# Patient Record
Sex: MALE | Race: BLACK OR AFRICAN AMERICAN | Employment: OTHER | URBAN - METROPOLITAN AREA
[De-identification: names, ages, dates, MRNs, and addresses within clinical notes are randomized per-mention and may not be internally consistent; named-entity substitution may affect disease eponyms.]

---

## 2017-03-21 ENCOUNTER — CARE COORDINATION (OUTPATIENT)
Dept: GERIATRIC MEDICINE | Facility: CLINIC | Age: 72
End: 2017-03-21

## 2017-03-21 DIAGNOSIS — Z71.89 ACP (ADVANCE CARE PLANNING): Chronic | ICD-10-CM

## 2017-03-21 NOTE — PROGRESS NOTES
Home visit/Stanislaw Risk Assessment/EW screening completed on: 3/21/17  Member resides: Apartment handicap accessible. Continues to live alone in a one bedroom apartment.  Member remains independent with all ADLs and active in the community. Has supportive family.  No recent hospital stays or ED visits.   Member currently receiving the following services:  Homemaking, on ECS.   See EMR for a list of client's diagnoses and medications.   Medication management: Medications reviewed:Yes. Medication management: Independent-does not set up. Medication understanding:Patient has understanding of regimen and is adherent Yes. MTM offered.  Member Mood/behavior-PHQ9 score: 0. Denies any changes in mood. Remained in a pleasant mood during assessment.   Plan of Care: No services recommended or requested.  Follow-Up Plan: Member informed of future contact, plan to f/u with member with a 6 month telephone assessment.  Contact information shared with member and family, encouraged member to call with any questions or concerns prior to this.  See [facility] for further detailed information  Laura Romo RN BSN ANGEL  [facility]   [phone]  Fax: [phone]

## 2017-04-11 NOTE — ASSESSMENT & PLAN NOTE
Advance Care Planning 03/21/2017: ACP Review of Chart / Resources Provided:  Reviewed chart for advance care plan.  Chivo Echavarriaf Adonay has been provided information and resources to begin or update their advance care plan.  Added by Laura Romo

## 2017-04-12 ENCOUNTER — CARE COORDINATION (OUTPATIENT)
Dept: GERIATRIC MEDICINE | Facility: CLINIC | Age: 72
End: 2017-04-12

## 2017-04-12 NOTE — PROGRESS NOTES
Mailed copy of care plan to client.  As requested/needed:  emailed CPS to [place] for auths, updated services in access as needed and submitted appropriate referrals/auths, and entered MMIS. Chart was returned to CM.   ESC tool uploaded into [employer] and services entered into Access.    Binta Thakkar  Case Management Specialist  [facility]  [phone]

## 2017-04-24 NOTE — PROGRESS NOTES
Per CM, [facility]  Binta Thakkar  Case Management Specialist  [facility]  [phone]

## 2017-04-25 NOTE — PROGRESS NOTES
Per CM, [facility]  Binta Thakkar  Case Management Specialist  [facility]  [phone]

## 2017-05-31 NOTE — PROGRESS NOTES
Per CM, [facility]  Binta Thakkar  Case Management Specialist  [facility]  [phone]

## 2017-06-01 NOTE — PROGRESS NOTES
Per CM, [facility]  Binta Thakkar  Case Management Specialist  [facility]  [phone]

## 2017-06-08 ENCOUNTER — OFFICE VISIT (OUTPATIENT)
Dept: FAMILY MEDICINE | Facility: CLINIC | Age: 72
End: 2017-06-08
Payer: COMMERCIAL

## 2017-06-08 ENCOUNTER — RADIANT APPOINTMENT (OUTPATIENT)
Dept: GENERAL RADIOLOGY | Facility: CLINIC | Age: 72
End: 2017-06-08
Attending: FAMILY MEDICINE
Payer: COMMERCIAL

## 2017-06-08 ENCOUNTER — TELEPHONE (OUTPATIENT)
Dept: FAMILY MEDICINE | Facility: CLINIC | Age: 72
End: 2017-06-08

## 2017-06-08 VITALS
RESPIRATION RATE: 16 BRPM | DIASTOLIC BLOOD PRESSURE: 76 MMHG | SYSTOLIC BLOOD PRESSURE: 120 MMHG | HEIGHT: 74 IN | OXYGEN SATURATION: 94 % | HEART RATE: 77 BPM | BODY MASS INDEX: 24.13 KG/M2 | WEIGHT: 188 LBS | TEMPERATURE: 97.3 F

## 2017-06-08 DIAGNOSIS — E11.22 TYPE 2 DIABETES MELLITUS WITH STAGE 1 CHRONIC KIDNEY DISEASE, WITHOUT LONG-TERM CURRENT USE OF INSULIN (H): Chronic | ICD-10-CM

## 2017-06-08 DIAGNOSIS — M70.51 KNEE BURSITIS, RIGHT: ICD-10-CM

## 2017-06-08 DIAGNOSIS — Z01.818 PREOP GENERAL PHYSICAL EXAM: ICD-10-CM

## 2017-06-08 DIAGNOSIS — Z13.89 SCREENING FOR DIABETIC PERIPHERAL NEUROPATHY: ICD-10-CM

## 2017-06-08 DIAGNOSIS — H10.10 SEASONAL ALLERGIC CONJUNCTIVITIS: ICD-10-CM

## 2017-06-08 DIAGNOSIS — E11.9 CONTROLLED TYPE 2 DIABETES MELLITUS WITHOUT COMPLICATION, WITHOUT LONG-TERM CURRENT USE OF INSULIN (H): Chronic | ICD-10-CM

## 2017-06-08 DIAGNOSIS — M50.30 DDD (DEGENERATIVE DISC DISEASE), CERVICAL: ICD-10-CM

## 2017-06-08 DIAGNOSIS — J30.1 SEASONAL ALLERGIC RHINITIS DUE TO POLLEN: ICD-10-CM

## 2017-06-08 DIAGNOSIS — M79.672 FOOT PAIN, LEFT: ICD-10-CM

## 2017-06-08 DIAGNOSIS — Z12.11 COLON CANCER SCREENING: ICD-10-CM

## 2017-06-08 DIAGNOSIS — M17.11 PRIMARY OSTEOARTHRITIS OF RIGHT KNEE: Primary | ICD-10-CM

## 2017-06-08 DIAGNOSIS — R20.9 DISTURBANCE OF SKIN SENSATION: ICD-10-CM

## 2017-06-08 DIAGNOSIS — E08.42 DIABETIC POLYNEUROPATHY ASSOCIATED WITH DIABETES MELLITUS DUE TO UNDERLYING CONDITION (H): ICD-10-CM

## 2017-06-08 DIAGNOSIS — E78.00 PURE HYPERCHOLESTEROLEMIA: ICD-10-CM

## 2017-06-08 DIAGNOSIS — L28.0 NEURODERMATITIS: ICD-10-CM

## 2017-06-08 DIAGNOSIS — E11.9 TYPE 2 DIABETES MELLITUS WITHOUT COMPLICATION, WITHOUT LONG-TERM CURRENT USE OF INSULIN (H): ICD-10-CM

## 2017-06-08 DIAGNOSIS — R25.2 CRAMP OF BOTH LOWER EXTREMITIES: ICD-10-CM

## 2017-06-08 DIAGNOSIS — I10 HYPERTENSION GOAL BP (BLOOD PRESSURE) < 130/80: Chronic | ICD-10-CM

## 2017-06-08 DIAGNOSIS — K21.9 GASTROESOPHAGEAL REFLUX DISEASE WITHOUT ESOPHAGITIS: ICD-10-CM

## 2017-06-08 DIAGNOSIS — E78.5 HYPERLIPIDEMIA WITH TARGET LDL LESS THAN 100: Chronic | ICD-10-CM

## 2017-06-08 DIAGNOSIS — I10 ESSENTIAL HYPERTENSION WITH GOAL BLOOD PRESSURE LESS THAN 130/80: Chronic | ICD-10-CM

## 2017-06-08 DIAGNOSIS — N40.0 BENIGN NON-NODULAR PROSTATIC HYPERPLASIA WITHOUT LOWER URINARY TRACT SYMPTOMS: ICD-10-CM

## 2017-06-08 DIAGNOSIS — N18.1 TYPE 2 DIABETES MELLITUS WITH STAGE 1 CHRONIC KIDNEY DISEASE, WITHOUT LONG-TERM CURRENT USE OF INSULIN (H): Chronic | ICD-10-CM

## 2017-06-08 LAB
ALT SERPL W P-5'-P-CCNC: 23 U/L (ref 0–70)
ANION GAP SERPL CALCULATED.3IONS-SCNC: 12 MMOL/L (ref 3–14)
BASOPHILS # BLD AUTO: 0 10E9/L (ref 0–0.2)
BASOPHILS NFR BLD AUTO: 0.2 %
BUN SERPL-MCNC: 14 MG/DL (ref 7–30)
CALCIUM SERPL-MCNC: 8.9 MG/DL (ref 8.5–10.1)
CHLORIDE SERPL-SCNC: 106 MMOL/L (ref 94–109)
CO2 SERPL-SCNC: 21 MMOL/L (ref 20–32)
CREAT SERPL-MCNC: 0.88 MG/DL (ref 0.66–1.25)
CREAT UR-MCNC: 186 MG/DL
DIFFERENTIAL METHOD BLD: NORMAL
EOSINOPHIL # BLD AUTO: 0.3 10E9/L (ref 0–0.7)
EOSINOPHIL NFR BLD AUTO: 5.4 %
ERYTHROCYTE [DISTWIDTH] IN BLOOD BY AUTOMATED COUNT: 13 % (ref 10–15)
GFR SERPL CREATININE-BSD FRML MDRD: 85 ML/MIN/1.7M2
GLUCOSE SERPL-MCNC: 170 MG/DL (ref 70–99)
HBA1C MFR BLD: 7.6 % (ref 4.3–6)
HCT VFR BLD AUTO: 45 % (ref 40–53)
HGB BLD-MCNC: 15.6 G/DL (ref 13.3–17.7)
LYMPHOCYTES # BLD AUTO: 1.3 10E9/L (ref 0.8–5.3)
LYMPHOCYTES NFR BLD AUTO: 26.1 %
MCH RBC QN AUTO: 30.6 PG (ref 26.5–33)
MCHC RBC AUTO-ENTMCNC: 34.7 G/DL (ref 31.5–36.5)
MCV RBC AUTO: 88 FL (ref 78–100)
MICROALBUMIN UR-MCNC: 7 MG/L
MICROALBUMIN/CREAT UR: 3.73 MG/G CR (ref 0–17)
MONOCYTES # BLD AUTO: 0.5 10E9/L (ref 0–1.3)
MONOCYTES NFR BLD AUTO: 11.1 %
NEUTROPHILS # BLD AUTO: 2.7 10E9/L (ref 1.6–8.3)
NEUTROPHILS NFR BLD AUTO: 57.2 %
PLATELET # BLD AUTO: 193 10E9/L (ref 150–450)
POTASSIUM SERPL-SCNC: 4 MMOL/L (ref 3.4–5.3)
RBC # BLD AUTO: 5.09 10E12/L (ref 4.4–5.9)
SODIUM SERPL-SCNC: 139 MMOL/L (ref 133–144)
WBC # BLD AUTO: 4.8 10E9/L (ref 4–11)

## 2017-06-08 PROCEDURE — 82043 UR ALBUMIN QUANTITATIVE: CPT | Performed by: FAMILY MEDICINE

## 2017-06-08 PROCEDURE — 80048 BASIC METABOLIC PNL TOTAL CA: CPT | Performed by: FAMILY MEDICINE

## 2017-06-08 PROCEDURE — 93000 ELECTROCARDIOGRAM COMPLETE: CPT | Performed by: FAMILY MEDICINE

## 2017-06-08 PROCEDURE — 84460 ALANINE AMINO (ALT) (SGPT): CPT | Performed by: FAMILY MEDICINE

## 2017-06-08 PROCEDURE — 83036 HEMOGLOBIN GLYCOSYLATED A1C: CPT | Performed by: FAMILY MEDICINE

## 2017-06-08 PROCEDURE — 36415 COLL VENOUS BLD VENIPUNCTURE: CPT | Performed by: FAMILY MEDICINE

## 2017-06-08 PROCEDURE — 85025 COMPLETE CBC W/AUTO DIFF WBC: CPT | Performed by: FAMILY MEDICINE

## 2017-06-08 PROCEDURE — 71020 XR CHEST 2 VW: CPT

## 2017-06-08 RX ORDER — TRIAMCINOLONE ACETONIDE 1 MG/G
CREAM TOPICAL
Qty: 80 G | Refills: 2 | Status: SHIPPED | OUTPATIENT
Start: 2017-06-08 | End: 2017-09-29

## 2017-06-08 RX ORDER — ASPIRIN 81 MG/1
81 TABLET, CHEWABLE ORAL DAILY
Qty: 108 TABLET | Refills: 3 | Status: SHIPPED | OUTPATIENT
Start: 2017-06-08 | End: 2017-06-08

## 2017-06-08 RX ORDER — LISINOPRIL 2.5 MG/1
2.5 TABLET ORAL DAILY
Qty: 30 TABLET | Refills: 11 | Status: SHIPPED | OUTPATIENT
Start: 2017-06-08 | End: 2017-06-08

## 2017-06-08 RX ORDER — TAMSULOSIN HYDROCHLORIDE 0.4 MG/1
0.4 CAPSULE ORAL DAILY
Qty: 90 CAPSULE | Refills: 1 | Status: SHIPPED | OUTPATIENT
Start: 2017-06-08 | End: 2017-09-29

## 2017-06-08 RX ORDER — PREDNISONE 20 MG/1
20 TABLET ORAL 2 TIMES DAILY
Qty: 14 TABLET | Refills: 0 | Status: SHIPPED | OUTPATIENT
Start: 2017-06-08 | End: 2017-06-15

## 2017-06-08 RX ORDER — FLUTICASONE PROPIONATE 50 MCG
1-2 SPRAY, SUSPENSION (ML) NASAL DAILY
Qty: 16 G | Refills: 11 | Status: SHIPPED | OUTPATIENT
Start: 2017-06-08 | End: 2017-06-08

## 2017-06-08 RX ORDER — ASPIRIN 81 MG/1
81 TABLET, CHEWABLE ORAL DAILY
Qty: 108 TABLET | Refills: 3 | Status: SHIPPED | OUTPATIENT
Start: 2017-06-08 | End: 2018-07-23

## 2017-06-08 RX ORDER — LISINOPRIL 2.5 MG/1
2.5 TABLET ORAL DAILY
Qty: 30 TABLET | Refills: 11 | Status: SHIPPED | OUTPATIENT
Start: 2017-06-08 | End: 2018-07-23

## 2017-06-08 RX ORDER — GABAPENTIN 100 MG/1
100 CAPSULE ORAL
Qty: 30 CAPSULE | Refills: 11 | Status: SHIPPED | OUTPATIENT
Start: 2017-06-08 | End: 2017-09-29

## 2017-06-08 RX ORDER — LANOLIN ALCOHOL/MO/W.PET/CERES
1000 CREAM (GRAM) TOPICAL DAILY
Qty: 100 TABLET | Refills: 3 | Status: SHIPPED | OUTPATIENT
Start: 2017-06-08 | End: 2017-09-29

## 2017-06-08 RX ORDER — TAMSULOSIN HYDROCHLORIDE 0.4 MG/1
0.4 CAPSULE ORAL DAILY
Qty: 90 CAPSULE | Refills: 1 | Status: SHIPPED | OUTPATIENT
Start: 2017-06-08 | End: 2017-06-08

## 2017-06-08 RX ORDER — METFORMIN HCL 500 MG
TABLET, EXTENDED RELEASE 24 HR ORAL
Qty: 60 TABLET | Refills: 11 | Status: SHIPPED | OUTPATIENT
Start: 2017-06-08 | End: 2017-06-08

## 2017-06-08 RX ORDER — FINASTERIDE 5 MG/1
5 TABLET, FILM COATED ORAL DAILY
Qty: 30 TABLET | Refills: 1 | Status: SHIPPED | OUTPATIENT
Start: 2017-06-08 | End: 2017-09-29

## 2017-06-08 RX ORDER — LIDOCAINE 50 MG/G
OINTMENT TOPICAL
Qty: 142 G | Refills: 11 | Status: SHIPPED | OUTPATIENT
Start: 2017-06-08 | End: 2017-06-08

## 2017-06-08 RX ORDER — LIDOCAINE 50 MG/G
OINTMENT TOPICAL
Qty: 142 G | Refills: 11 | Status: SHIPPED | OUTPATIENT
Start: 2017-06-08 | End: 2017-09-29

## 2017-06-08 RX ORDER — ATORVASTATIN CALCIUM 10 MG/1
10 TABLET, FILM COATED ORAL DAILY
Qty: 30 TABLET | Refills: 11 | Status: SHIPPED | OUTPATIENT
Start: 2017-06-08 | End: 2017-06-08

## 2017-06-08 RX ORDER — ACETAMINOPHEN 500 MG
500-1000 TABLET ORAL EVERY 6 HOURS PRN
Qty: 100 TABLET | Refills: 11 | Status: SHIPPED | OUTPATIENT
Start: 2017-06-08 | End: 2017-12-19

## 2017-06-08 RX ORDER — LEVOCETIRIZINE DIHYDROCHLORIDE 5 MG/1
5 TABLET, FILM COATED ORAL EVERY EVENING
Qty: 30 TABLET | Refills: 11 | Status: SHIPPED | OUTPATIENT
Start: 2017-06-08 | End: 2018-07-23

## 2017-06-08 RX ORDER — FINASTERIDE 5 MG/1
5 TABLET, FILM COATED ORAL DAILY
Qty: 30 TABLET | Refills: 1 | Status: SHIPPED | OUTPATIENT
Start: 2017-06-08 | End: 2017-06-08

## 2017-06-08 RX ORDER — ACETAMINOPHEN 500 MG
500-1000 TABLET ORAL EVERY 6 HOURS PRN
Qty: 100 TABLET | Refills: 11 | Status: SHIPPED | OUTPATIENT
Start: 2017-06-08 | End: 2017-06-08

## 2017-06-08 RX ORDER — LANOLIN ALCOHOL/MO/W.PET/CERES
1000 CREAM (GRAM) TOPICAL DAILY
Qty: 100 TABLET | Refills: 3 | Status: SHIPPED | OUTPATIENT
Start: 2017-06-08 | End: 2017-06-08

## 2017-06-08 RX ORDER — TRIAMCINOLONE ACETONIDE 1 MG/G
CREAM TOPICAL
Qty: 80 G | Refills: 2 | Status: SHIPPED | OUTPATIENT
Start: 2017-06-08 | End: 2017-06-08

## 2017-06-08 RX ORDER — FLUTICASONE PROPIONATE 50 MCG
1-2 SPRAY, SUSPENSION (ML) NASAL DAILY
Qty: 16 G | Refills: 11 | Status: SHIPPED | OUTPATIENT
Start: 2017-06-08 | End: 2018-07-23

## 2017-06-08 RX ORDER — LEVOCETIRIZINE DIHYDROCHLORIDE 5 MG/1
5 TABLET, FILM COATED ORAL EVERY EVENING
Qty: 30 TABLET | Refills: 11 | Status: SHIPPED | OUTPATIENT
Start: 2017-06-08 | End: 2017-06-08

## 2017-06-08 RX ORDER — METFORMIN HCL 500 MG
TABLET, EXTENDED RELEASE 24 HR ORAL
Qty: 60 TABLET | Refills: 11 | Status: SHIPPED | OUTPATIENT
Start: 2017-06-08 | End: 2018-07-23

## 2017-06-08 RX ORDER — PREDNISONE 20 MG/1
20 TABLET ORAL 2 TIMES DAILY
Qty: 14 TABLET | Refills: 0 | Status: SHIPPED | OUTPATIENT
Start: 2017-06-08 | End: 2017-06-08

## 2017-06-08 RX ORDER — ATORVASTATIN CALCIUM 10 MG/1
10 TABLET, FILM COATED ORAL DAILY
Qty: 30 TABLET | Refills: 11 | Status: SHIPPED | OUTPATIENT
Start: 2017-06-08 | End: 2018-07-23

## 2017-06-08 NOTE — NURSING NOTE
"Chief Complaint   Patient presents with     Knee Pain       Initial /76  Pulse 77  Temp 97.3  F (36.3  C) (Tympanic)  Resp 16  Ht 6' 1.5\" (1.867 m)  Wt 188 lb (85.3 kg)  SpO2 94%  BMI 24.47 kg/m2 Estimated body mass index is 24.47 kg/(m^2) as calculated from the following:    Height as of this encounter: 6' 1.5\" (1.867 m).    Weight as of this encounter: 188 lb (85.3 kg).  Medication Reconciliation: complete   .Macario CAZARES      "

## 2017-06-08 NOTE — TELEPHONE ENCOUNTER
Patient is at pharmacy and pharmacy says they did not receive any prescriptions from us today, specifically the prednisone.     Called in prednisone verbally.     Erika Alcala RN  06/08/17  2:53 PM

## 2017-06-08 NOTE — LETTER
[facility]  [address]  [address]  [place] [address] [phone]                                                                                                           Chivo Vitor Adonay  [address]  [address]  [place] [address]    June 8, 2017      Dear Chivo,    The results of your recent tests were reviewed and are enclosed.     NORMAL CHEST XRAY   CLEARED FOR PREOP  Results for orders placed or performed in visit on 06/08/17   XR Chest 2 Views    Narrative    XR CHEST 2 VW 6/8/2017 8:42 AM    COMPARISON: 1/19/2016    HISTORY: Preoperative evaluation.      Impression    IMPRESSION: Cardiac silhouette and pulmonary vasculature are within  normal limits. No focal airspace disease, pleural effusion or  pneumothorax.    RUPA FERGUSON           Thank you for choosing [facility].  We appreciate the opportunity to serve you and look forward to supporting your healthcare needs in the future.    If you have any questions or concerns, please call me or my staff at [phone].      Sincerely,    Darrin Hernandez Jr MD

## 2017-06-08 NOTE — PROGRESS NOTES
Please send normal lab letter when labs are complete  NORMAL CHEST XRAY   CLEARED FOR PREOP   EDUARDA LAMBERT JR., MD

## 2017-06-08 NOTE — MR AVS SNAPSHOT
After Visit Summary   6/8/2017    Chivo Urias    MRN: [id number]           Patient Information     Date Of Birth          1945        Visit Information        Provider Department      6/8/2017 7:30 AM Darrin Hernandez MD; [employer] [facility]        Today's Diagnoses     Primary osteoarthritis of right knee    -  1    Controlled type 2 diabetes mellitus without complication, without long-term current use of insulin (H)        Type 2 diabetes mellitus with stage 1 chronic kidney disease, without long-term current use of insulin (H)        Hyperlipidemia with target LDL less than 100        Hypertension goal BP (blood pressure) < 130/80        Colon cancer screening        Preop general physical exam          Care Instructions    (M17.11) Primary osteoarthritis of right knee  (primary encounter diagnosis)  Comment:    Plan: Albumin Random Urine Quantitative, Hemoglobin         A1c, predniSONE (DELTASONE) 20 MG tablet             (E11.9) Controlled type 2 diabetes mellitus without complication, without long-term current use of insulin (H)  Comment:    Plan:      (E11.22,  N18.1) Type 2 diabetes mellitus with stage 1 chronic kidney disease, without long-term current use of insulin (H)  Comment:    Plan:      (E78.5) Hyperlipidemia with target LDL less than 100  Comment:    Plan: ALT             (I10) Hypertension goal BP (blood pressure) < 130/80  Comment:    Plan: Basic metabolic panel             (Z12.11) Colon cancer screening  Comment:    Plan: Fecal colorectal cancer screen (FIT)             (Z01.818) Preop general physical exam  Comment:    Plan: EKG 12-lead complete w/read - Clinics, XR Chest        2 Views, CBC with platelets differential                          Follow-ups after your visit        Follow-up notes from your care team     Return in about 2 weeks (around 6/22/2017).      Future tests that were ordered for you today  "    Open Future Orders        Priority Expected Expires Ordered    XR Chest 2 Views Routine 2017    Fecal colorectal cancer screen (FIT) Routine 2017            Who to contact     If you have questions or need follow up information about today's clinic visit or your schedule please contact [facility] directly at [phone].  Normal or non-critical lab and imaging results will be communicated to you by [employer], letter or phone within 4 business days after the clinic has received the results. If you do not hear from us within 7 days, please contact the clinic through [employer] or phone. If you have a critical or abnormal lab result, we will notify you by phone as soon as possible.  Submit refill requests through [employer] or call your pharmacy and they will forward the refill request to us. Please allow 3 business days for your refill to be completed.          Additional Information About Your Visit        Pic[employer]f Information     [employer] lets you send messages to your doctor, view your test results, renew your prescriptions, schedule appointments and more. To sign up, go to [email or web address] . Click on \"Log in\" on the left side of the screen, which will take you to the Welcome page. Then click on \"Sign up Now\" on the right side of the page.     You will be asked to enter the access code listed below, as well as some personal information. Please follow the directions to create your username and password.     Your access code is: [email or web address]  Expires: 2017  8:05 AM     Your access code will  in 90 days. If you need help or a new code, please call your [facility] clinic or [phone].        Care EveryWhere ID     This is your Care EveryWhere ID. This could be used by other organizations to access your [facility] medical records  [id number]        Your Vitals Were     Pulse Temperature Respirations Height Pulse Oximetry BMI " "(Body Mass Index)    77 97.3  F (36.3  C) (Tympanic) 16 6' 1.5\" (1.867 m) 94% 24.47 kg/m2       Blood Pressure from Last 3 Encounters:   06/08/17 120/76   12/08/16 124/64   11/10/16 112/64    Weight from Last 3 Encounters:   06/08/17 188 lb (85.3 kg)   12/08/16 196 lb (88.9 kg)   11/10/16 193 lb (87.5 kg)              We Performed the Following     Albumin Random Urine Quantitative     ALT     Basic metabolic panel     CBC with platelets differential     EKG 12-lead complete w/read - Clinics     Hemoglobin A1c          Today's Medication Changes          These changes are accurate as of: 6/8/17  8:05 AM.  If you have any questions, ask your nurse or doctor.               Start taking these medicines.        Dose/Directions    predniSONE 20 MG tablet   Commonly known as:  DELTASONE   Used for:  Primary osteoarthritis of right knee   Started by:  Darrin Henrandez MD        Dose:  20 mg   Take 1 tablet (20 mg) by mouth 2 times daily   Quantity:  14 tablet   Refills:  0            Where to get your medicines      Call your pharmacy to confirm that your medication is ready for pickup. It may take up to 24 hours for them to receive the prescription. If the prescription is not ready within 3 business days, please contact your clinic or your provider.     We will let you know when these medications are ready. If you don't hear back within 3 business days, please contact us.     predniSONE 20 MG tablet                Primary Care Provider Office Phone # Fax #    Darrin Hernandez [phone]       [facility] JOSIE ABEBE [address] [facility] [address]        Thank you!     Thank you for choosing [facility]  for your care. Our goal is always to provide you with excellent care. Hearing back from our patients is one way we can continue to improve our services. Please take a few minutes to complete the written survey that you may receive in the mail " after your visit with us. Thank you!             Your Updated Medication List - Protect others around you: Learn how to safely use, store and throw away your medicines at www.disposemymeds.org.          This list is accurate as of: 6/8/17  8:05 AM.  Always use your most recent med list.                   Brand Name Dispense Instructions for use    acetaminophen 500 MG tablet    TYLENOL    100 tablet    Take 1-2 tablets (500-1,000 mg) by mouth every 6 hours as needed       aspirin 81 MG chewable tablet     108 tablet    Take 1 tablet (81 mg) by mouth daily       atorvastatin 10 MG tablet    LIPITOR    30 tablet    Take 1 tablet (10 mg) by mouth daily       blood glucose monitoring lancets     1 Box    1 each 2 times daily       blood glucose monitoring meter device kit     1 kit    Use to test blood sugars TWICE DAILY  times daily or as directed.       blood glucose monitoring test strip    MIKHAIL CONTOUR NEXT    100 each    1 strip by In Vitro route daily       cholecalciferol 1000 UNITS Tabs    VITAMIN D-1000 MAX ST    90 tablet    Take 3 tablets by mouth daily       cyanocobalamin 1000 MCG tablet    vitamin  B-12    100 tablet    Take 1 tablet (1,000 mcg) by mouth daily       finasteride 5 MG tablet    PROSCAR    30 tablet    Take 1 tablet (5 mg) by mouth daily       fluticasone 50 MCG/ACT spray    FLONASE    16 g    Spray 1-2 sprays into both nostrils daily       gabapentin 100 MG capsule    NEURONTIN    30 capsule    Take 1 capsule (100 mg) by mouth every evening as needed       ketotifen 0.025 % Soln ophthalmic solution    ZADITOR    1 Bottle    Place 1 drop into both eyes 2 times daily       levocetirizine 5 MG tablet    XYZAL    30 tablet    Take 1 tablet (5 mg) by mouth every evening       lidocaine 5 % ointment    XYLOCAINE    142 g    One application 4 x daily to affected areas lower back and knees if necessary       lisinopril 2.5 MG tablet    PRINIVIL/Zestril    30 tablet    Take 1 tablet (2.5 mg) by  mouth daily       metFORMIN 500 MG 24 hr tablet    GLUCOPHAGE-XR    60 tablet    TAKE 1 TABLET BY MOUTH TWICE DAILY WITH MEALS       MICROLIFE WRIST BP MONITOR Monet     1 Device    1 Device 2 times daily       predniSONE 20 MG tablet    DELTASONE    14 tablet    Take 1 tablet (20 mg) by mouth 2 times daily       ranitidine 150 MG tablet    ZANTAC    60 tablet    Take 1 tablet (150 mg) by mouth 2 times daily       tamsulosin 0.4 MG capsule    FLOMAX    90 capsule    Take 1 capsule (0.4 mg) by mouth daily       triamcinolone 0.1 % cream    KENALOG    80 g    Apply sparingly to affected area three times daily as needed       Trolamine Salicylate 10 % Lotn    ASPERCREME    120 mL    Externally apply topically 4 times daily as needed

## 2017-06-08 NOTE — TELEPHONE ENCOUNTER
Called pharmacy with [facility] pharmacy with verbal approval for gabapentin.

## 2017-06-08 NOTE — PROGRESS NOTES
NORMAL THREE MONTH GLUCOSE AVERAGE   A1C 5.0 AVERAGE GLUCOSE   90  A1C 6.0 AVERAGE GLUCOSE 120  A1C 6.5 AVERAGE GLUCOSE 135  A1C 6.8 AVERAGE GLUCOSE 144  A1C 7.0 AVERAGE GLUCOSE 150  A1C 8.0 AVERAGE GLUCOSE 180  NORMAL COMPLETE BLOOD PANEL WBCS RBCS AND PLATELETS   EDUARDA LAMBERT JR., MD

## 2017-06-08 NOTE — LETTER
[address]  [address]  [facility] [address] [phone]                                                                                                           Chivo Urias  [address]  [address]  [place] [address]    June 9, 2017      Dear Chivo,    The results of your recent tests were reviewed and are enclosed.     NORMAL DIABETES URINE PROTEIN TEST   NORMAL GLUCOSE, RENAL AND BLOOD SALTS  EXCEPT HIGH FASTING BLOOD SUGAR 170MG%   NORMAL LIVER FUNCTION TEST   NORMAL THREE MONTH GLUCOSE AVERAGE   A1C 5.0 AVERAGE GLUCOSE   90   A1C 6.0 AVERAGE GLUCOSE 120   A1C 6.5 AVERAGE GLUCOSE 135   A1C 6.8 AVERAGE GLUCOSE 144   A1C 7.0 AVERAGE GLUCOSE 150   A1C 8.0 AVERAGE GLUCOSE 180   NORMAL COMPLETE BLOOD PANEL WBCS RBCS AND PLATELETS   Results for orders placed or performed in visit on 06/08/17   CBC with platelets differential   Result Value Ref Range    WBC 4.8 4.0 - 11.0 10e9/L    RBC Count 5.09 4.4 - 5.9 10e12/L    Hemoglobin 15.6 13.3 - 17.7 g/dL    Hematocrit 45.0 40.0 - 53.0 %    MCV 88 78 - 100 fl    MCH 30.6 26.5 - 33.0 pg    MCHC 34.7 31.5 - 36.5 g/dL    RDW 13.0 10.0 - 15.0 %    Platelet Count 193 150 - 450 10e9/L    Diff Method Automated Method     % Neutrophils 57.2 %    % Lymphocytes 26.1 %    % Monocytes 11.1 %    % Eosinophils 5.4 %    % Basophils 0.2 %    Absolute Neutrophil 2.7 1.6 - 8.3 10e9/L    Absolute Lymphocytes 1.3 0.8 - 5.3 10e9/L    Absolute Monocytes 0.5 0.0 - 1.3 10e9/L    Absolute Eosinophils 0.3 0.0 - 0.7 10e9/L    Absolute Basophils 0.0 0.0 - 0.2 10e9/L   Basic metabolic panel   Result Value Ref Range    Sodium 139 133 - 144 mmol/L    Potassium 4.0 3.4 - 5.3 mmol/L    Chloride 106 94 - 109 mmol/L    Carbon Dioxide 21 20 - 32 mmol/L    Anion Gap 12 3 - 14 mmol/L    Glucose 170 (H) 70 - 99 mg/dL    Urea Nitrogen 14 7 - 30 mg/dL    Creatinine 0.88 0.66 - 1.25 mg/dL    GFR Estimate 85 >60  mL/min/1.7m2    GFR Estimate If Black >90   GFR Calc   >60 mL/min/1.7m2    Calcium 8.9 8.5 - 10.1 mg/dL   Albumin Random Urine Quantitative   Result Value Ref Range    Creatinine Urine 186 mg/dL    Albumin Urine mg/L 7 mg/L    Albumin Urine mg/g Cr 3.73 0 - 17 mg/g Cr   Hemoglobin A1c   Result Value Ref Range    Hemoglobin A1C 7.6 (H) 4.3 - 6.0 %   ALT   Result Value Ref Range    ALT 23 0 - 70 U/L           Thank you for choosing [facility].  We appreciate the opportunity to serve you and look forward to supporting your healthcare needs in the future.    If you have any questions or concerns, please call me or my staff at [phone].      Sincerely,    Darrin Hernandez Jr MD

## 2017-06-08 NOTE — PROGRESS NOTES
SUBJECTIVE:                                                    Chivo Urias is a 71 year old male who presents to clinic today for the following health issues:      Musculoskeletal problem/pain      Duration:  Right knee pain     Description  Location: right knee    Intensity:  moderate, severe    Accompanying signs and symptoms: pain in front of knee and back    History  Previous similar problem: YES  Previous evaluation:  x-ray and MRI    Precipitating or alleviating factors:  Trauma or overuse: YES  Aggravating factors include: sitting, standing and walking    Therapies tried and outcome: nothing   SEASONAL ALLERGIC RHINITIS CONTROLLED  MUSCLES TENDER RIGHT HAMSTRING AND CALF   LOWER BACK PAIN with RIGHT LEG IMPROVED   GLAUCOMA DROPS EYE MD REGULARLY   LEFT FOOT PAIN IMPROVED  DIABETES 2 GOAL 8% GOOD CONTROL AT PRESENT  CHOLESTEROL MEDICATIONS AND DIET   SIDE EFFECTS BENEFITS AND RISKS DISCUSSED    TREATMENT PROGNOSIS BENEFITS AND RISKS DISCUSSED   MEDICATION RISKS SIDE EFFECTS BENEFITS AND RISKS DISCUSSED   MILD RENAL ISSURES   HYPERTENSION WITH GOAL OF LESS THAN 140/80 GOOD CONTROL DIET AND MES  BENIGN PROSTATIC HYPERTROPHY SYMPTOMS IMPROVE    .  Current Outpatient Prescriptions   Medication Sig Dispense Refill     predniSONE (DELTASONE) 20 MG tablet Take 1 tablet (20 mg) by mouth 2 times daily 14 tablet 0     Trolamine Salicylate (ASPERCREME) 10 % LOTN Externally apply topically 4 times daily as needed 120 mL 11     tamsulosin (FLOMAX) 0.4 MG 24 hr capsule Take 1 capsule (0.4 mg) by mouth daily 90 capsule 1     finasteride (PROSCAR) 5 MG tablet Take 1 tablet (5 mg) by mouth daily 30 tablet 1     lidocaine (XYLOCAINE) 5 % ointment One application 4 x daily to affected areas lower back and knees if necessary 142 g 11     ketotifen (ZADITOR) 0.025 % SOLN Place 1 drop into both eyes 2 times daily 1 Bottle 11     blood glucose monitoring (MIKHAIL CONTOUR NEXT) test strip 1 strip by In Vitro route daily 100  each 11     atorvastatin (LIPITOR) 10 MG tablet Take 1 tablet (10 mg) by mouth daily 30 tablet 11     acetaminophen (TYLENOL) 500 MG tablet Take 1-2 tablets (500-1,000 mg) by mouth every 6 hours as needed 100 tablet 11     levocetirizine (XYZAL) 5 MG tablet Take 1 tablet (5 mg) by mouth every evening 30 tablet 11     fluticasone (FLONASE) 50 MCG/ACT nasal spray Spray 1-2 sprays into both nostrils daily 16 g 11     triamcinolone (KENALOG) 0.1 % cream Apply sparingly to affected area three times daily as needed 80 g 2     gabapentin (NEURONTIN) 100 MG capsule Take 1 capsule (100 mg) by mouth every evening as needed 30 capsule 11     cholecalciferol (VITAMIN D-1000 MAX ST) 1000 UNITS TABS Take 3 tablets by mouth daily 90 tablet 11     cyanocobalamin (VITAMIN  B-12) 1000 MCG tablet Take 1 tablet (1,000 mcg) by mouth daily 100 tablet 3     metFORMIN (GLUCOPHAGE-XR) 500 MG 24 hr tablet TAKE 1 TABLET BY MOUTH TWICE DAILY WITH MEALS 60 tablet 11     blood glucose monitoring (Meitu MICROLET) lancets 1 each 2 times daily 1 Box prn     aspirin 81 MG chewable tablet Take 1 tablet (81 mg) by mouth daily 108 tablet 3     blood glucose monitoring (Meitu CONTOUR MONITOR) meter device kit Use to test blood sugars TWICE DAILY  times daily or as directed. 1 kit 0     lisinopril (PRINIVIL,ZESTRIL) 2.5 MG tablet Take 1 tablet (2.5 mg) by mouth daily 30 tablet 11     ranitidine (ZANTAC) 150 MG tablet Take 1 tablet (150 mg) by mouth 2 times daily 60 tablet 11     Blood Pressure Monitoring (MICROLIFE WRIST BP MONITOR) TATIANA 1 Device 2 times daily 1 Device 0        No Known Allergies    Immunization History   Administered Date(s) Administered     Pneumococcal 23 valent 03/05/2013     Tetanus 01/01/2010         reports that he does not drink alcohol.      reports that he does not use illicit drugs.    family history includes Family History Negative in his father and mother.    indicated that his mother is alive. He indicated that his father  is alive.      has no past surgical history on file.     reports that he does not engage in sexual activity.  .  Pediatric History   Patient Guardian Status     Not on file.     Other Topics Concern     Parent/Sibling W/ Cabg, Mi Or Angioplasty Before 65f 55m? No     Social History Narrative    Surgical History  Return To Top     Status Surgery Time Frame Comment Record Date     Inactive  NONE      11/14/2007          --------------------------------------------------------------------------------    Food Allergy  Return To Top     Allergen Reaction Comment Record Date     * No known food allergies      11/14/2007          --------------------------------------------------------------------------------    Drug Allergy  Return To Top     Allergen Reaction Comment Record Date     * No known drug allergies      5/15/2007          --------------------------------------------------------------------------------    Environment Allergy  Return To Top     Allergen Reaction Comment Record Date     * No known environmental allergies      11/14/2007          --------------------------------------------------------------------------------    Social History  Return To Top     Question Answer Comment Record Date     Marital status      11/14/2007      Advance Directive or Living Will  No    5/18/2010      Emotional Abuse  No    7/1/2011      Exercise  Yes SOMETIMES  walks alot.  11/14/2007      Caffeine  Yes  Tea  1/11/2008      Physical Abuse  No    7/1/2011      Sealtbelts  Yes    11/14/2007      Sexual Abuse  No     7/1/2011      Breast/Testicle Self Check  Yes    11/14/2007      Number of children  7  4 GIRLS AND 3 BOYS  7/17/2007      Living arrangements  Apartment/Condo    11/14/2007      Number of children in household  0    11/14/2007      Number of adults in household  1    11/14/2007      Education level  High School Graduate    11/14/2007      Employment  Currently unemployed    11/14/2007      Tobacco history   Has never smoked or chewed tobacco    8/29/2008      Alcohol history  Never drinks alcohol    11/14/2007      Has the patient ever used illegal drugs?  Has never used illegal drugs    7/1/2011      Has the patient used marijuana?  No    7/1/2011      Has the patient used cocaine?  No    7/1/2011          --------------------------------------------------------------------------------    Medical History Return To Top     Status Diagnosis Time Frame Comment Record Date     Active (729.1) - C - Myalgia      8/11/2011      Active (726.79) - C - Sub-Achilles bursitis      8/11/2011      Active (700) - C - Corn/callus    o  7/1/2011      Active (272.4) - C - Hyperlipidemia      7/1/2011      Active (V81.2) - C - SCREEN-CARDIOVASC NEC      6/21/2011      Active (V82.9) - C - Screening, vitamin d deficiency      4/8/2011      Active (V77.91) - C - Screening, lipids      4/8/2011      Active (V76.51) - C - Screening, colon      4/8/2011      Active (780.79) - C - Fatigue      5/18/2010      Active V76.44 Screening, prostate      10/7/2009      Active (780.79) - C - Fatigue      10/7/2009      Active 477.9 Rhinitis, allergic, cause unspecified      8/29/2008      Active (782.0) - C - Numbness    RIGHT LEG  6/19/2008      Active 780.79 Fatigue      2/20/2008      Active (784.0) - C - Headache, unspec.      2/1/2008      Active 724.3 Sciatica    chronic, with worsening weakness and sensory changes in S1 distribution  1/11/2008      Active 608.4 MALE GEN INFLAM DIS OT      11/20/2007      Active 608.9 MALE GENITAL DIS UNSPEC      11/14/2007      Active V78.3 Screening, HGB      11/14/2007      Active 355.9 MONONEURITIS UNSPEC      7/17/2007      Active 365.9 UNSPECIFIED GLAUCOMA      5/16/2007      Active (719.46) - C - Knee pain      5/16/2007      Active (729.5) - C - limb pain    both legs muscle aches  5/16/2007      Active (724.2) - C - Low back pain      5/16/2007      Inactive  (780.79) - C - Fatigue    IMPROVED    10/22/2009      Inactive  (780.79) - C - Fatigue    IMPROVED  6/19/2008      Inactive  782.0 Numbness      5/16/2007          ----------------------------------------------------        --------------------------------------------------------------------------------    Family History  Return To Top     Status Relationship Disease Comment Record Date     Alive Mother      11/14/2007      Alive Father      11/14/2007          --------------------------------------------------------------------------------                 reports that he has never smoked. He has never used smokeless tobacco.    Medical, social, surgical, and family histories reviewed.    Problem list, Medication list, Allergies, and Medical/Social/Surgical histories reviewed in [employer] and updated as appropriate.  Labs reviewed in EP[facility]  Patient Active Problem List   Diagnosis     Health Care Home     Myalgia and myositis     Allergic rhinitis     Sciatica     Glaucoma     Foot pain, left     Controlled type 2 diabetes mellitus without complication (H)     Hyperlipidemia with target LDL less than 100     Vitamin D insufficiency     DDD (degenerative disc disease), lumbar     Stage 1 chronic renal impairment associated with type 2 diabetes mellitus (H)     Comprehensive diabetic foot examination, type 2 DM, encounter for (H)     Low HDL (under 40)     Hypertension goal BP (blood pressure) < 130/80     CKD (chronic kidney disease) stage 2, GFR 60-89 ml/min     Right hip pain     Type 2 diabetes, HbA1C goal < 8% (H)     ACP (advance care planning)     Enthesopathy of right hip region     History reviewed. No pertinent surgical history.    Social History   Substance Use Topics     Smoking status: Never Smoker     Smokeless tobacco: Never Used     Alcohol use No     Family History   Problem Relation Age of Onset     Family History Negative Mother      Family History Negative Father          No Known Allergies  Recent Labs   Lab Test  12/08/16   0920  11/10/16    1028  09/15/16   0906  03/14/16   0931   03/04/14   1008   A1C   --   6.9*  6.7*  6.9*   < >  6.1*   LDL   --   62  69  92   < >  111   HDL   --   42  45  46   < >  44   TRIG   --   216*  131  190*   < >  137   ALT   --   32  29  39   --   28   CR   --   1.08  1.04  1.10   < >  1.10   GFRESTIMATED   --   67  70  66   < >  66   GFRESTBLACK   --   81  85  80   < >  80   POTASSIUM   --   4.2  4.2  4.1   < >  4.3   TSH  0.99   --    --    --    --   1.02    < > = values in this interval not displayed.        BP Readings from Last 6 Encounters:   06/08/17 120/76   12/08/16 124/64   11/10/16 112/64   09/22/16 108/64   09/15/16 110/66   03/14/16 110/64       Wt Readings from Last 3 Encounters:   06/08/17 188 lb (85.3 kg)   12/08/16 196 lb (88.9 kg)   11/10/16 193 lb (87.5 kg)         Positive symptoms or findings indicated by bold designation:     ROS: 10 point ROS neg other than the symptoms noted above in the HPI.except  has Health Care Home; Myalgia and myositis; Allergic rhinitis; Sciatica; Glaucoma; Foot pain, left; Controlled type 2 diabetes mellitus without complication (H); Hyperlipidemia with target LDL less than 100; Vitamin D insufficiency; DDD (degenerative disc disease), lumbar; Stage 1 chronic renal impairment associated with type 2 diabetes mellitus (H); Comprehensive diabetic foot examination, type 2 DM, encounter for (H); Low HDL (under 40); Hypertension goal BP (blood pressure) < 130/80; CKD (chronic kidney disease) stage 2, GFR 60-89 ml/min; Right hip pain; Type 2 diabetes, HbA1C goal < 8% (H); ACP (advance care planning); and Enthesopathy of right hip region on his problem list.   Constitutional: The patient denied fatigue, fever, insomnia, night sweats, recent illness and weight loss.  8 POUND WEIGHT LOSS     Eyes: The patient denied blindness, eye pain, eye tearing, photophobia, vision change and visual disturbance. NORMAL        Ears/Nose/Throat/Neck: The patient denied dizziness, facial pain,  hearing loss, nasal discharge, oral pain, otalgia, postnasal drip, sinus congestion, sore throat, tinnitus and voice change.   NORMAL HEARING AND BALANCE     Cardiovascular: The patient denied arrhythmia, chest pain/pressure, claudication, edema, exercise intolerance, fatigue, orthopnea, palpitations and syncope.  NORMAL      Respiratory: The patient denied asthma, chest congestion, cough, dyspnea on exertion, dyspnea/shortness of breath, hemoptysis, pedal edema, pleuritic pain, productive sputum, snoring and wheezing. NORMAL     Gastrointestinal: The patient denied abdominal pain, anorexia, constipation, diarrhea, dysphagia, gastroesophageal reflux, hematochezia, hemorrhoids, melena, nausea and vomiting . NORMAL     Genitourinary/Nephrology: The patient denied breast complaint, dysuria, nocturia sexual dysfunction, t, urinary frequency, urinary incontinence, urinary urgency        Musculoskeletal: The patient denied arthralgia(s), back pain, joint complaint, muscle weakness, myalgias, osteoporosis, sciatica, stiffness and swelling.      Dermatoligic:: The patient denied acne, dermatitis, ecchymosis, itching, mole change, rash, skin cancer, skin lesion and sores.      Neurologic: The patient denied dizziness, gait abnormality, headache, memory loss, mental status change, paresis, paresthesia, seizure, syncope, tremor and vision change. NORMAL       Psychiatric: The patient denied anxiety, depression, disturbances of memory, drug abuse, insomnia, mood swings and relationship difficulties.  NORMAL      Endocrine: The patient denied , goiter, obesity, polyuria and thyroid disease.  NORMAL      Hematologic/Lymphatic: The patient denied abnormal bleeding and bruising, abnormal ecchymoses, anemia, lymph node enlargement/mass, petechiae and venous  Thrombosis.  NORMAL     Allergy/Immunology: The patient denied food allergy and  Allergic rhinitis or conjunctivitis.  NORMAL       PE:  /76  Pulse 77  Temp 97.3  F  "(36.3  C) (Tympanic)  Resp 16  Ht 6' 1.5\" (1.867 m)  Wt 188 lb (85.3 kg)  SpO2 94%  BMI 24.47 kg/m2 Body mass index is 24.47 kg/(m^2).    Constitutional: general appearance, well nourished, well developed, in no acute distress, well developed, appears stated age, normal body habitus,  NORMAL     Eyes:; The patient has normal eyelids sclerae and conjunctivae : NORMAL       Ears/Nose/Throat: external ear, overall: normal appearance; external nose, overall: benign appearance, normal moujth gums and lips  The patient has:  NORMAL     Neck: thyroid, overall: normal size, normal consistency, nontender,  NORMAL      Respiratory:  palpation of chest, overall: normal excursion,  NORMAL   Clear to percussion and auscultation  NORMAL     Tachypnea  NORMAL  Color  NORMAL     Cardiovascular:  Good color with no peripheral edema  NORMAL   Regular sinus rhythm without murmur. Physiologic heart sounds Heart is unelarged  .   Chest/Breast: normal shape  NORMAL        Abdominal exam,  Liver and spleen are  unenlarged  NORMAL       Tenderness  NORMAL   Scars  NORMAL     Urogenital; no renal, flank or bladder  tenderness;  NORMAL     Lymphatic: neck nodes,  NORMAL     Other nodes NORMAL     Musculoskeletal:  Brief ortho exam normal except:   NORMAL     Integument: inspection of skin, no rash, lesions; and, palpation, no induration, no tenderness.  NORMAL      Neurologic mental status, overall: alert and oriented; gait, no ataxia, no unsteadiness; coordination, no tremors; cranial nerves, overall: normal motor, overall: normal bulk, tone.  NORMAL     Psychiatric: orientation/consciousness, overall: oriented to person, place and time; behavior/psychomotor activity, no tics, normal psychomotor activity; mood and affect, overall: normal mood and affect; appearance, overall: well-groomed, good eye contact; speech, overall: normal quality, no aphasia and normal quality, quantity, intact.  NORMAL     Diagnostic Test Results:  Results for " orders placed or performed in visit on 12/08/16   TSH WITH FREE T4 REFLEX   Result Value Ref Range    TSH 0.99 0.40 - 5.00 mU/L   Prostate spec antigen screen   Result Value Ref Range    PSA 1.37 0 - 4 ug/L         ICD-10-CM    1. Primary osteoarthritis of right knee M17.11 Albumin Random Urine Quantitative     Hemoglobin A1c     predniSONE (DELTASONE) 20 MG tablet   2. Controlled type 2 diabetes mellitus without complication, without long-term current use of insulin (H) E11.9    3. Type 2 diabetes mellitus with stage 1 chronic kidney disease, without long-term current use of insulin (H) E11.22     N18.1    4. Hyperlipidemia with target LDL less than 100 E78.5 ALT   5. Hypertension goal BP (blood pressure) < 130/80 I10 Basic metabolic panel   6. Colon cancer screening Z12.11 Fecal colorectal cancer screen (FIT)   7. Preop general physical exam Z01.818 EKG 12-lead complete w/read - Clinics     XR Chest 2 Views     CBC with platelets differential        .    Side effects benefits and risks thoroughly discussed. .he may come in early if unimproved or getting worse          Importance of adhering to regimen discussed and if medications were dispensed, the importance of taking medications discussed and bringing in the medications after every visit for chronic problems         Please drink 2 glasses of water prior to meals and walk 15-30 minutes after meals    I spent  25 MINUTES SPENT  with patient discussing the following issues  NORMAL  The primary encounter diagnosis was Primary osteoarthritis of right knee. Diagnoses of Controlled type 2 diabetes mellitus without complication, without long-term current use of insulin (H), Type 2 diabetes mellitus with stage 1 chronic kidney disease, without long-term current use of insulin (H), Hyperlipidemia with target LDL less than 100, Hypertension goal BP (blood pressure) < 130/80, Colon cancer screening, and Preop general physical exam were also pertinent to this visit. over  half of which involved counseling and coordination of care.    Patient Instructions   (M17.11) Primary osteoarthritis of right knee  (primary encounter diagnosis)  Comment:    Plan: Albumin Random Urine Quantitative, Hemoglobin         A1c, predniSONE (DELTASONE) 20 MG tablet             (E11.9) Controlled type 2 diabetes mellitus without complication, without long-term current use of insulin (H)  Comment:    Plan:      (E11.22,  N18.1) Type 2 diabetes mellitus with stage 1 chronic kidney disease, without long-term current use of insulin (H)  Comment:    Plan:      (E78.5) Hyperlipidemia with target LDL less than 100  Comment:    Plan: ALT             (I10) Hypertension goal BP (blood pressure) < 130/80  Comment:    Plan: Basic metabolic panel             (Z12.11) Colon cancer screening  Comment:    Plan: Fecal colorectal cancer screen (FIT)             (Z01.818) Preop general physical exam  Comment:    Plan: EKG 12-lead complete w/read - Clinics, XR Chest        2 Views, CBC with platelets differential                      Sarah THE ABOVE PROBLEMS ARE STABLE AND MED CHANGES AS NOTED    Diet:  NORMAL     Exercise:  NORMAL   Exercises Range of motion, balance, isometric, and strengthening exercises 30 repetitions twice daily of involved joints  NORMAL CHEST XRAY AND ELECTROCARDIOGRAM WITHIN NORMAL LIMITS   NO SIGNIFICANT CHANGE  RETURN TO CLINIC  ONE WEEK     .EDUARDA LAMBERT MD 6/8/2017 7:44 AM  June 8, 2017

## 2017-06-08 NOTE — PATIENT INSTRUCTIONS
(M17.11) Primary osteoarthritis of right knee  (primary encounter diagnosis)  Comment:    Plan: Albumin Random Urine Quantitative, Hemoglobin         A1c, predniSONE (DELTASONE) 20 MG tablet             (E11.9) Controlled type 2 diabetes mellitus without complication, without long-term current use of insulin (H)  Comment:    Plan:      (E11.22,  N18.1) Type 2 diabetes mellitus with stage 1 chronic kidney disease, without long-term current use of insulin (H)  Comment:    Plan:      (E78.5) Hyperlipidemia with target LDL less than 100  Comment:    Plan: ALT             (I10) Hypertension goal BP (blood pressure) < 130/80  Comment:    Plan: Basic metabolic panel             (Z12.11) Colon cancer screening  Comment:    Plan: Fecal colorectal cancer screen (FIT)             (Z01.818) Preop general physical exam  Comment:    Plan: EKG 12-lead complete w/read - Clinics, XR Chest        2 Views, CBC with platelets differential    NORMAL CHEST XRAY AND ELECTROCARDIOGRAM WITHIN NORMAL LIMITS   NO SIGNIFICANT CHANGE  RETURN TO CLINIC  ONE WEEK

## 2017-06-09 NOTE — PROGRESS NOTES
Please send normal lab letter when labs are complete  NORMAL DIABETES URINE PROTEIN TEST   NORMAL GLUCOSE, RENAL AND BLOOD SALTS  EXCEPT HIGH FASTING BLOOD SUGAR 170MG%  NORMAL LIVER FUNCTION TEST   NORMAL THREE MONTH GLUCOSE AVERAGE   A1C 5.0 AVERAGE GLUCOSE   90  A1C 6.0 AVERAGE GLUCOSE 120  A1C 6.5 AVERAGE GLUCOSE 135  A1C 6.8 AVERAGE GLUCOSE 144  A1C 7.0 AVERAGE GLUCOSE 150  A1C 8.0 AVERAGE GLUCOSE 180  NORMAL COMPLETE BLOOD PANEL WBCS RBCS AND PLATELETS   EDUARDA LAMBERT JR., MD

## 2017-06-11 PROCEDURE — 82274 ASSAY TEST FOR BLOOD FECAL: CPT | Performed by: FAMILY MEDICINE

## 2017-06-12 DIAGNOSIS — Z12.11 COLON CANCER SCREENING: ICD-10-CM

## 2017-06-12 LAB — HEMOCCULT STL QL IA: NEGATIVE

## 2017-06-12 NOTE — LETTER
[facility]  [address]  [address]  [place] [place] [facility] [place] [id number] [phone]                                                                                                           Chivo Urias  [address]  [address]  [place] [address]    June 13, 2017      Dear Chivo,    The results of your recent tests were reviewed and are enclosed.   NORMAL FECAL COLORECTAL CANCER SCREEN   Results for orders placed or performed in visit on 06/12/17   Fecal colorectal cancer screen (FIT)   Result Value Ref Range    Occult Blood Scn FIT Negative NEG           Thank you for choosing [facility].  We appreciate the opportunity to serve you and look forward to supporting your healthcare needs in the future.    If you have any questions or concerns, please call me or my staff at [phone].      Sincerely,    Darrin Hernandez Jr MD

## 2017-06-15 ENCOUNTER — OFFICE VISIT (OUTPATIENT)
Dept: FAMILY MEDICINE | Facility: CLINIC | Age: 72
End: 2017-06-15
Payer: COMMERCIAL

## 2017-06-15 VITALS
TEMPERATURE: 97.2 F | SYSTOLIC BLOOD PRESSURE: 124 MMHG | OXYGEN SATURATION: 99 % | HEART RATE: 86 BPM | RESPIRATION RATE: 16 BRPM | DIASTOLIC BLOOD PRESSURE: 70 MMHG | WEIGHT: 185.5 LBS | BODY MASS INDEX: 24.14 KG/M2

## 2017-06-15 DIAGNOSIS — E78.5 HYPERLIPIDEMIA WITH TARGET LDL LESS THAN 100: Chronic | ICD-10-CM

## 2017-06-15 DIAGNOSIS — E11.9 CONTROLLED TYPE 2 DIABETES MELLITUS WITHOUT COMPLICATION, WITHOUT LONG-TERM CURRENT USE OF INSULIN (H): Chronic | ICD-10-CM

## 2017-06-15 DIAGNOSIS — M17.11 PRIMARY OSTEOARTHRITIS OF RIGHT KNEE: ICD-10-CM

## 2017-06-15 DIAGNOSIS — E11.22 TYPE 2 DIABETES MELLITUS WITH STAGE 1 CHRONIC KIDNEY DISEASE, WITHOUT LONG-TERM CURRENT USE OF INSULIN (H): Chronic | ICD-10-CM

## 2017-06-15 DIAGNOSIS — N18.1 TYPE 2 DIABETES MELLITUS WITH STAGE 1 CHRONIC KIDNEY DISEASE, WITHOUT LONG-TERM CURRENT USE OF INSULIN (H): Chronic | ICD-10-CM

## 2017-06-15 DIAGNOSIS — Z01.818 PREOP GENERAL PHYSICAL EXAM: Primary | ICD-10-CM

## 2017-06-15 DIAGNOSIS — I10 HYPERTENSION GOAL BP (BLOOD PRESSURE) < 130/80: Chronic | ICD-10-CM

## 2017-06-15 PROCEDURE — 99214 OFFICE O/P EST MOD 30 MIN: CPT | Mod: 25 | Performed by: FAMILY MEDICINE

## 2017-06-15 RX ORDER — PREDNISONE 5 MG/1
5 TABLET ORAL DAILY
Qty: 7 TABLET | Refills: 0 | Status: SHIPPED | OUTPATIENT
Start: 2017-06-15 | End: 2017-09-29

## 2017-06-15 RX ORDER — GLIMEPIRIDE 1 MG/1
1 TABLET ORAL
Qty: 30 TABLET | Refills: 11 | Status: SHIPPED | OUTPATIENT
Start: 2017-06-15 | End: 2017-09-29

## 2017-06-15 NOTE — PROGRESS NOTES
[address]  [address]  [facility] [address]  [phone]  Dept: [phone]    PRE-OP EVALUATION:  Today's date: 6/15/2017    Chivo Urias (: 1945) presents for pre-operative evaluation assessment as requested by Dr. Hollis.  He requires evaluation and anesthesia risk assessment prior to undergoing surgery/procedure for treatment of  RIGHT KNEE .  Proposed procedure:  RIGHT KNEE MENISCUS EVALUATION TREATMENT REPAIR   PAIN RIGHT KNEE PAIN X 6 MONTHS    Date of Surgery/ Procedure: 2017  [facility]  [phone]  Primary Physician: Darrin Hernandez  Type of Anesthesia Anticipated: to be determined    Patient has a Health Care Directive or Living Will:  NO    1. NO - Do you have a history of heart attack, stroke, stent, bypass or surgery on an artery in the head, neck, heart or legs?  2. NO - Do you ever have any pain or discomfort in your chest?  3. NO - Do you have a history of  Heart Failure?  4. NO - Are you troubled by shortness of breath when: walking on the level, up a slight hill or at night?  5. NO - Do you currently have a cold, bronchitis or other respiratory infection?  6. NO - Do you have a cough, shortness of breath or wheezing?  7. NO - Do you sometimes get pains in the calves of your legs when you walk?  8. NO - Do you or anyone in your family have previous history of blood clots?  9. NO - Do you or does anyone in your family have a serious bleeding problem such as prolonged bleeding following surgeries or cuts?  10. NO - Have you ever had problems with anemia or been told to take iron pills?  11. NO - Have you had any abnormal blood loss such as black, tarry or bloody stools, or abnormal vaginal bleeding?  12. NO - Have you ever had a blood transfusion?  13. NO - Have you or any of your relatives ever had problems with  "anesthesia?  14. NO - Do you have sleep apnea, excessive snoring or daytime drowsiness?  15. NO - Do you have any prosthetic heart valves?  16. NO - Do you have prosthetic joints?  17. NO - Is there any chance that you may be pregnant?      HPI:                                                      Brief HPI related to upcoming procedure:  RIGHT  KNEE PAIN X 6 MONTHS \  RIGHT KNEE PAIN   IS FEELING SIGNIFICANT IMPROVEMENT AFTER 5 DAYS PREDNISONE               DIABETES 2 GOAL 8%  WELL CONTROLLED     SEASONAL ALLERGIC RHINITIS OK     GLAUCOMA CONTROLLED     LDL OR \"BAD\" CHOLESTEROL  GOAL < 100 MEDITERRANEAN DIET AND MEDICATIONS     LEFT FOOT PAIN     VITAMIN D REPLACEMENT      LUMBAR DISC DISEASE ONGOING     CHRONIC KIDNEY DISEASE VERY MILD ASSOCIATED with DIABETES 2 GOAL 8%     HYPERTENSION WITH GOAL OF LESS THAN 140/80 CONTROLLED CURRENT MEDICATIONS     RIGHT HIP PAIN       MEDICAL HISTORY:                                                      Patient Active Problem List    Diagnosis Date Noted     Hypertension goal BP (blood pressure) < 130/80 06/19/2013     Priority: High     Stage 1 chronic renal impairment associated with type 2 diabetes mellitus (H) 06/10/2013     Priority: High     Controlled type 2 diabetes mellitus without complication (H) 05/23/2013     Priority: High     Hyperlipidemia with target LDL less than 100 05/23/2013     Priority: High     Diagnosis updated by automated process. Provider to review and confirm.       Enthesopathy of right hip region 09/15/2016     Priority: Medium     ACP (advance care planning) 01/19/2016     Priority: Medium     Advance Care Planning 1/19/2016: ACP Review of Chart / Resources Provided:  Reviewed chart for advance care plan.  Chivo Urias has no plan or code status on file. Discussed available resources and provided with information. Pt declined form at this time.  Added by Stella Khan           Type 2 diabetes, HbA1C goal < 8% (H) 11/02/2015     " Priority: Medium     Right hip pain 10/15/2015     Priority: Medium     Low HDL (under 40) 2013     Priority: Medium     CKD (chronic kidney disease) stage 2, GFR 60-89 ml/min 2013     Priority: Medium     Comprehensive diabetic foot examination, type 2 DM, encounter for (H) 06/10/2013     Priority: Medium     Vitamin D insufficiency 2013     Priority: Medium     DDD (degenerative disc disease), lumbar 2013     Priority: Medium     radic right leg       Foot pain, left 2013     Priority: Medium     Myalgia and myositis 2013     Priority: Medium     Problem list name updated by automated process. Provider to review       Allergic rhinitis 2013     Priority: Medium     Problem list name updated by automated process. Provider to review       Sciatica 2013     Priority: Medium     Glaucoma 2013     Priority: Medium     Problem list name updated by automated process. Provider to review       Health Care Home 2012     Priority: Medium     [facility] Case Management  Laura Romo RN  [phone]   [facility] CARE PLAN SUMMARY    Client Name:  Chivo Urias  Address:  [address] [place] [id number] Phone: [phone] (home)    :  1945 Date of Assessment: 17   Health Plan: [facility]   Health Plan Number: [id number] Medical Assistance Number: [id number]  Financial Worker:  Skyler   Case #:  [id number]   [facility] :  Laura Romo RN  Phone:  [phone]   Fax:  [phone]   [facility] Enrollment Date: 13 Case Mix:  L  Rate Cell:  A  Waiver Type: None   Emergency Contact:Chivo Oliveira  Home Phone: [phone]  Relation: Relative Language:  Belizean  :  CM speaks Belizean   Health Care Agent/POA:  None Advanced Directives/Living Will:  None   Primary Care Clinic/Phone/Fax:  [facility]/(p) [phone], (f) [phone] Primary Dx:  Type 2 DM(E11.9)    Secondary Dx:  Degenerative disc disease (M51.36), Sciatica (M54.30)   Primary Physician:  Darrin Hernandez   Height:  6'1  Weight:  196 lbs   Specialty Physician:  Donny Saleh   Audiologist:  N/A   Eye Care Provider:  Dr. Deloris Manriquezom: [facility] [phone] Dental Care Provider:    [facility]: [facility] [phone] or [phone]   Other[facility]        [facility] CURRENT SERVICES SUMMARY  Equipment owned/DME history:   SERVICE TYPE/PROVIDER NAME/PHONE AUTH DATE FREQUENCY Units OR $ Amt DESCRIPTION   Medical Transportation: [employer] [phone]  Fax:  04/01/17-  03/31/18 as needed N/A    Homemaking: [facility] [phone]    Fax:  04/01/17-  03/31/18 weekly 20 units/week Homemaking                                   Past Medical History:   Diagnosis Date     Arthritis      Controlled type 2 diabetes mellitus without complication (H) 5/23/2013     Diabetes mellitus (H)      Hyperlipidemia 1/7/2013    Problem list name updated by automated process. Provider to review     Hypertension      History reviewed. No pertinent surgical history.  Current Outpatient Prescriptions   Medication Sig Dispense Refill     aspirin 81 MG chewable tablet Take 1 tablet (81 mg) by mouth daily 108 tablet 3     blood glucose monitoring (MIKHAIL MICROLET) lancets 1 each 2 times daily 1 Box prn     metFORMIN (GLUCOPHAGE-XR) 500 MG 24 hr tablet TAKE 1 TABLET BY MOUTH TWICE DAILY WITH MEALS 60 tablet 11     cholecalciferol (VITAMIN D-1000 MAX ST) 1000 UNITS TABS Take 3 tablets by mouth daily 90 tablet 11     fluticasone (FLONASE) 50 MCG/ACT spray Spray 1-2 sprays into both nostrils daily 16 g 11     levocetirizine (XYZAL) 5 MG tablet Take 1 tablet (5 mg) by mouth every evening 30 tablet 11     acetaminophen (TYLENOL) 500 MG tablet Take 1-2 tablets (500-1,000 mg) by mouth every 6 hours as needed 100 tablet 11     atorvastatin (LIPITOR) 10 MG tablet Take 1 tablet (10 mg) by mouth  daily 30 tablet 11     blood glucose monitoring (MIKHAIL CONTOUR NEXT) test strip 1 strip by In Vitro route daily 100 each 11     ketotifen (ZADITOR) 0.025 % SOLN ophthalmic solution Place 1 drop into both eyes 2 times daily 1 Bottle 11     blood glucose monitoring (MIKHAIL CONTOUR MONITOR) meter device kit Use to test blood sugars TWICE DAILY  times daily or as directed. 1 kit 0     ranitidine (ZANTAC) 150 MG tablet Take 1 tablet (150 mg) by mouth 2 times daily 60 tablet 11     Blood Pressure Monitoring (MICROLIFE WRIST BP MONITOR) TATIANA 1 Device 2 times daily 1 Device 0     gabapentin (NEURONTIN) 100 MG capsule Take 1 capsule (100 mg) by mouth every evening as needed (Patient not taking: Reported on 6/15/2017) 30 capsule 11     cyanocobalamin (VITAMIN  B-12) 1000 MCG tablet Take 1 tablet (1,000 mcg) by mouth daily (Patient not taking: Reported on 6/15/2017) 100 tablet 3     triamcinolone (KENALOG) 0.1 % cream Apply sparingly to affected area three times daily as needed (Patient not taking: Reported on 6/15/2017) 80 g 2     lidocaine (XYLOCAINE) 5 % ointment One application 4 x daily to affected areas lower back and knees if necessary (Patient not taking: Reported on 6/15/2017) 142 g 11     finasteride (PROSCAR) 5 MG tablet Take 1 tablet (5 mg) by mouth daily (Patient not taking: Reported on 6/15/2017) 30 tablet 1     tamsulosin (FLOMAX) 0.4 MG capsule Take 1 capsule (0.4 mg) by mouth daily (Patient not taking: Reported on 6/15/2017) 90 capsule 1     Trolamine Salicylate (ASPERCREME) 10 % LOTN Externally apply topically 4 times daily as needed (Patient not taking: Reported on 6/15/2017) 120 mL 11     lisinopril (PRINIVIL/ZESTRIL) 2.5 MG tablet Take 1 tablet (2.5 mg) by mouth daily (Patient not taking: Reported on 6/15/2017) 30 tablet 11     OTC products: None, except as noted above    No Known Allergies   Latex Allergy: NO    Social History   Substance Use Topics     Smoking status: Never Smoker     Smokeless tobacco:  Never Used     Alcohol use No     History   Drug Use No       REVIEW OF SYSTEMS:                                                    Review Of Systems  Skin: negative for, pigmentation, acne, rash, scaling, itching, bruising, lumps or bumps, hair changes, nail changes  Eyes: negative for, as above, visual blurring, double vision, glaucoma, cataracts, eye pain, contacts, photophobia, redness, tearing, itching  Ears/Nose/Throat: negative for, nasal congestion, postnasal drainage, hearing loss, deafness, tinnitus, vertigo, epistaxis, deviated septum, frequent URI's, dental problem  Respiratory: No shortness of breath, dyspnea on exertion, cough, or hemoptysis  Cardiovascular: palpitations, tachycardia, irregular heart beat, chest pain, exertional chest pain or pressure, paroxysmal nocturnal dyspnea, dyspnea on exertion and orthopnea  Gastrointestinal: negative for, poor appetite, dysphagia, nausea, vomiting, heartburn, dyspepsia, reflux, hematemesis, abdominal pain, excessive gas or bloating, hemorrhoids, melena, hematochezia, constipation, diarrhea, stomach or duodenal ulcer, gallbladder trouble and jaundice  Genitourinary: nocturia x  4, nocturia, dysuria, frequency, urgency, hesitancy, hematuria and retention  Musculoskeletal: joint pain and  RIGHT KNEE PAIN IMIPROVED with PREDNISON PULSE NOW TAPERING  Neurologic: negative for, migraine headaches, headaches, syncope, stroke, seizures, paralysis, numbness or tingling of hands, numbness or tingling of feet, involuntary movements, speech problems, incoordination, memory problems, behavior changes and tremor  Psychiatric: negative for, feeling anxious, nervous breakdown, depression and depression stable  Hematologic/Lymphatic/Immunologic: weight loss  Endocrine: negative and diabetes      EXAM:                                                    /70  Pulse 86  Temp 97.2  F (36.2  C) (Tympanic)  Resp 16  Wt 185 lb 8 oz (84.1 kg)  SpO2 99%  BMI 24.14 kg/m2     GENERAL APPEARANCE: healthy, alert and no distress     EYES: EOMI,  PERRL     HENT: ear canals and TM's normal and nose and mouth without ulcers or lesions     NECK: no adenopathy, no asymmetry, masses, or scars and thyroid normal to palpation     RESP: lungs clear to auscultation - no rales, rhonchi or wheezes     CV: regular rates and rhythm, normal S1 S2, no S3 or S4 and no murmur, click or rub     ABDOMEN:  soft, nontender, no HSM or masses and bowel sounds normal     MS: extremities normal- no gross deformities noted, no evidence of inflammation in joints, FROM in all extremities.     SKIN: no suspicious lesions or rashes     NEURO: Normal strength and tone, sensory exam grossly normal, mentation intact and speech normal     PSYCH: mentation appears normal. and affect normal/bright     LYMPHATICS: No axillary, cervical, or supraclavicular nodes      OSTEOARTHRITIS KNEES IMPROVED with NORMAL RANGE OF MOTION NORMAL AT PRESENT   CHRONIC LOWER BACK PAIN IMPROVED with IMPROVED RANGE OF MOTION OF BACK   NORMAL STRAIGHT LEG RAISING TEST     DIAGNOSTICS:                                                    EKG: appears normal, NSR, normal axis, normal intervals, no acute ST/T changes c/w ischemia, no LVH by voltage criteria, unchanged from previous tracings  Chest XRay      Recent Labs   Lab Test  06/08/17   0808  11/10/16   1028   03/04/14   1008   HGB  15.6   --    --   16.1   PLT  193   --    --   140*   NA  139  139   < >  138   POTASSIUM  4.0  4.2   < >  4.3   CR  0.88  1.08   < >  1.10   A1C  7.6*  6.9*   < >  6.1*    < > = values in this interval not displayed.        IMPRESSION:                                                    Reason for surgery/procedure:  RIGHT KNEE PAIN   Diagnosis/reason for consult:  NORMAL     The proposed surgical procedure is considered INTERMEDIATE risk.    REVISED CARDIAC RISK INDEX  The patient has the following serious cardiovascular risks for perioperative complications such as  (MI, PE, VFib and 3  AV Block):  No serious cardiac risks  INTERPRETATION: 2 risks: Class III (moderate risk - 6.6% complication rate)    The patient has the following additional risks for perioperative complications:  No identified additional risks  The 10-year ASCVD risk score (Celestino RIVAS Jr, et al., 2013) is: 33.2%    Values used to calculate the score:      Age: 71 years      Sex: Male      Is Non- : Yes      Diabetic: Yes      Tobacco smoker: No      Systolic Blood Pressure: 124 mmHg      Is BP treated: Yes      HDL Cholesterol: 42 mg/dL      Total Cholesterol: 147 mg/dL      ICD-10-CM    1. Preop general physical exam Z01.818      Component      Latest Ref Rng & Units 6/8/2017   WBC      4.0 - 11.0 10e9/L 4.8     Component      Latest Ref Rng & Units 12/8/2016 6/8/2017 6/11/2017   WBC      4.0 - 11.0 10e9/L  4.8    RBC Count      4.4 - 5.9 10e12/L  5.09    Hemoglobin      13.3 - 17.7 g/dL  15.6    Hematocrit      40.0 - 53.0 %  45.0    MCV      78 - 100 fl  88    MCH      26.5 - 33.0 pg  30.6    MCHC      31.5 - 36.5 g/dL  34.7    RDW      10.0 - 15.0 %  13.0    Platelet Count      150 - 450 10e9/L  193    Diff Method        Automated Method    % Neutrophils      %  57.2    % Lymphocytes      %  26.1    % Monocytes      %  11.1    % Eosinophils      %  5.4    % Basophils      %  0.2    Absolute Neutrophil      1.6 - 8.3 10e9/L  2.7    Absolute Lymphocytes      0.8 - 5.3 10e9/L  1.3    Absolute Monocytes      0.0 - 1.3 10e9/L  0.5    Absolute Eosinophils      0.0 - 0.7 10e9/L  0.3    Absolute Basophils      0.0 - 0.2 10e9/L  0.0    Sodium      133 - 144 mmol/L  139    Potassium      3.4 - 5.3 mmol/L  4.0    Chloride      94 - 109 mmol/L  106    Carbon Dioxide      20 - 32 mmol/L  21    Anion Gap      3 - 14 mmol/L  12    Glucose      70 - 99 mg/dL  170 (H)    Urea Nitrogen      7 - 30 mg/dL  14    Creatinine      0.66 - 1.25 mg/dL  0.88    GFR Estimate      >60 mL/min/1.7m2  85    GFR  Estimate If Black      >60 mL/min/1.7m2  >90 . . .    Calcium      8.5 - 10.1 mg/dL  8.9    Creatinine Urine      mg/dL  186    Albumin Urine mg/L      mg/L  7    Albumin Urine mg/g Cr      0 - 17 mg/g Cr  3.73    TSH      0.40 - 5.00 mU/L 0.99     PSA      0 - 4 ug/L 1.37     Hemoglobin A1C      4.3 - 6.0 %  7.6 (H)    ALT      0 - 70 U/L  23    Occult Blood Scn FIT      NEG   Negative     Show images for XR Chest 2 Views   Study Result   XR CHEST 2 VW 6/8/2017 8:42 AM     COMPARISON: 1/19/2016     HISTORY: Preoperative evaluation.         IMPRESSION: Cardiac silhouette and pulmonary vasculature are within  normal limits. No focal airspace disease, pleural effusion or  pneumothorax.     RUPA FERGUSON    NORMAL  ELECTROCARDIOGRAM EXCEPT MINIMAL RIGHT ATRIAL ENLARGEMENT   NOT SIGNIFICANT   EDUARDA LAMBERT JR., MD        RECOMMENDATIONS:                                                      --Consult hospital rounder / IM to assist post-op medical management    --Patient is to take all scheduled medications on the day of surgery EXCEPT for modifications listed below.    APPROVAL GIVEN to proceed with proposed procedure, without further diagnostic evaluation       Signed Electronically by: EDUARDA LAMBERT MD    Copy of this evaluation report is provided to requesting physician.    [facility] Preop Guidelines

## 2017-06-15 NOTE — PATIENT INSTRUCTIONS
Before Your Surgery      Call your surgeon if there is any change in your health. This includes signs of a cold or flu (such as a sore throat, runny nose, cough, rash or fever).    Do not smoke, drink alcohol or take over the counter medicine (unless your surgeon or primary care doctor tells you to) for the 24 hours before and after surgery.    If you take prescribed drugs: Follow your doctor s orders about which medicines to take and which to stop until after surgery.    Eating and drinking prior to surgery: follow the instructions from your surgeon    Take a shower or bath the night before surgery. Use the soap your surgeon gave you to gently clean your skin. If you do not have soap from your surgeon, use your regular soap. Do not shave or scrub the surgery site.  Wear clean pajamas and have clean sheets on your bed.     ICD-10-CM    1. Preop general physical exam Z01.818    2. Primary osteoarthritis of right knee M17.11 predniSONE (DELTASONE) 5 MG tablet   3. Controlled type 2 diabetes mellitus without complication, without long-term current use of insulin (H) E11.9 glimepiride (AMARYL) 1 MG tablet   4. Hyperlipidemia with target LDL less than 100 E78.5    5. Type 2 diabetes mellitus with stage 1 chronic kidney disease, without long-term current use of insulin (H) E11.22     N18.1    6. Hypertension goal BP (blood pressure) < 130/80 I10

## 2017-06-15 NOTE — MR AVS SNAPSHOT
After Visit Summary   6/15/2017    Chivo Urias    MRN: [id number]           Patient Information     Date Of Birth          1945        Visit Information        Provider Department      6/15/2017 9:00 AM Darrin Hernandez MD; [employer] [facility]        Today's Diagnoses     Preop general physical exam    -  1    Primary osteoarthritis of right knee        Controlled type 2 diabetes mellitus without complication, without long-term current use of insulin (H)        Hyperlipidemia with target LDL less than 100        Type 2 diabetes mellitus with stage 1 chronic kidney disease, without long-term current use of insulin (H)        Hypertension goal BP (blood pressure) < 130/80          Care Instructions      Before Your Surgery      Call your surgeon if there is any change in your health. This includes signs of a cold or flu (such as a sore throat, runny nose, cough, rash or fever).    Do not smoke, drink alcohol or take over the counter medicine (unless your surgeon or primary care doctor tells you to) for the 24 hours before and after surgery.    If you take prescribed drugs: Follow your doctor s orders about which medicines to take and which to stop until after surgery.    Eating and drinking prior to surgery: follow the instructions from your surgeon    Take a shower or bath the night before surgery. Use the soap your surgeon gave you to gently clean your skin. If you do not have soap from your surgeon, use your regular soap. Do not shave or scrub the surgery site.  Wear clean pajamas and have clean sheets on your bed.     ICD-10-CM    1. Preop general physical exam Z01.818    2. Primary osteoarthritis of right knee M17.11 predniSONE (DELTASONE) 5 MG tablet   3. Controlled type 2 diabetes mellitus without complication, without long-term current use of insulin (H) E11.9 glimepiride (AMARYL) 1 MG tablet   4. Hyperlipidemia with  "target LDL less than 100 E78.5    5. Type 2 diabetes mellitus with stage 1 chronic kidney disease, without long-term current use of insulin (H) E11.22     N18.1    6. Hypertension goal BP (blood pressure) < 130/80 I10                    Follow-ups after your visit        Who to contact     If you have questions or need follow up information about today's clinic visit or your schedule please contact [facility] directly at [phone].  Normal or non-critical lab and imaging results will be communicated to you by MyChart, letter or phone within 4 business days after the clinic has received the results. If you do not hear from us within 7 days, please contact the clinic through [employer] or phone. If you have a critical or abnormal lab result, we will notify you by phone as soon as possible.  Submit refill requests through [employer] or call your pharmacy and they will forward the refill request to us. Please allow 3 business days for your refill to be completed.          Additional Information About Your Visit        [employer] Information     [employer] lets you send messages to your doctor, view your test results, renew your prescriptions, schedule appointments and more. To sign up, go to [email or web address] . Click on \"Log in\" on the left side of the screen, which will take you to the Welcome page. Then click on \"Sign up Now\" on the right side of the page.     You will be asked to enter the access code listed below, as well as some personal information. Please follow the directions to create your username and password.     Your access code is: [email or web address]  Expires: 2017  8:05 AM     Your access code will  in 90 days. If you need help or a new code, please call your [facility] clinic or [phone].        Care EveryWhere ID     This is your Care EveryWhere ID. This could be used by other organizations to access your [facility] medical records  [id number]        Your Vitals " Were     Pulse Temperature Respirations Pulse Oximetry BMI (Body Mass Index)       86 97.2  F (36.2  C) (Tympanic) 16 99% 24.14 kg/m2        Blood Pressure from Last 3 Encounters:   06/15/17 124/70   06/08/17 120/76   12/08/16 124/64    Weight from Last 3 Encounters:   06/15/17 185 lb 8 oz (84.1 kg)   06/08/17 188 lb (85.3 kg)   12/08/16 196 lb (88.9 kg)              Today, you had the following     No orders found for display         Today's Medication Changes          These changes are accurate as of: 6/15/17  9:55 AM.  If you have any questions, ask your nurse or doctor.               Start taking these medicines.        Dose/Directions    glimepiride 1 MG tablet   Commonly known as:  AMARYL   Used for:  Controlled type 2 diabetes mellitus without complication, without long-term current use of insulin (H)   Started by:  Darrin Hernandez MD        Dose:  1 mg   Take 1 tablet (1 mg) by mouth every morning (before breakfast)   Quantity:  30 tablet   Refills:  11       predniSONE 5 MG tablet   Commonly known as:  DELTASONE   Used for:  Primary osteoarthritis of right knee   Started by:  Darrin Hernandez MD        Dose:  5 mg   Take 1 tablet (5 mg) by mouth daily   Quantity:  7 tablet   Refills:  0            Where to get your medicines      These medications were sent to [facility] - [place], [place] - [address] [place] [address]  1 [facility] [address] [place] [address]     Phone:  [phone]     glimepiride 1 MG tablet    predniSONE 5 MG tablet                Primary Care Provider Office Phone # Fax #    Darrin Hernandez [phone]       [facility] [address]  [facility] [address]        Thank you!     Thank you for choosing [facility]  for your care. Our goal is always to provide you with excellent care. Hearing back from our patients is one way we can continue to improve our services. Please take a few minutes to  complete the written survey that you may receive in the mail after your visit with us. Thank you!             Your Updated Medication List - Protect others around you: Learn how to safely use, store and throw away your medicines at www.disposemymeds.org.          This list is accurate as of: 6/15/17  9:55 AM.  Always use your most recent med list.                   Brand Name Dispense Instructions for use    acetaminophen 500 MG tablet    TYLENOL    100 tablet    Take 1-2 tablets (500-1,000 mg) by mouth every 6 hours as needed       aspirin 81 MG chewable tablet     108 tablet    Take 1 tablet (81 mg) by mouth daily       atorvastatin 10 MG tablet    LIPITOR    30 tablet    Take 1 tablet (10 mg) by mouth daily       blood glucose monitoring lancets     1 Box    1 each 2 times daily       blood glucose monitoring meter device kit     1 kit    Use to test blood sugars TWICE DAILY  times daily or as directed.       blood glucose monitoring test strip    MIKHAIL CONTOUR NEXT    100 each    1 strip by In Vitro route daily       cholecalciferol 1000 UNITS Tabs    VITAMIN D-1000 MAX ST    90 tablet    Take 3 tablets by mouth daily       cyanocobalamin 1000 MCG tablet    vitamin  B-12    100 tablet    Take 1 tablet (1,000 mcg) by mouth daily       finasteride 5 MG tablet    PROSCAR    30 tablet    Take 1 tablet (5 mg) by mouth daily       fluticasone 50 MCG/ACT spray    FLONASE    16 g    Spray 1-2 sprays into both nostrils daily       gabapentin 100 MG capsule    NEURONTIN    30 capsule    Take 1 capsule (100 mg) by mouth every evening as needed       glimepiride 1 MG tablet    AMARYL    30 tablet    Take 1 tablet (1 mg) by mouth every morning (before breakfast)       ketotifen 0.025 % Soln ophthalmic solution    ZADITOR    1 Bottle    Place 1 drop into both eyes 2 times daily       levocetirizine 5 MG tablet    XYZAL    30 tablet    Take 1 tablet (5 mg) by mouth every evening       lidocaine 5 % ointment    XYLOCAINE    142  g    One application 4 x daily to affected areas lower back and knees if necessary       lisinopril 2.5 MG tablet    PRINIVIL/Zestril    30 tablet    Take 1 tablet (2.5 mg) by mouth daily       metFORMIN 500 MG 24 hr tablet    GLUCOPHAGE-XR    60 tablet    TAKE 1 TABLET BY MOUTH TWICE DAILY WITH MEALS       MICROLIFE WRIST BP MONITOR Monet     1 Device    1 Device 2 times daily       predniSONE 5 MG tablet    DELTASONE    7 tablet    Take 1 tablet (5 mg) by mouth daily       ranitidine 150 MG tablet    ZANTAC    60 tablet    Take 1 tablet (150 mg) by mouth 2 times daily       tamsulosin 0.4 MG capsule    FLOMAX    90 capsule    Take 1 capsule (0.4 mg) by mouth daily       triamcinolone 0.1 % cream    KENALOG    80 g    Apply sparingly to affected area three times daily as needed       Trolamine Salicylate 10 % Lotn    ASPERCREME    120 mL    Externally apply topically 4 times daily as needed

## 2017-07-12 ENCOUNTER — TELEPHONE (OUTPATIENT)
Dept: FAMILY MEDICINE | Facility: CLINIC | Age: 72
End: 2017-07-12

## 2017-07-12 NOTE — TELEPHONE ENCOUNTER
Jazlyn from [facility] called requesting Pre-op paperwork faxed to [phone].  Pre-op 6/15/17 OV, labs from 6/8/17, EKG 6/8/17 faxed.

## 2017-08-07 ENCOUNTER — TRANSFERRED RECORDS (OUTPATIENT)
Dept: HEALTH INFORMATION MANAGEMENT | Facility: CLINIC | Age: 72
End: 2017-08-07

## 2017-09-29 ENCOUNTER — OFFICE VISIT (OUTPATIENT)
Dept: FAMILY MEDICINE | Facility: CLINIC | Age: 72
End: 2017-09-29
Payer: COMMERCIAL

## 2017-09-29 VITALS
OXYGEN SATURATION: 99 % | BODY MASS INDEX: 24.66 KG/M2 | HEART RATE: 65 BPM | TEMPERATURE: 96.9 F | SYSTOLIC BLOOD PRESSURE: 104 MMHG | RESPIRATION RATE: 16 BRPM | WEIGHT: 189.5 LBS | DIASTOLIC BLOOD PRESSURE: 64 MMHG

## 2017-09-29 DIAGNOSIS — K21.9 GASTROESOPHAGEAL REFLUX DISEASE WITHOUT ESOPHAGITIS: ICD-10-CM

## 2017-09-29 DIAGNOSIS — I10 BENIGN ESSENTIAL HYPERTENSION: ICD-10-CM

## 2017-09-29 DIAGNOSIS — N40.0 BENIGN NON-NODULAR PROSTATIC HYPERPLASIA WITHOUT LOWER URINARY TRACT SYMPTOMS: ICD-10-CM

## 2017-09-29 DIAGNOSIS — E78.5 HYPERLIPIDEMIA LDL GOAL <100: ICD-10-CM

## 2017-09-29 DIAGNOSIS — Z23 NEED FOR PROPHYLACTIC VACCINATION AND INOCULATION AGAINST INFLUENZA: ICD-10-CM

## 2017-09-29 DIAGNOSIS — Z91.81 AT RISK FOR FALLING: ICD-10-CM

## 2017-09-29 DIAGNOSIS — Z12.11 SPECIAL SCREENING FOR MALIGNANT NEOPLASMS, COLON: ICD-10-CM

## 2017-09-29 DIAGNOSIS — E11.9 CONTROLLED TYPE 2 DIABETES MELLITUS WITHOUT COMPLICATION, WITHOUT LONG-TERM CURRENT USE OF INSULIN (H): Chronic | ICD-10-CM

## 2017-09-29 DIAGNOSIS — Z12.5 SPECIAL SCREENING FOR MALIGNANT NEOPLASM OF PROSTATE: Primary | ICD-10-CM

## 2017-09-29 DIAGNOSIS — Z23 NEED FOR PROPHYLACTIC VACCINATION AGAINST STREPTOCOCCUS PNEUMONIAE (PNEUMOCOCCUS): ICD-10-CM

## 2017-09-29 LAB
ALT SERPL W P-5'-P-CCNC: 24 U/L (ref 0–70)
ANION GAP SERPL CALCULATED.3IONS-SCNC: 9 MMOL/L (ref 3–14)
BUN SERPL-MCNC: 14 MG/DL (ref 7–30)
CALCIUM SERPL-MCNC: 9.1 MG/DL (ref 8.5–10.1)
CHLORIDE SERPL-SCNC: 107 MMOL/L (ref 94–109)
CHOLEST SERPL-MCNC: 140 MG/DL
CO2 SERPL-SCNC: 24 MMOL/L (ref 20–32)
CREAT SERPL-MCNC: 0.9 MG/DL (ref 0.66–1.25)
CREAT UR-MCNC: 99 MG/DL
ERYTHROCYTE [DISTWIDTH] IN BLOOD BY AUTOMATED COUNT: 13.4 % (ref 10–15)
GFR SERPL CREATININE-BSD FRML MDRD: 83 ML/MIN/1.7M2
GLUCOSE SERPL-MCNC: 105 MG/DL (ref 70–99)
HBA1C MFR BLD: 7.1 % (ref 4.3–6)
HCT VFR BLD AUTO: 44.5 % (ref 40–53)
HDLC SERPL-MCNC: 40 MG/DL
HGB BLD-MCNC: 15 G/DL (ref 13.3–17.7)
LDLC SERPL CALC-MCNC: 39 MG/DL
MCH RBC QN AUTO: 30.1 PG (ref 26.5–33)
MCHC RBC AUTO-ENTMCNC: 33.7 G/DL (ref 31.5–36.5)
MCV RBC AUTO: 89 FL (ref 78–100)
MICROALBUMIN UR-MCNC: <5 MG/L
MICROALBUMIN/CREAT UR: NORMAL MG/G CR (ref 0–17)
NONHDLC SERPL-MCNC: 100 MG/DL
PLATELET # BLD AUTO: 190 10E9/L (ref 150–450)
POTASSIUM SERPL-SCNC: 4.2 MMOL/L (ref 3.4–5.3)
PSA SERPL-ACNC: 1.36 UG/L (ref 0–4)
RBC # BLD AUTO: 4.98 10E12/L (ref 4.4–5.9)
SODIUM SERPL-SCNC: 140 MMOL/L (ref 133–144)
TRIGL SERPL-MCNC: 306 MG/DL
WBC # BLD AUTO: 6.2 10E9/L (ref 4–11)

## 2017-09-29 PROCEDURE — 99214 OFFICE O/P EST MOD 30 MIN: CPT | Performed by: FAMILY MEDICINE

## 2017-09-29 PROCEDURE — 36415 COLL VENOUS BLD VENIPUNCTURE: CPT | Performed by: FAMILY MEDICINE

## 2017-09-29 PROCEDURE — 82043 UR ALBUMIN QUANTITATIVE: CPT | Performed by: FAMILY MEDICINE

## 2017-09-29 PROCEDURE — 82274 ASSAY TEST FOR BLOOD FECAL: CPT | Performed by: FAMILY MEDICINE

## 2017-09-29 PROCEDURE — 84460 ALANINE AMINO (ALT) (SGPT): CPT | Performed by: FAMILY MEDICINE

## 2017-09-29 PROCEDURE — 84153 ASSAY OF PSA TOTAL: CPT | Performed by: FAMILY MEDICINE

## 2017-09-29 PROCEDURE — 99207 ZZC FOOT EXAM  NO CHARGE: CPT | Performed by: FAMILY MEDICINE

## 2017-09-29 PROCEDURE — 85027 COMPLETE CBC AUTOMATED: CPT | Performed by: FAMILY MEDICINE

## 2017-09-29 PROCEDURE — 80048 BASIC METABOLIC PNL TOTAL CA: CPT | Performed by: FAMILY MEDICINE

## 2017-09-29 PROCEDURE — 80061 LIPID PANEL: CPT | Performed by: FAMILY MEDICINE

## 2017-09-29 PROCEDURE — 83036 HEMOGLOBIN GLYCOSYLATED A1C: CPT | Performed by: FAMILY MEDICINE

## 2017-09-29 RX ORDER — TAMSULOSIN HYDROCHLORIDE 0.4 MG/1
0.4 CAPSULE ORAL DAILY
Qty: 90 CAPSULE | Refills: 3 | Status: SHIPPED | OUTPATIENT
Start: 2017-09-29 | End: 2018-07-23

## 2017-09-29 RX ORDER — BLOOD PRESSURE TEST KIT-MEDIUM
1 KIT MISCELLANEOUS 2 TIMES DAILY PRN
Qty: 1 EACH | Refills: 0 | Status: SHIPPED | OUTPATIENT
Start: 2017-09-29

## 2017-09-29 RX ORDER — GLIMEPIRIDE 1 MG/1
1 TABLET ORAL
Qty: 30 TABLET | Refills: 11 | Status: SHIPPED | OUTPATIENT
Start: 2017-09-29 | End: 2018-07-23

## 2017-09-29 RX ORDER — FINASTERIDE 5 MG/1
5 TABLET, FILM COATED ORAL DAILY
Qty: 90 TABLET | Refills: 3 | Status: SHIPPED | OUTPATIENT
Start: 2017-09-29 | End: 2018-07-23

## 2017-09-29 RX ORDER — TAMSULOSIN HYDROCHLORIDE 0.4 MG/1
0.4 CAPSULE ORAL DAILY
Qty: 90 CAPSULE | Refills: 1 | Status: SHIPPED | OUTPATIENT
Start: 2017-09-29 | End: 2017-09-29

## 2017-09-29 RX ORDER — FINASTERIDE 5 MG/1
5 TABLET, FILM COATED ORAL DAILY
Qty: 30 TABLET | Refills: 1 | Status: SHIPPED | OUTPATIENT
Start: 2017-09-29 | End: 2017-09-29

## 2017-09-29 NOTE — MR AVS SNAPSHOT
After Visit Summary   9/29/2017    Chivo Urias    MRN: [id number]           Patient Information     Date Of Birth          1945        Visit Information        Provider Department      9/29/2017 8:00 AM Darrin Hernandez MD; [employer] [facility]        Today's Diagnoses     Special screening for malignant neoplasm of prostate    -  1    Controlled type 2 diabetes mellitus without complication, without long-term current use of insulin (H)        Hyperlipidemia LDL goal <100        At risk for falling        Need for prophylactic vaccination and inoculation against influenza        Need for prophylactic vaccination against Streptococcus pneumoniae (pneumococcus)        Benign non-nodular prostatic hyperplasia without lower urinary tract symptoms        Gastroesophageal reflux disease without esophagitis        Benign essential hypertension          Care Instructions      (Z12.5) Special screening for malignant neoplasm of prostate  (primary encounter diagnosis)    Comment:      Plan: Prostate spec antigen screen                   (Z91.81) At risk for falling    Comment:      Plan:          (Z23) Need for prophylactic vaccination and inoculation against influenza    Comment:      Plan:          (Z23) Need for prophylactic vaccination against Streptococcus pneumoniae (pneumococcus)    Comment:      Plan:          (E11.9) Controlled type 2 diabetes mellitus without complication, without long-term current use of insulin (H)    Comment:      Plan: Basic metabolic panel, Lipid panel reflex to           direct LDL, Hemoglobin A1c, Albumin Random           Urine Quantitative with Creat Ratio,           glimepiride (AMARYL) 1 MG tablet                   (N40.0) Benign non-nodular prostatic hyperplasia without lower urinary tract symptoms    Comment:      Plan: tamsulosin (FLOMAX) 0.4 MG capsule, finasteride          (PROSCAR) 5 MG  "tablet, DISCONTINUED: tamsulosin          (FLOMAX) 0.4 MG capsule, DISCONTINUED:           finasteride (PROSCAR) 5 MG tablet                   (K21.9) Gastroesophageal reflux disease without esophagitis    Comment:  AT RISK FOR ANEMIA    Plan: CBC with platelets                   (E78.5) Hyperlipidemia LDL goal <100    Comment:   FASTING     Plan: Lipid panel reflex to direct LDL, ALT                   (I10) Benign essential hypertension    Comment:      Plan: Blood Pressure Monitoring (WRIST BLOOD PRESSURE          MONITOR) MISC                              Follow-ups after your visit        Who to contact     If you have questions or need follow up information about today's clinic visit or your schedule please contact [facility] directly at [phone].  Normal or non-critical lab and imaging results will be communicated to you by [facility], letter or phone within 4 business days after the clinic has received the results. If you do not hear from us within 7 days, please contact the clinic through [facility] or phone. If you have a critical or abnormal lab result, we will notify you by phone as soon as possible.  Submit refill requests through [employer] or call your pharmacy and they will forward the refill request to us. Please allow 3 business days for your refill to be completed.          Additional Information About Your Visit        MyChart Information     [employer] lets you send messages to your doctor, view your test results, renew your prescriptions, schedule appointments and more. To sign up, go to [email or web address] . Click on \"Log in\" on the left side of the screen, which will take you to the Welcome page. Then click on \"Sign up Now\" on the right side of the page.     You will be asked to enter the access code listed below, as well as some personal information. Please follow the directions to create your username and password.     Your access code is: " [email or web address]  Expires: 2017  8:41 AM     Your access code will  in 90 days. If you need help or a new code, please call your [facility] clinic or [phone].        Care EveryWhere ID     This is your Care EveryWhere ID. This could be used by other organizations to access your [facility] medical records  [id number]        Your Vitals Were     Pulse Temperature Respirations Pulse Oximetry BMI (Body Mass Index)       65 96.9  F (36.1  C) (Tympanic) 16 99% 24.66 kg/m2        Blood Pressure from Last 3 Encounters:   17 104/64   06/15/17 124/70   17 120/76    Weight from Last 3 Encounters:   17 189 lb 8 oz (86 kg)   06/15/17 185 lb 8 oz (84.1 kg)   17 188 lb (85.3 kg)              We Performed the Following     Albumin Random Urine Quantitative with Creat Ratio     ALT     Basic metabolic panel     CBC with platelets     Hemoglobin A1c     Lipid panel reflex to direct LDL     PAF COMPLETED     Prostate spec antigen screen          Today's Medication Changes          These changes are accurate as of: 17  8:41 AM.  If you have any questions, ask your nurse or doctor.               Start taking these medicines.        Dose/Directions    finasteride 5 MG tablet   Commonly known as:  PROSCAR   Used for:  Benign non-nodular prostatic hyperplasia without lower urinary tract symptoms   Started by:  Darrin Hernandez MD        Dose:  5 mg   Take 1 tablet (5 mg) by mouth daily   Quantity:  90 tablet   Refills:  3       tamsulosin 0.4 MG capsule   Commonly known as:  FLOMAX   Used for:  Benign non-nodular prostatic hyperplasia without lower urinary tract symptoms   Started by:  Darrin Hernandez MD        Dose:  0.4 mg   Take 1 capsule (0.4 mg) by mouth daily   Quantity:  90 capsule   Refills:  3         These medicines have changed or have updated prescriptions.        Dose/Directions    * MICROLIFE WRIST BP MONITOR Monet   This may have changed:  Another medication with the  same name was added. Make sure you understand how and when to take each.   Used for:  Hypertension goal BP (blood pressure) < 130/80   Changed by:  Darrin Hernandez MD        Dose:  1 Device   1 Device 2 times daily   Quantity:  1 Device   Refills:  0       * Wrist Blood Pressure Monitor Misc   This may have changed:  You were already taking a medication with the same name, and this prescription was added. Make sure you understand how and when to take each.   Used for:  Benign essential hypertension   Changed by:  Darrin Hernandez MD        Dose:  1 each   1 each 2 times daily as needed   Quantity:  1 each   Refills:  0       * Notice:  This list has 2 medication(s) that are the same as other medications prescribed for you. Read the directions carefully, and ask your doctor or other care provider to review them with you.         Where to get your medicines      These medications were sent to [facility] [place], [place] - [address] [facility]  1 [facility] [address] [place] [address]     Phone:  [phone]     finasteride 5 MG tablet    glimepiride 1 MG tablet    tamsulosin 0.4 MG capsule         Some of these will need a paper prescription and others can be bought over the counter.  Ask your nurse if you have questions.     Bring a paper prescription for each of these medications     Wrist Blood Pressure Monitor Misc                Primary Care Provider Office Phone # Fax #    Darrin Hernandez [phone] [address] [facility] [address]        Equal Access to Services     NAYELY WEBB AH: Hadii merry alcalao Soreal, waaxda luqadaha, qaybta kaalmada adeegyada, mundo acevedo. So [facility] [phone].    ATENCIÓN: Si habla español, tiene a pizano disposición servicios gratuitos de asistencia lingüística. Llame al [phone].    We comply with applicable federal civil rights laws and [place] laws. We do not discriminate on the  basis of race, color, national origin, age, disability sex, sexual orientation or gender identity.            Thank you!     Thank you for choosing [facility]  for your care. Our goal is always to provide you with excellent care. Hearing back from our patients is one way we can continue to improve our services. Please take a few minutes to complete the written survey that you may receive in the mail after your visit with us. Thank you!             Your Updated Medication List - Protect others around you: Learn how to safely use, store and throw away your medicines at www.disposemymeds.org.          This list is accurate as of: 9/29/17  8:41 AM.  Always use your most recent med list.                   Brand Name Dispense Instructions for use Diagnosis    acetaminophen 500 MG tablet    TYLENOL    100 tablet    Take 1-2 tablets (500-1,000 mg) by mouth every 6 hours as needed    Foot pain, left       aspirin 81 MG chewable tablet     108 tablet    Take 1 tablet (81 mg) by mouth daily    Essential hypertension with goal blood pressure less than 130/80, Hyperlipidemia with target LDL less than 100       atorvastatin 10 MG tablet    LIPITOR    30 tablet    Take 1 tablet (10 mg) by mouth daily    Pure hypercholesterolemia       blood glucose monitoring lancets     1 Box    1 each 2 times daily    Diabetic polyneuropathy associated with diabetes mellitus due to underlying condition (H)       blood glucose monitoring meter device kit     1 kit    Use to test blood sugars TWICE DAILY  times daily or as directed.    Type 2 diabetes mellitus without complication, without long-term current use of insulin (H)       blood glucose monitoring test strip    MIKHAIL CONTOUR NEXT    100 each    1 strip by In Vitro route daily    Diabetic polyneuropathy associated with diabetes mellitus due to underlying condition (H)       cholecalciferol 1000 UNITS Tabs    VITAMIN D-1000 MAX ST    90 tablet    Take 3  tablets by mouth daily    Screening for diabetic peripheral neuropathy, Cramp of both lower extremities       finasteride 5 MG tablet    PROSCAR    90 tablet    Take 1 tablet (5 mg) by mouth daily    Benign non-nodular prostatic hyperplasia without lower urinary tract symptoms       fluticasone 50 MCG/ACT spray    FLONASE    16 g    Spray 1-2 sprays into both nostrils daily    Seasonal allergic rhinitis due to pollen       glimepiride 1 MG tablet    AMARYL    30 tablet    Take 1 tablet (1 mg) by mouth every morning (before breakfast)    Controlled type 2 diabetes mellitus without complication, without long-term current use of insulin (H)       ketotifen 0.025 % Soln ophthalmic solution    ZADITOR    1 Bottle    Place 1 drop into both eyes 2 times daily    Seasonal allergic conjunctivitis       levocetirizine 5 MG tablet    XYZAL    30 tablet    Take 1 tablet (5 mg) by mouth every evening    Seasonal allergic rhinitis due to pollen       lisinopril 2.5 MG tablet    PRINIVIL/Zestril    30 tablet    Take 1 tablet (2.5 mg) by mouth daily    Type 2 diabetes mellitus with stage 1 chronic kidney disease, without long-term current use of insulin (H)       metFORMIN 500 MG 24 hr tablet    GLUCOPHAGE-XR    60 tablet    TAKE 1 TABLET BY MOUTH TWICE DAILY WITH MEALS    Controlled type 2 diabetes mellitus without complication, without long-term current use of insulin (H)       * MICROLIFE WRIST BP MONITOR Monet     1 Device    1 Device 2 times daily    Hypertension goal BP (blood pressure) < 130/80       * Wrist Blood Pressure Monitor Misc     1 each    1 each 2 times daily as needed    Benign essential hypertension       ranitidine 150 MG tablet    ZANTAC    60 tablet    Take 1 tablet (150 mg) by mouth 2 times daily    Gastroesophageal reflux disease without esophagitis       tamsulosin 0.4 MG capsule    FLOMAX    90 capsule    Take 1 capsule (0.4 mg) by mouth daily    Benign non-nodular prostatic hyperplasia without lower  urinary tract symptoms       Trolamine Salicylate 10 % Lotn    ASPERCREME    120 mL    Externally apply topically 4 times daily as needed    Knee bursitis, right       * Notice:  This list has 2 medication(s) that are the same as other medications prescribed for you. Read the directions carefully, and ask your doctor or other care provider to review them with you.

## 2017-09-29 NOTE — PATIENT INSTRUCTIONS
(Z12.5) Special screening for malignant neoplasm of prostate  (primary encounter diagnosis)    Comment:      Plan: Prostate spec antigen screen                   (Z91.81) At risk for falling    Comment:      Plan:          (Z23) Need for prophylactic vaccination and inoculation against influenza    Comment:      Plan:          (Z23) Need for prophylactic vaccination against Streptococcus pneumoniae (pneumococcus)    Comment:      Plan:          (E11.9) Controlled type 2 diabetes mellitus without complication, without long-term current use of insulin (H)    Comment:      Plan: Basic metabolic panel, Lipid panel reflex to           direct LDL, Hemoglobin A1c, Albumin Random           Urine Quantitative with Creat Ratio,           glimepiride (AMARYL) 1 MG tablet                   (N40.0) Benign non-nodular prostatic hyperplasia without lower urinary tract symptoms    Comment:      Plan: tamsulosin (FLOMAX) 0.4 MG capsule, finasteride          (PROSCAR) 5 MG tablet, DISCONTINUED: tamsulosin          (FLOMAX) 0.4 MG capsule, DISCONTINUED:           finasteride (PROSCAR) 5 MG tablet                   (K21.9) Gastroesophageal reflux disease without esophagitis    Comment:  AT RISK FOR ANEMIA    Plan: CBC with platelets                   (E78.5) Hyperlipidemia LDL goal <100    Comment:   FASTING     Plan: Lipid panel reflex to direct LDL, ALT                   (I10) Benign essential hypertension    Comment:      Plan: Blood Pressure Monitoring (WRIST BLOOD PRESSURE          MONITOR) MISC

## 2017-09-29 NOTE — PROGRESS NOTES
SUBJECTIVE:   Chivo Urias is a 71 year old male who presents to clinic today for the following health issues:      Diabetes Follow-up      Patient is checking blood sugars: not at all    Diabetic concerns: None     Symptoms of hypoglycemia (low blood sugar): none     Paresthesias (numbness or burning in feet) or sores: No     Date of last diabetic eye exam: 2 months ago    Hyperlipidemia Follow-Up      Rate your low fat/cholesterol diet?: good    Taking statin?  Yes, possible muscle aches from statin    Other lipid medications/supplements?:  none    Hypertension Follow-up      Outpatient blood pressures are not being checked.    Low Salt Diet: no added salt        Amount of exercise or physical activity: 6-7 days/week for an average of 45-60 minutes. Walking, bicycle    Problems taking medications regularly: No    Medication side effects: none  Diet: low fat/cholesterol  Normal sleeping    .  Current Outpatient Prescriptions   Medication Sig Dispense Refill     glimepiride (AMARYL) 1 MG tablet Take 1 tablet (1 mg) by mouth every morning (before breakfast) 30 tablet 11     tamsulosin (FLOMAX) 0.4 MG capsule Take 1 capsule (0.4 mg) by mouth daily 90 capsule 3     finasteride (PROSCAR) 5 MG tablet Take 1 tablet (5 mg) by mouth daily 90 tablet 3     Blood Pressure Monitoring (WRIST BLOOD PRESSURE MONITOR) MISC 1 each 2 times daily as needed 1 each 0     aspirin 81 MG chewable tablet Take 1 tablet (81 mg) by mouth daily 108 tablet 3     metFORMIN (GLUCOPHAGE-XR) 500 MG 24 hr tablet TAKE 1 TABLET BY MOUTH TWICE DAILY WITH MEALS 60 tablet 11     cholecalciferol (VITAMIN D-1000 MAX ST) 1000 UNITS TABS Take 3 tablets by mouth daily 90 tablet 11     fluticasone (FLONASE) 50 MCG/ACT spray Spray 1-2 sprays into both nostrils daily 16 g 11     levocetirizine (XYZAL) 5 MG tablet Take 1 tablet (5 mg) by mouth every evening 30 tablet 11     atorvastatin (LIPITOR) 10 MG tablet Take 1 tablet (10 mg) by mouth daily 30  tablet 11     ketotifen (ZADITOR) 0.025 % SOLN ophthalmic solution Place 1 drop into both eyes 2 times daily 1 Bottle 11     ranitidine (ZANTAC) 150 MG tablet Take 1 tablet (150 mg) by mouth 2 times daily 60 tablet 11     [DISCONTINUED] glimepiride (AMARYL) 1 MG tablet Take 1 tablet (1 mg) by mouth every morning (before breakfast) 30 tablet 11     blood glucose monitoring (MIKHAIL MICROLET) lancets 1 each 2 times daily (Patient not taking: Reported on 9/29/2017) 1 Box prn     acetaminophen (TYLENOL) 500 MG tablet Take 1-2 tablets (500-1,000 mg) by mouth every 6 hours as needed (Patient not taking: Reported on 9/29/2017) 100 tablet 11     blood glucose monitoring (MIKHAIL CONTOUR NEXT) test strip 1 strip by In Vitro route daily (Patient not taking: Reported on 9/29/2017) 100 each 11     Trolamine Salicylate (ASPERCREME) 10 % LOTN Externally apply topically 4 times daily as needed (Patient not taking: Reported on 6/15/2017) 120 mL 11     blood glucose monitoring (MIKHAIL CONTOUR MONITOR) meter device kit Use to test blood sugars TWICE DAILY  times daily or as directed. (Patient not taking: Reported on 9/29/2017) 1 kit 0     lisinopril (PRINIVIL/ZESTRIL) 2.5 MG tablet Take 1 tablet (2.5 mg) by mouth daily (Patient not taking: Reported on 6/15/2017) 30 tablet 11     Blood Pressure Monitoring (MICROLIFE WRIST BP MONITOR) TATIANA 1 Device 2 times daily (Patient not taking: Reported on 9/29/2017) 1 Device 0        No Known Allergies    Immunization History   Administered Date(s) Administered     Pneumococcal 23 valent 03/05/2013     Tetanus 01/01/2010         reports that he does not drink alcohol.      reports that he does not use illicit drugs.    family history includes Family History Negative in his father and mother.    indicated that his mother is alive. He indicated that his father is alive.      has no past surgical history on file.     reports that he does not engage in sexual activity.  .  Pediatric History   Patient  Guardian Status     Not on file.     Other Topics Concern     Parent/Sibling W/ Cabg, Mi Or Angioplasty Before 65f 55m? No     Social History Narrative    Surgical History  Return To Top     Status Surgery Time Frame Comment Record Date     Inactive  NONE      11/14/2007          --------------------------------------------------------------------------------    Food Allergy  Return To Top     Allergen Reaction Comment Record Date     * No known food allergies      11/14/2007          --------------------------------------------------------------------------------    Drug Allergy  Return To Top     Allergen Reaction Comment Record Date     * No known drug allergies      5/15/2007          --------------------------------------------------------------------------------    Environment Allergy  Return To Top     Allergen Reaction Comment Record Date     * No known environmental allergies      11/14/2007          --------------------------------------------------------------------------------    Social History  Return To Top     Question Answer Comment Record Date     Marital status      11/14/2007      Advance Directive or Living Will  No    5/18/2010      Emotional Abuse  No    7/1/2011      Exercise  Yes SOMETIMES  walks alot.  11/14/2007      Caffeine  Yes  Tea  1/11/2008      Physical Abuse  No    7/1/2011      Sealtbelts  Yes    11/14/2007      Sexual Abuse  No     7/1/2011      Breast/Testicle Self Check  Yes    11/14/2007      Number of children  7  4 GIRLS AND 3 BOYS  7/17/2007      Living arrangements  Apartment/Condo    11/14/2007      Number of children in household  0    11/14/2007      Number of adults in household  1    11/14/2007      Education level  High School Graduate    11/14/2007      Employment  Currently unemployed    11/14/2007      Tobacco history  Has never smoked or chewed tobacco    8/29/2008      Alcohol history  Never drinks alcohol    11/14/2007      Has the patient ever used  illegal drugs?  Has never used illegal drugs    7/1/2011      Has the patient used marijuana?  No    7/1/2011      Has the patient used cocaine?  No    7/1/2011          --------------------------------------------------------------------------------    Medical History Return To Top     Status Diagnosis Time Frame Comment Record Date     Active (729.1) - C - Myalgia      8/11/2011      Active (726.79) - C - Sub-Achilles bursitis      8/11/2011      Active (700) - C - Corn/callus    o  7/1/2011      Active (272.4) - C - Hyperlipidemia      7/1/2011      Active (V81.2) - C - SCREEN-CARDIOVASC NEC      6/21/2011      Active (V82.9) - C - Screening, vitamin d deficiency      4/8/2011      Active (V77.91) - C - Screening, lipids      4/8/2011      Active (V76.51) - C - Screening, colon      4/8/2011      Active (780.79) - C - Fatigue      5/18/2010      Active V76.44 Screening, prostate      10/7/2009      Active (780.79) - C - Fatigue      10/7/2009      Active 477.9 Rhinitis, allergic, cause unspecified      8/29/2008      Active (782.0) - C - Numbness    RIGHT LEG  6/19/2008      Active 780.79 Fatigue      2/20/2008      Active (784.0) - C - Headache, unspec.      2/1/2008      Active 724.3 Sciatica    chronic, with worsening weakness and sensory changes in S1 distribution  1/11/2008      Active 608.4 MALE GEN INFLAM DIS OT      11/20/2007      Active 608.9 MALE GENITAL DIS UNSPEC      11/14/2007      Active V78.3 Screening, HGB      11/14/2007      Active 355.9 MONONEURITIS UNSPEC      7/17/2007      Active 365.9 UNSPECIFIED GLAUCOMA      5/16/2007      Active (719.46) - C - Knee pain      5/16/2007      Active (729.5) - C - limb pain    both legs muscle aches  5/16/2007      Active (724.2) - C - Low back pain      5/16/2007      Inactive  (780.79) - C - Fatigue    IMPROVED   10/22/2009      Inactive  (780.79) - C - Fatigue    IMPROVED  6/19/2008      Inactive  782.0 Numbness      5/16/2007           ----------------------------------------------------        --------------------------------------------------------------------------------    Family History  Return To Top     Status Relationship Disease Comment Record Date     Alive Mother      11/14/2007      Alive Father      11/14/2007          --------------------------------------------------------------------------------                 reports that he has never smoked. He has never used smokeless tobacco.    Medical, social, surgical, and family histories reviewed.    Labs reviewed in EPIC  Patient Active Problem List   Diagnosis     Health Care Home     Myalgia and myositis     Allergic rhinitis     Sciatica     Glaucoma     Foot pain, left     Controlled type 2 diabetes mellitus without complication (H)     Hyperlipidemia with target LDL less than 100     Vitamin D insufficiency     DDD (degenerative disc disease), lumbar     Stage 1 chronic renal impairment associated with type 2 diabetes mellitus (H)     Comprehensive diabetic foot examination, type 2 DM, encounter for (H)     Low HDL (under 40)     Hypertension goal BP (blood pressure) < 130/80     CKD (chronic kidney disease) stage 2, GFR 60-89 ml/min     Right hip pain     Type 2 diabetes, HbA1C goal < 8% (H)     ACP (advance care planning)     Enthesopathy of right hip region     History reviewed. No pertinent surgical history.    Social History   Substance Use Topics     Smoking status: Never Smoker     Smokeless tobacco: Never Used     Alcohol use No     Family History   Problem Relation Age of Onset     Family History Negative Mother      Family History Negative Father          No Known Allergies  Recent Labs   Lab Test  06/08/17   0808  12/08/16   0920  11/10/16   1028  09/15/16   0906  03/14/16   0931   03/04/14   1008   A1C  7.6*   --   6.9*  6.7*  6.9*   < >  6.1*   LDL   --    --   62  69  92   < >  111   HDL   --    --   42  45  46   < >  44   TRIG   --    --   216*  131  190*   < >  137    ALT  23   --   32  29  39   --   28   CR  0.88   --   1.08  1.04  1.10   < >  1.10   GFRESTIMATED  85   --   67  70  66   < >  66   GFRESTBLACK  >90   GFR Calc     --   81  85  80   < >  80   POTASSIUM  4.0   --   4.2  4.2  4.1   < >  4.3   TSH   --   0.99   --    --    --    --   1.02    < > = values in this interval not displayed.        BP Readings from Last 6 Encounters:   09/29/17 104/64   06/15/17 124/70   06/08/17 120/76   12/08/16 124/64   11/10/16 112/64   09/22/16 108/64       Wt Readings from Last 3 Encounters:   09/29/17 189 lb 8 oz (86 kg)   06/15/17 185 lb 8 oz (84.1 kg)   06/08/17 188 lb (85.3 kg)         Positive symptoms or findings indicated by bold designation:     ROS: 10 point ROS neg other than the symptoms noted above in the HPI.except  has Health Care Home; Myalgia and myositis; Allergic rhinitis; Sciatica; Glaucoma; Foot pain, left; Controlled type 2 diabetes mellitus without complication (H); Hyperlipidemia with target LDL less than 100; Vitamin D insufficiency; DDD (degenerative disc disease), lumbar; Stage 1 chronic renal impairment associated with type 2 diabetes mellitus (H); Comprehensive diabetic foot examination, type 2 DM, encounter for (H); Low HDL (under 40); Hypertension goal BP (blood pressure) < 130/80; CKD (chronic kidney disease) stage 2, GFR 60-89 ml/min; Right hip pain; Type 2 diabetes, HbA1C goal < 8% (H); ACP (advance care planning); and Enthesopathy of right hip region on his problem list.   Constitutional: The patient denied fatigue, fever, insomnia, night sweats, recent illness and weight loss.  Weight stable     Eyes: The patient denied blindness, eye pain, eye tearing, photophobia, vision change and visual disturbance. Normal visioin       Ears/Nose/Throat/Neck: The patient denied dizziness, facial pain, hearing loss, nasal discharge, oral pain, otalgia, postnasal drip, sinus congestion, sore throat, tinnitus and voice change.   Normal hearing   "    Cardiovascular: The patient denied arrhythmia, chest pain/pressure, claudication, edema, exercise intolerance, fatigue, orthopnea, palpitations and syncope.  Normal      Respiratory: The patient denied asthma, chest congestion, cough, dyspnea on exertion, dyspnea/shortness of breath, hemoptysis, pedal edema, pleuritic pain, productive sputum, snoring and wheezing. Normal     Gastrointestinal: The patient denied abdominal pain, anorexia, constipation, diarrhea, dysphagia, gastroesophageal reflux, hematochezia, hemorrhoids, melena, nausea and vomiting . GASTROESOPHAGEAL REFLUX DISEASE WITHOUT ESOPHAGITIS     Genitourinary/Nephrology: The patient denied breast complaint, dysuria, nocturia sexual dysfunction, t, urinary frequency, urinary incontinence, urinary urgency    NORMAL     Musculoskeletal: The patient denied arthralgia(s), back pain, joint complaint, muscle weakness, myalgias, osteoporosis, sciatica, stiffness and swelling.  OSTEOARTHRITIS KNEE PAIN     Dermatoligic:: The patient denied acne, dermatitis, ecchymosis, itching, mole change, rash, skin cancer, skin lesion and sores.  NORMAL     Neurologic: The patient denied dizziness, gait abnormality, headache, memory loss, mental status change, paresis, paresthesia, seizure, syncope, tremor and vision change. NORMAL      Psychiatric: The patient denied anxiety, depression, disturbances of memory, drug abuse, insomnia, mood swings and relationship difficulties.  NORMAL      Endocrine: The patient denied , goiter, obesity, polyuria and thyroid disease.  DIABETES 2 GOAL 8%   LDL OR \"BAD\" CHOLESTEROL  GOAL < 100   HYPERTENSION WITH GOAL OF LESS THAN 140/80     Hematologic/Lymphatic: The patient denied abnormal bleeding and bruising, abnormal ecchymoses, anemia, lymph node enlargement/mass, petechiae and venous  Thrombosis.  NORMAL     Allergy/Immunology: The patient denied food allergy and  Allergic rhinitis or conjunctivitis. NORMAL       PE:  /64  Pulse " 65  Temp 96.9  F (36.1  C) (Tympanic)  Resp 16  Wt 189 lb 8 oz (86 kg)  SpO2 99%  BMI 24.66 kg/m2 Body mass index is 24.66 kg/(m^2).    Constitutional: general appearance, well nourished, well developed, in no acute distress, well developed, appears stated age, normal body habitus,  NORMAL     Eyes:; The patient has normal eyelids sclerae and conjunctivae :NL      Ears/Nose/Throat: external ear, overall: normal appearance; external nose, overall: benign appearance, normal moujth gums and lips  The patient has: NORMAL     Neck: thyroid, overall: normal size, normal consistency, nontender,  NOL     Respiratory:  palpation of chest, overall: normal excursion, NORMAL   Clear to percussion and auscultation  NORMAL     Tachypnea  NORMAL  Color  NORMAL     Cardiovascular:  Good color with no peripheral edema NORMAL   Regular sinus rhythm without murmur. Physiologic heart sounds Heart is unelarged  .   Chest/Breast: normal shape  NORMAL      Abdominal exam,  Liver and spleen are  unenlarged NORMAL       Tenderness  NOL   Scars  NOL     Urogenital; no renal, flank or bladder  tenderness; NORMAL     Lymphatic: neck nodes, N left    Other nodes   WITHIN NORMAL LIMITS      Musculoskeletal:  Brief ortho exam normal except:  NORMAL      Integument: inspection of skin, no rash, lesions; and, palpation, no induration, no tenderness.  NORMAL       Neurologic mental status, overall: alert and oriented; gait, no ataxia, no unsteadiness; coordination, no tremors; cranial nerves, overall: normal motor, overall: normal bulk, tone. NORMAL;      Psychiatric: orientation/consciousness, overall: oriented to person, place and time; behavior/psychomotor activity, no tics, normal psychomotor activity; mood and affect, overall: normal mood and affect; appearance, overall: well-groomed, good eye contact; speech, overall: normal quality, no aphasia and normal quality, quantity, intact. NORMAL   NORMAL CIRCULATION MOTION AND SENSATION OF FEET  NORMAL TESTING    Diagnostic Test Results:  Results for orders placed or performed in visit on 06/12/17   Fecal colorectal cancer screen (FIT)   Result Value Ref Range    Occult Blood Scn FIT Negative NEG         ICD-10-CM    1. Special screening for malignant neoplasm of prostate Z12.5 Prostate spec antigen screen   2. Controlled type 2 diabetes mellitus without complication, without long-term current use of insulin (H) E11.9 Basic metabolic panel     Lipid panel reflex to direct LDL     Hemoglobin A1c     Albumin Random Urine Quantitative with Creat Ratio     glimepiride (AMARYL) 1 MG tablet   3. At risk for falling Z91.81    4. Need for prophylactic vaccination and inoculation against influenza Z23    5. Need for prophylactic vaccination against Streptococcus pneumoniae (pneumococcus) Z23    6. Benign non-nodular prostatic hyperplasia without lower urinary tract symptoms N40.0 tamsulosin (FLOMAX) 0.4 MG capsule     finasteride (PROSCAR) 5 MG tablet     DISCONTINUED: tamsulosin (FLOMAX) 0.4 MG capsule     DISCONTINUED: finasteride (PROSCAR) 5 MG tablet   7. Gastroesophageal reflux disease without esophagitis K21.9 CBC with platelets   8. Hyperlipidemia LDL goal <100 E78.5 Lipid panel reflex to direct LDL     ALT   9. Benign essential hypertension I10 Blood Pressure Monitoring (WRIST BLOOD PRESSURE MONITOR) MISC        .    Side effects benefits and risks thoroughly discussed. .he may come in early if unimproved or getting worse          Importance of adhering to regimen discussed and if medications were dispensed, the importance of taking medications discussed and bringing in the medications after every visit for chronic problems         Please drink 2 glasses of water prior to meals and walk 15-30 minutes after meals    I spent  25 MINUTES SPENT  with patient discussing the following issues   The primary encounter diagnosis was Special screening for malignant neoplasm of prostate. Diagnoses of Controlled type 2  diabetes mellitus without complication, without long-term current use of insulin (H), At risk for falling, Need for prophylactic vaccination and inoculation against influenza, Need for prophylactic vaccination against Streptococcus pneumoniae (pneumococcus), Benign non-nodular prostatic hyperplasia without lower urinary tract symptoms, Gastroesophageal reflux disease without esophagitis, Hyperlipidemia LDL goal <100, and Benign essential hypertension were also pertinent to this visit. over half of which involved counseling and coordination of care.    Patient Instructions     (Z12.5) Special screening for malignant neoplasm of prostate  (primary encounter diagnosis)    Comment:      Plan: Prostate spec antigen screen                   (Z91.81) At risk for falling    Comment:      Plan:          (Z23) Need for prophylactic vaccination and inoculation against influenza    Comment:      Plan:          (Z23) Need for prophylactic vaccination against Streptococcus pneumoniae (pneumococcus)    Comment:      Plan:          (E11.9) Controlled type 2 diabetes mellitus without complication, without long-term current use of insulin (H)    Comment:      Plan: Basic metabolic panel, Lipid panel reflex to           direct LDL, Hemoglobin A1c, Albumin Random           Urine Quantitative with Creat Ratio,           glimepiride (AMARYL) 1 MG tablet                   (N40.0) Benign non-nodular prostatic hyperplasia without lower urinary tract symptoms    Comment:      Plan: tamsulosin (FLOMAX) 0.4 MG capsule, finasteride          (PROSCAR) 5 MG tablet, DISCONTINUED: tamsulosin          (FLOMAX) 0.4 MG capsule, DISCONTINUED:           finasteride (PROSCAR) 5 MG tablet                   (K21.9) Gastroesophageal reflux disease without esophagitis    Comment:  AT RISK FOR ANEMIA    Plan: CBC with platelets                   (E78.5) Hyperlipidemia LDL goal <100    Comment:   FASTING     Plan: Lipid panel reflex to direct LDL, ALT                    (I10) Benign essential hypertension    Comment:      Plan: Blood Pressure Monitoring (WRIST BLOOD PRESSURE          MONITOR) MISC                            ALL THE ABOVE PROBLEMS ARE STABLE AND MED CHANGES AS NOTED    Diet:  MEDITERRANEAN DIET AND DIABETES     Exercise:  AEROBIC AND FIT BIT TWITCH RECOMMENDED    Exercises Range of motion, balance, isometric, and strengthening exercises 30 repetitions twice daily of involved joints      .EDUARDA LAMBERT MD 9/29/2017 8:36 AM  September 29, 2017

## 2017-09-29 NOTE — NURSING NOTE
"Chief Complaint   Patient presents with     Diabetes     Hypertension     Lipids       Initial /64  Pulse 65  Temp 96.9  F (36.1  C) (Tympanic)  Resp 16  Wt 189 lb 8 oz (86 kg)  SpO2 99%  BMI 24.66 kg/m2 Estimated body mass index is 24.66 kg/(m^2) as calculated from the following:    Height as of 6/8/17: 6' 1.5\" (1.867 m).    Weight as of this encounter: 189 lb 8 oz (86 kg).  Medication Reconciliation: complete   Jazlyn Lazar CMA    "

## 2017-10-01 NOTE — PROGRESS NOTES
"NORMAL DIABETES URINE PROTEIN TEST   NORMAL PSA OR PROSTATE CANCER SCREENING TEST,     NORMAL COMPLETE BLOOD PANEL WBCS RBCS AND PLATELETS   BORDERLINE  GLUCOSE IF FASTING   NORMAL BLOOD SALTS AND RENAL FUNCTION  NORMAL TOTAL CHOLESTEROL   HIGH TRIGLYCERIDES   LIMIT ALCOHOL AND SIMPLE SUGARS      What You Can Do to Reduce Cholesterol Levels  and Reduce Risk of Diabetes, Heart, and Blood Vessel Disease  1. Eat six to eight servings of green or root vegetables or fruit (raw or cooked) per day. You need 35 grams of fiber per day.  Examples: carrots apples  broccoli oranges  spinach grapefruit  lettuce pears  bananas  2. Use up to 2 cup of walnuts, almonds, or pecans as a source of \"good\" fat per day.  3. Drink one to three servings of soy milk (great for cereal) or 2 cup of soynuts per day.  4. Use margarine with reduced trans or saturated fats (e.g. Benecol, Smart Balance, olive oil margarine) as replacement for butter or margarine.  5. Eat fewer or half-portions of desserts, baked goods, chips, cookies, processed flour and sugar, pasta, butter, cream, non-skim dairy, ice cream.  6. Use olive or canola oil as the only oils for cooking and dressings.  7. Use one tablespoon of ground flaxseed or Natural Ovens \"Energy Mix\" per day (great on cereal or over salad).  8. Eat one to two servings per week of wild salmon or water-packed tuna.  9. Limit beef, lamb, and pork to at most one serving per day. (Range-fed beef, if you can get it, is much preferred and allows for greater use in diet).  10. Eat three to four servings per day of whole grains (legumes, rice, wheat, oats).  11. Limit sodas and beer at most to three per week (only special occasions).  12. 65 percent of us need to lose weight and all of us need to keep moving! You should be walking at least 150 minutes per week. You should lose approximately seven percent of your weight in the next year if overweight. Check with me for more details.  1. Andrew Weil's " "paperback book, The Healthy Kitchen, is a great addition to your healthy lifestyle library.  2. American Diabetic Association (www.diabetes.org)   a wealth of information on nutritional excellence.  3. Other great websites for Mediterranean diets are available.   NORMAL LDL OR \"BAD\" CHOLESTEROL   NORMAL VERY LOW DENSITY CHOLESTEROL   NORMAL HDL OR \"GOOD\" CHOLESTEROL   NORMAL LIVER FUNCTION TEST   NORMAL WELL CONTROLLED THREE MONTH GLUCOSE AVERAGE AT 7.1 MG%  NORMAL COMPLETE BLOOD PANEL WBCS RBCS AND PLATELETS   EDUARDA LAMBERT JR., MD"

## 2017-10-02 ENCOUNTER — APPOINTMENT (OUTPATIENT)
Dept: LAB | Facility: CLINIC | Age: 72
End: 2017-10-02
Payer: COMMERCIAL

## 2017-10-02 LAB — HEMOCCULT STL QL IA: NEGATIVE

## 2017-10-02 NOTE — LETTER
October 4, 2017      Chivo Urias  [address]  [address]  [facility] [id number]        Dear ,    We are writing to inform you of your test results.    NORMAL FECAL COLORECTAL CANCER SCREEN     Resulted Orders   Fecal colorectal cancer screen FIT   Result Value Ref Range    Occult Blood Scn FIT Negative NEG^Negative       If you have any questions or concerns, please call the clinic at the number listed above.       Sincerely,        EDUARDA LAMBERT MD

## 2017-10-04 NOTE — PROGRESS NOTES
Please send normal lab letter when labs are complete  NORMAL FECAL COLORECTAL CANCER SCREEN   EDUARDA LAMBERT JR., MD

## 2017-10-30 ENCOUNTER — CARE COORDINATION (OUTPATIENT)
Dept: GERIATRIC MEDICINE | Facility: CLINIC | Age: 72
End: 2017-10-30

## 2017-10-30 NOTE — PROGRESS NOTES
[facility] Six-Month Telephone Assessment    6 month telephone assessment completed on 10/30/17    ER visits: No  Hospitalizations: No  TCU stays: No  Significant health status changes: No  Falls/Injuries: No  ADL/IADL changes: No  Changes in services: No    Caregiver Assessment follow up:  N/A    Goals: See POC in chart for goal progress documentation.      Will see client in 6 months for an annual health risk assessment.   Encouraged client to call CM with any questions or concerns in the meantime.     Laura Romo RN BSN PHN  [facility]   [phone]  Fax: [phone]

## 2017-11-13 ENCOUNTER — TRANSFERRED RECORDS (OUTPATIENT)
Dept: HEALTH INFORMATION MANAGEMENT | Facility: CLINIC | Age: 72
End: 2017-11-13

## 2017-12-08 ENCOUNTER — OFFICE VISIT (OUTPATIENT)
Dept: FAMILY MEDICINE | Facility: CLINIC | Age: 72
End: 2017-12-08
Payer: COMMERCIAL

## 2017-12-08 VITALS
RESPIRATION RATE: 16 BRPM | HEART RATE: 71 BPM | SYSTOLIC BLOOD PRESSURE: 138 MMHG | BODY MASS INDEX: 24.99 KG/M2 | DIASTOLIC BLOOD PRESSURE: 72 MMHG | WEIGHT: 192 LBS | OXYGEN SATURATION: 97 % | TEMPERATURE: 97.6 F

## 2017-12-08 DIAGNOSIS — E11.9 CONTROLLED TYPE 2 DIABETES MELLITUS WITHOUT COMPLICATION, WITHOUT LONG-TERM CURRENT USE OF INSULIN (H): Chronic | ICD-10-CM

## 2017-12-08 DIAGNOSIS — M76.891 ENTHESOPATHY OF RIGHT HIP REGION: ICD-10-CM

## 2017-12-08 DIAGNOSIS — N18.1 TYPE 2 DIABETES MELLITUS WITH STAGE 1 CHRONIC KIDNEY DISEASE, WITHOUT LONG-TERM CURRENT USE OF INSULIN (H): Chronic | ICD-10-CM

## 2017-12-08 DIAGNOSIS — E11.22 TYPE 2 DIABETES MELLITUS WITH STAGE 1 CHRONIC KIDNEY DISEASE, WITHOUT LONG-TERM CURRENT USE OF INSULIN (H): Chronic | ICD-10-CM

## 2017-12-08 DIAGNOSIS — E78.5 HYPERLIPIDEMIA WITH TARGET LDL LESS THAN 100: Chronic | ICD-10-CM

## 2017-12-08 DIAGNOSIS — M79.10 MUSCLE PAIN: Primary | ICD-10-CM

## 2017-12-08 DIAGNOSIS — I10 HYPERTENSION GOAL BP (BLOOD PRESSURE) < 130/80: Chronic | ICD-10-CM

## 2017-12-08 PROCEDURE — 99214 OFFICE O/P EST MOD 30 MIN: CPT | Performed by: FAMILY MEDICINE

## 2017-12-08 RX ORDER — BACLOFEN 10 MG/1
TABLET ORAL
Qty: 90 TABLET | Refills: 0 | Status: SHIPPED | OUTPATIENT
Start: 2017-12-08 | End: 2018-07-23

## 2017-12-08 NOTE — PROGRESS NOTES
SUBJECTIVE:   Chivo Urias is a 72 year old male who presents to clinic today for the following health issues:      Musculoskeletal problem/pain      Duration: 3 months    Description  Location: right knee    Intensity:  7/10    Accompanying signs and symptoms: pain down into calf muscle    History  Previous similar problem: no   Previous evaluation:  none    Precipitating or alleviating factors:  Trauma or overuse: no   Aggravating factors include: walking    Therapies tried and outcome: nothing        Diabetes Follow-up    Patient is checking blood sugars: once daily.  Results are as follows:       am - 120-140        Diabetic concerns: None     Symptoms of hypoglycemia (low blood sugar): none     Paresthesias (numbness or burning in feet) or sores: No     Date of last diabetic eye exam: 3 months ago  BP Readings from Last 2 Encounters:   09/29/17 104/64   06/15/17 124/70     Hemoglobin A1C (%)   Date Value   09/29/2017 7.1 (H)   06/08/2017 7.6 (H)     LDL Cholesterol Calculated (mg/dL)   Date Value   09/29/2017 39   11/10/2016 62           Problem list and histories reviewed & adjusted, as indicated.  Additional history: as documented      Patient Active Problem List   Diagnosis     Health Care Home     Myalgia and myositis     Allergic rhinitis     Sciatica     Glaucoma     Foot pain, left     Controlled type 2 diabetes mellitus without complication (H)     Hyperlipidemia with target LDL less than 100     Vitamin D insufficiency     DDD (degenerative disc disease), lumbar     Stage 1 chronic renal impairment associated with type 2 diabetes mellitus (H)     Comprehensive diabetic foot examination, type 2 DM, encounter for (H)     Low HDL (under 40)     Hypertension goal BP (blood pressure) < 130/80     CKD (chronic kidney disease) stage 2, GFR 60-89 ml/min     Right hip pain     Type 2 diabetes, HbA1C goal < 8% (H)     ACP (advance care planning)     Enthesopathy of right hip region     History  reviewed. No pertinent surgical history.    Social History   Substance Use Topics     Smoking status: Never Smoker     Smokeless tobacco: Never Used     Alcohol use No     Family History   Problem Relation Age of Onset     Family History Negative Mother      Family History Negative Father          Current Outpatient Prescriptions   Medication Sig Dispense Refill     baclofen (LIORESAL) 10 MG tablet Take 1/2 tab (5 mg) three times daily  As  Needed for pain 90 tablet 0     tamsulosin (FLOMAX) 0.4 MG capsule Take 1 capsule (0.4 mg) by mouth daily 90 capsule 3     finasteride (PROSCAR) 5 MG tablet Take 1 tablet (5 mg) by mouth daily 90 tablet 3     Blood Pressure Monitoring (WRIST BLOOD PRESSURE MONITOR) MISC 1 each 2 times daily as needed 1 each 0     aspirin 81 MG chewable tablet Take 1 tablet (81 mg) by mouth daily 108 tablet 3     blood glucose monitoring (MIKHAIL MICROLET) lancets 1 each 2 times daily 1 Box prn     metFORMIN (GLUCOPHAGE-XR) 500 MG 24 hr tablet TAKE 1 TABLET BY MOUTH TWICE DAILY WITH MEALS 60 tablet 11     cholecalciferol (VITAMIN D-1000 MAX ST) 1000 UNITS TABS Take 3 tablets by mouth daily 90 tablet 11     fluticasone (FLONASE) 50 MCG/ACT spray Spray 1-2 sprays into both nostrils daily 16 g 11     levocetirizine (XYZAL) 5 MG tablet Take 1 tablet (5 mg) by mouth every evening 30 tablet 11     atorvastatin (LIPITOR) 10 MG tablet Take 1 tablet (10 mg) by mouth daily 30 tablet 11     blood glucose monitoring ([employer] CONTOUR NEXT) test strip 1 strip by In Vitro route daily 100 each 11     ketotifen (ZADITOR) 0.025 % SOLN ophthalmic solution Place 1 drop into both eyes 2 times daily 1 Bottle 11     Trolamine Salicylate (ASPERCREME) 10 % LOTN Externally apply topically 4 times daily as needed 120 mL 11     blood glucose monitoring ([employer] CONTOUR MONITOR) meter device kit Use to test blood sugars TWICE DAILY  times daily or as directed. 1 kit 0     ranitidine (ZANTAC) 150 MG tablet Take 1 tablet (150 mg) by  mouth 2 times daily 60 tablet 11     glimepiride (AMARYL) 1 MG tablet Take 1 tablet (1 mg) by mouth every morning (before breakfast) (Patient not taking: Reported on 12/8/2017) 30 tablet 11     acetaminophen (TYLENOL) 500 MG tablet Take 1-2 tablets (500-1,000 mg) by mouth every 6 hours as needed (Patient not taking: Reported on 9/29/2017) 100 tablet 11     lisinopril (PRINIVIL/ZESTRIL) 2.5 MG tablet Take 1 tablet (2.5 mg) by mouth daily (Patient not taking: Reported on 6/15/2017) 30 tablet 11     No Known Allergies  Recent Labs   Lab Test  09/29/17   0832  06/08/17   0808  12/08/16   0920  11/10/16   1028  09/15/16   0906   03/04/14   1008   A1C  7.1*  7.6*   --   6.9*  6.7*   < >  6.1*   LDL  39   --    --   62  69   < >  111   HDL  40   --    --   42  45   < >  44   TRIG  306*   --    --   216*  131   < >  137   ALT  24  23   --   32  29   < >  28   CR  0.90  0.88   --   1.08  1.04   < >  1.10   GFRESTIMATED  83  85   --   67  70   < >  66   GFRESTBLACK  >90  >90  African American GFR Calc     --   81  85   < >  80   POTASSIUM  4.2  4.0   --   4.2  4.2   < >  4.3   TSH   --    --   0.99   --    --    --   1.02    < > = values in this interval not displayed.      BP Readings from Last 3 Encounters:   12/08/17 138/72   09/29/17 104/64   06/15/17 124/70    Wt Readings from Last 3 Encounters:   12/08/17 192 lb (87.1 kg)   09/29/17 189 lb 8 oz (86 kg)   06/15/17 185 lb 8 oz (84.1 kg)                  Labs reviewed in EPIC          Reviewed and updated as needed this visit by clinical staff     Reviewed and updated as needed this visit by Provider         ROS: has Health Care Home; Myalgia and myositis; Allergic rhinitis; Sciatica; Glaucoma; Foot pain, left; Controlled type 2 diabetes mellitus without complication (H); Hyperlipidemia with target LDL less than 100; Vitamin D insufficiency; DDD (degenerative disc disease), lumbar; Stage 1 chronic renal impairment associated with type 2 diabetes mellitus (H); Comprehensive  diabetic foot examination, type 2 DM, encounter for (H); Low HDL (under 40); Hypertension goal BP (blood pressure) < 130/80; CKD (chronic kidney disease) stage 2, GFR 60-89 ml/min; Right hip pain; Type 2 diabetes, HbA1C goal < 8% (H); ACP (advance care planning); and Enthesopathy of right hip region on his problem list.    Review Of Systems  Skin: negative  Eyes: negative  Ears/Nose/Throat: negative  Respiratory: No shortness of breath, dyspnea on exertion, cough, or hemoptysis  Cardiovascular: negative  Gastrointestinal: heartburn  Genitourinary:  CHRONIC KIDNEY DISEASE MILD   Musculoskeletal: back pain RIGHT HIP   Neurologic: negative  Psychiatric: negative  Hematologic/Lymphatic/Immunologic: negative  Endocrine: diabetes      OBJECTIVE:     /72  Pulse 71  Temp 97.6  F (36.4  C) (Tympanic)  Resp 16  Wt 192 lb (87.1 kg)  SpO2 97%  BMI 24.99 kg/m2  Body mass index is 24.99 kg/(m^2).  GENERAL: healthy, alert and no distress  EYES: Eyes grossly normal to inspection, PERRL and conjunctivae and sclerae normal  HENT: ear canals and TM's normal, nose and mouth without ulcers or lesions  NECK: no adenopathy, no asymmetry, masses, or scars and thyroid normal to palpation  RESP: lungs clear to auscultation - no rales, rhonchi or wheezes  CV: regular rate and rhythm, normal S1 S2, no S3 or S4, no murmur, click or rub, no peripheral edema and peripheral pulses strong  ABDOMEN: soft, nontender, no hepatosplenomegaly, no masses and bowel sounds normal  MS: no gross musculoskeletal defects noted, no edema  RIGHT SIDED LEG PAIN   DECREASE RANGE OF MOTION OF BACK  SKIN: no suspicious lesions or rashes  NEURO: Normal strength and tone, mentation intact and speech normal  PSYCH: mentation appears normal, affect normal/bright  LYMPH: no cervical, supraclavicular, axillary, or inguinal adenopathy  Diabetic foot exam: normal DP and PT pulses, no trophic changes or ulcerative lesions, normal sensory exam and normal  monofilament exam    Diagnostic Test Results:  Results for orders placed or performed in visit on 10/02/17   Fecal colorectal cancer screen FIT   Result Value Ref Range    Occult Blood Scn FIT Negative NEG^Negative       ASSESSMENT/PLAN:           ICD-10-CM    1. Muscle pain M79.1 baclofen (LIORESAL) 10 MG tablet       Patient Instructions     (M79.1) Muscle pain  (primary encounter diagnosis)  Comment:    Plan: baclofen (LIORESAL) 10 MG tablet         1/2 one tablet three times daily   Patient Active Problem List   Diagnosis     Health Care Home     Myalgia and myositis     Allergic rhinitis     Sciatica     Glaucoma     Foot pain, left     Controlled type 2 diabetes mellitus without complication (H)     Hyperlipidemia with target LDL less than 100     Vitamin D insufficiency     DDD (degenerative disc disease), lumbar     Stage 1 chronic renal impairment associated with type 2 diabetes mellitus (H)     Comprehensive diabetic foot examination, type 2 DM, encounter for (H)     Low HDL (under 40)     Hypertension goal BP (blood pressure) < 130/80     CKD (chronic kidney disease) stage 2, GFR 60-89 ml/min     Right hip pain     Type 2 diabetes, HbA1C goal < 8% (H)     ACP (advance care planning)     Enthesopathy of right hip region       `   Current Outpatient Prescriptions   Medication     baclofen (LIORESAL) 10 MG tablet     tamsulosin (FLOMAX) 0.4 MG capsule     finasteride (PROSCAR) 5 MG tablet     Blood Pressure Monitoring (WRIST BLOOD PRESSURE MONITOR) MISC     aspirin 81 MG chewable tablet     blood glucose monitoring (MIKHAIL MICROLET) lancets     metFORMIN (GLUCOPHAGE-XR) 500 MG 24 hr tablet     cholecalciferol (VITAMIN D-1000 MAX ST) 1000 UNITS TABS     fluticasone (FLONASE) 50 MCG/ACT spray     levocetirizine (XYZAL) 5 MG tablet     atorvastatin (LIPITOR) 10 MG tablet     blood glucose monitoring (MIKHAIL CONTOUR NEXT) test strip     ketotifen (ZADITOR) 0.025 % SOLN ophthalmic solution     Trolamine Salicylate  (ASPERCREME) 10 % LOTN     blood glucose monitoring (MIKHAIL CONTOUR MONITOR) meter device kit     ranitidine (ZANTAC) 150 MG tablet     glimepiride (AMARYL) 1 MG tablet     acetaminophen (TYLENOL) 500 MG tablet     lisinopril (PRINIVIL/ZESTRIL) 2.5 MG tablet     No current facility-administered medications for this visit.            EDUARDA LAMBERT MD  [facility]

## 2017-12-08 NOTE — MR AVS SNAPSHOT
After Visit Summary   12/8/2017    Chivo Urias    MRN: [id number]           Patient Information     Date Of Birth          1945        Visit Information        Provider Department      12/8/2017 8:30 AM Darrin Hernandez MD; [employer] [facility]        Today's Diagnoses     Muscle pain    -  1      Care Instructions    (M79.1) Muscle pain  (primary encounter diagnosis)  Comment:    Plan: baclofen (LIORESAL) 10 MG tablet         1/2 one tablet three times daily   Patient Active Problem List   Diagnosis     Health Care Home     Myalgia and myositis     Allergic rhinitis     Sciatica     Glaucoma     Foot pain, left     Controlled type 2 diabetes mellitus without complication (H)     Hyperlipidemia with target LDL less than 100     Vitamin D insufficiency     DDD (degenerative disc disease), lumbar     Stage 1 chronic renal impairment associated with type 2 diabetes mellitus (H)     Comprehensive diabetic foot examination, type 2 DM, encounter for (H)     Low HDL (under 40)     Hypertension goal BP (blood pressure) < 130/80     CKD (chronic kidney disease) stage 2, GFR 60-89 ml/min     Right hip pain     Type 2 diabetes, HbA1C goal < 8% (H)     ACP (advance care planning)     Enthesopathy of right hip region       `   Current Outpatient Prescriptions   Medication     baclofen (LIORESAL) 10 MG tablet     tamsulosin (FLOMAX) 0.4 MG capsule     finasteride (PROSCAR) 5 MG tablet     Blood Pressure Monitoring (WRIST BLOOD PRESSURE MONITOR) MISC     aspirin 81 MG chewable tablet     blood glucose monitoring (MIKHAIL MICROLET) lancets     metFORMIN (GLUCOPHAGE-XR) 500 MG 24 hr tablet     cholecalciferol (VITAMIN D-1000 MAX ST) 1000 UNITS TABS     fluticasone (FLONASE) 50 MCG/ACT spray     levocetirizine (XYZAL) 5 MG tablet     atorvastatin (LIPITOR) 10 MG tablet     blood glucose monitoring (MIKHAIL CONTOUR NEXT) test strip     ketotifen  "(ZADITOR) 0.025 % SOLN ophthalmic solution     Trolamine Salicylate (ASPERCREME) 10 % LOTN     blood glucose monitoring (MIKHAIL CONTOUR MONITOR) meter device kit     ranitidine (ZANTAC) 150 MG tablet     glimepiride (AMARYL) 1 MG tablet     acetaminophen (TYLENOL) 500 MG tablet     lisinopril (PRINIVIL/ZESTRIL) 2.5 MG tablet     No current facility-administered medications for this visit.                Follow-ups after your visit        Who to contact     If you have questions or need follow up information about today's clinic visit or your schedule please contact [facility] directly at [phone].  Normal or non-critical lab and imaging results will be communicated to you by [employer], letter or phone within 4 business days after the clinic has received the results. If you do not hear from us within 7 days, please contact the clinic through [employer] or phone. If you have a critical or abnormal lab result, we will notify you by phone as soon as possible.  Submit refill requests through [employer] or call your pharmacy and they will forward the refill request to us. Please allow 3 business days for your refill to be completed.          Additional Information About Your Visit        MyChart Information     [employer] lets you send messages to your doctor, view your test results, renew your prescriptions, schedule appointments and more. To sign up, go to [email or web address] . Click on \"Log in\" on the left side of the screen, which will take you to the Welcome page. Then click on \"Sign up Now\" on the right side of the page.     You will be asked to enter the access code listed below, as well as some personal information. Please follow the directions to create your username and password.     Your access code is: [email or web address]  Expires: 2017  7:41 AM     Your access code will  in 90 days. If you need help or a new code, please call your [facility] or [phone].      "   Care EveryWhere ID     This is your Care EveryWhere ID. This could be used by other organizations to access your [facility] medical records  [id number]        Your Vitals Were     Pulse Temperature Respirations Pulse Oximetry BMI (Body Mass Index)       71 97.6  F (36.4  C) (Tympanic) 16 97% 24.99 kg/m2        Blood Pressure from Last 3 Encounters:   12/08/17 138/72   09/29/17 104/64   06/15/17 124/70    Weight from Last 3 Encounters:   12/08/17 192 lb (87.1 kg)   09/29/17 189 lb 8 oz (86 kg)   06/15/17 185 lb 8 oz (84.1 kg)              Today, you had the following     No orders found for display         Today's Medication Changes          These changes are accurate as of: 12/8/17  9:09 AM.  If you have any questions, ask your nurse or doctor.               Start taking these medicines.        Dose/Directions    baclofen 10 MG tablet   Commonly known as:  LIORESAL   Used for:  Muscle pain   Started by:  Darrin Hernandez MD        Take 1/2 tab (5 mg) three times daily  As  Needed for pain   Quantity:  90 tablet   Refills:  0            Where to get your medicines      These medications were sent to [facility] - [place], [place] - [address] [place] [address]  [facility] [address] [place] [address]     Phone:  [phone]     baclofen 10 MG tablet                Primary Care Provider Office Phone # Fax #    Darrin Hernandez [phone] [address] [place] [id number]        Equal Access to Services     KURTIS WEBB AH: Hadii merry ku hadasho Soreal, waaxda luqadaha, qaybta kaalmada adeegyada, mundo acevedo. So [facility] [phone].    ATENCIÓN: Si habla español, tiene a pizano disposición servicios gratuitos de asistencia lingüística. Llame al [phone].    We comply with applicable federal civil rights laws and [place] laws. We do not discriminate on the basis of race, color, national origin, age, disability, sex, sexual orientation, or  gender identity.            Thank you!     Thank you for choosing [facility]  for your care. Our goal is always to provide you with excellent care. Hearing back from our patients is one way we can continue to improve our services. Please take a few minutes to complete the written survey that you may receive in the mail after your visit with us. Thank you!             Your Updated Medication List - Protect others around you: Learn how to safely use, store and throw away your medicines at www.disposemymeds.org.          This list is accurate as of: 12/8/17  9:09 AM.  Always use your most recent med list.                   Brand Name Dispense Instructions for use Diagnosis    acetaminophen 500 MG tablet    TYLENOL    100 tablet    Take 1-2 tablets (500-1,000 mg) by mouth every 6 hours as needed    Foot pain, left       aspirin 81 MG chewable tablet     108 tablet    Take 1 tablet (81 mg) by mouth daily    Essential hypertension with goal blood pressure less than 130/80, Hyperlipidemia with target LDL less than 100       atorvastatin 10 MG tablet    LIPITOR    30 tablet    Take 1 tablet (10 mg) by mouth daily    Pure hypercholesterolemia       baclofen 10 MG tablet    LIORESAL    90 tablet    Take 1/2 tab (5 mg) three times daily  As  Needed for pain    Muscle pain       blood glucose monitoring lancets     1 Box    1 each 2 times daily    Diabetic polyneuropathy associated with diabetes mellitus due to underlying condition (H)       blood glucose monitoring meter device kit     1 kit    Use to test blood sugars TWICE DAILY  times daily or as directed.    Type 2 diabetes mellitus without complication, without long-term current use of insulin (H)       blood glucose monitoring test strip    MIKHAIL CONTOUR NEXT    100 each    1 strip by In Vitro route daily    Diabetic polyneuropathy associated with diabetes mellitus due to underlying condition (H)       cholecalciferol 1000 UNITS Tabs     VITAMIN D-1000 MAX ST    90 tablet    Take 3 tablets by mouth daily    Screening for diabetic peripheral neuropathy, Cramp of both lower extremities       finasteride 5 MG tablet    PROSCAR    90 tablet    Take 1 tablet (5 mg) by mouth daily    Benign non-nodular prostatic hyperplasia without lower urinary tract symptoms       fluticasone 50 MCG/ACT spray    FLONASE    16 g    Spray 1-2 sprays into both nostrils daily    Seasonal allergic rhinitis due to pollen       glimepiride 1 MG tablet    AMARYL    30 tablet    Take 1 tablet (1 mg) by mouth every morning (before breakfast)    Controlled type 2 diabetes mellitus without complication, without long-term current use of insulin (H)       ketotifen 0.025 % Soln ophthalmic solution    ZADITOR    1 Bottle    Place 1 drop into both eyes 2 times daily    Seasonal allergic conjunctivitis       levocetirizine 5 MG tablet    XYZAL    30 tablet    Take 1 tablet (5 mg) by mouth every evening    Seasonal allergic rhinitis due to pollen       lisinopril 2.5 MG tablet    PRINIVIL/Zestril    30 tablet    Take 1 tablet (2.5 mg) by mouth daily    Type 2 diabetes mellitus with stage 1 chronic kidney disease, without long-term current use of insulin (H)       metFORMIN 500 MG 24 hr tablet    GLUCOPHAGE-XR    60 tablet    TAKE 1 TABLET BY MOUTH TWICE DAILY WITH MEALS    Controlled type 2 diabetes mellitus without complication, without long-term current use of insulin (H)       ranitidine 150 MG tablet    ZANTAC    60 tablet    Take 1 tablet (150 mg) by mouth 2 times daily    Gastroesophageal reflux disease without esophagitis       tamsulosin 0.4 MG capsule    FLOMAX    90 capsule    Take 1 capsule (0.4 mg) by mouth daily    Benign non-nodular prostatic hyperplasia without lower urinary tract symptoms       Trolamine Salicylate 10 % Lotn    ASPERCREME    120 mL    Externally apply topically 4 times daily as needed    Knee bursitis, right       Wrist Blood Pressure Monitor Misc     1  each    1 each 2 times daily as needed    Benign essential hypertension

## 2017-12-08 NOTE — NURSING NOTE
"Chief Complaint   Patient presents with     Musculoskeletal Problem     Diabetes     Forms      Special Diet Infomation Request       Initial /72  Pulse 71  Temp 97.6  F (36.4  C) (Tympanic)  Resp 16  Wt 192 lb (87.1 kg)  SpO2 97%  BMI 24.99 kg/m2 Estimated body mass index is 24.99 kg/(m^2) as calculated from the following:    Height as of 6/8/17: 6' 1.5\" (1.867 m).    Weight as of this encounter: 192 lb (87.1 kg).  Medication Reconciliation: complete   Jazlyn Lazar CMA      "

## 2017-12-08 NOTE — PATIENT INSTRUCTIONS
(M79.1) Muscle pain  (primary encounter diagnosis)    Comment:      Plan: baclofen (LIORESAL) 10 MG tablet                   (E11.9) Controlled type 2 diabetes mellitus without complication, without long-term current use of insulin (H)    Comment:      Plan:          (E78.5) Hyperlipidemia with target LDL less than 100    Comment:      Plan:          (E11.22,  N18.1) Type 2 diabetes mellitus with stage 1 chronic kidney disease, without long-term current use of insulin (H)    Comment:      Plan:          (I10) Hypertension goal BP (blood pressure) < 130/80    Comment:      Plan:          (M76.891) Enthesopathy of right hip region    Comment:      Plan:

## 2017-12-11 ENCOUNTER — TELEPHONE (OUTPATIENT)
Dept: FAMILY MEDICINE | Facility: CLINIC | Age: 72
End: 2017-12-11

## 2017-12-11 NOTE — TELEPHONE ENCOUNTER
Form for   Special Diet request  [facility] to obtain or release information   Both were brought into [facility], faxed to [phone]  filled out by Dr Hernandez and sent to [facility]

## 2017-12-19 ENCOUNTER — OFFICE VISIT (OUTPATIENT)
Dept: FAMILY MEDICINE | Facility: CLINIC | Age: 72
End: 2017-12-19
Payer: COMMERCIAL

## 2017-12-19 VITALS
SYSTOLIC BLOOD PRESSURE: 124 MMHG | RESPIRATION RATE: 16 BRPM | WEIGHT: 189.5 LBS | TEMPERATURE: 98.6 F | BODY MASS INDEX: 24.66 KG/M2 | DIASTOLIC BLOOD PRESSURE: 72 MMHG | OXYGEN SATURATION: 97 % | HEART RATE: 78 BPM

## 2017-12-19 DIAGNOSIS — M79.672 FOOT PAIN, LEFT: ICD-10-CM

## 2017-12-19 DIAGNOSIS — M25.561 ACUTE PAIN OF RIGHT KNEE: Primary | ICD-10-CM

## 2017-12-19 DIAGNOSIS — I10 HYPERTENSION GOAL BP (BLOOD PRESSURE) < 130/80: Chronic | ICD-10-CM

## 2017-12-19 DIAGNOSIS — E11.9 CONTROLLED TYPE 2 DIABETES MELLITUS WITHOUT COMPLICATION, WITHOUT LONG-TERM CURRENT USE OF INSULIN (H): Chronic | ICD-10-CM

## 2017-12-19 DIAGNOSIS — E78.5 HYPERLIPIDEMIA WITH TARGET LDL LESS THAN 100: Chronic | ICD-10-CM

## 2017-12-19 PROCEDURE — 99214 OFFICE O/P EST MOD 30 MIN: CPT | Performed by: FAMILY MEDICINE

## 2017-12-19 RX ORDER — LIDOCAINE/PRILOCAINE 2.5 %-2.5%
CREAM (GRAM) TOPICAL PRN
Qty: 30 G | Refills: 3 | Status: SHIPPED | OUTPATIENT
Start: 2017-12-19 | End: 2018-07-23

## 2017-12-19 RX ORDER — MELOXICAM 15 MG/1
15 TABLET ORAL DAILY
Qty: 20 TABLET | Refills: 1 | Status: SHIPPED | OUTPATIENT
Start: 2017-12-19 | End: 2018-07-23

## 2017-12-19 RX ORDER — ACETAMINOPHEN 500 MG
500-1000 TABLET ORAL EVERY 6 HOURS PRN
Qty: 100 TABLET | Refills: 11 | Status: SHIPPED | OUTPATIENT
Start: 2017-12-19 | End: 2018-07-23

## 2017-12-19 NOTE — NURSING NOTE
"Chief Complaint   Patient presents with     Musculoskeletal Problem     more meds for traveling       Initial /72  Pulse 78  Temp 98.6  F (37  C) (Tympanic)  Resp 16  Wt 189 lb 8 oz (86 kg)  SpO2 97%  BMI 24.66 kg/m2 Estimated body mass index is 24.66 kg/(m^2) as calculated from the following:    Height as of 6/8/17: 6' 1.5\" (1.867 m).    Weight as of this encounter: 189 lb 8 oz (86 kg).  Medication Reconciliation: complete   Jazlyn Lazar CMA    "

## 2017-12-19 NOTE — PATIENT INSTRUCTIONS
(M25.561) Acute pain of right knee  (primary encounter diagnosis)    Comment:      Plan: meloxicam (MOBIC) 15 MG tablet,           lidocaine-prilocaine (EMLA) cream                   (M79.672) Foot pain, left    Comment:      Plan: acetaminophen (TYLENOL) 500 MG tablet                   (E11.9) Controlled type 2 diabetes mellitus without complication, without long-term current use of insulin (H)    Comment:      Plan:          (E78.5) Hyperlipidemia with target LDL less than 100    Comment:      Plan:          (I10) Hypertension goal BP (blood pressure) < 130/80    Comment:      Plan:          Stop AMARYL WHEN ON THE TRIP  STOP WHEN ON TRIP     FLOMAX  AT HOUR OF SLEEP 0.4MG  STOP SEE IF FEEL Better        Musculoskeletal problem/pain      Duration: 3 months    Description  Location: right knee    Intensity:  7/10    Accompanying signs and symptoms: pain down into calf muscle    History  Previous similar problem: no   Previous evaluation:  none    Precipitating or alleviating factors:  Trauma or overuse: no   Aggravating factors include: walking    Therapies tried and outcome:     PLAN RIGHT KNEE PAIN     EMLA CREAM FOUR TIMES DAILY     MELOXICAM 15MG DAILY WITH FOOD    SIDE EFFECTS BENEFITS AND RISKS DISCUSSED      TREATMENT PROGNOSIS BENEFITS AND RISKS DISCUSSED     MEDICATION RISKS SIDE EFFECTS BENEFITS AND RISKS DISCUSSED   KNEE EXERCISES    ICE TO KNEE 5 MINUTES PRIOR TO EXERCISE IF POSSIBLE  ISOMETRIC KNEE EXTENDED POSITION STANDING X 30 TWICE DAILY \  FLEXION OF KNEE ISOMETRIC 90 DEGREE FLEXION X 30 TWICE DAILY  WITH FIVE POUND WEIGHTS OR PURSE  SITTING EXTENDED KNEE  X 3O SECONDS TWICE DAILY  STANDING WITH KNEE FLEXED X 30 SECONDS TWICE DAILY  CLOSED CHAIN EXERCISE  ,THAT IS ONE 1-2 INCH BOOK 30 REPS ON AND OFF THE BOOK OR PLATFORM   AFTER 2 WEEK INCREASE TO 3 INCHES  AFTER 4 WEEKS INCREASE TO 4 INCHES  WALL SITTING 30 SECONDS 45 DEGREES  AFTER 2 WEEKS 30 SECONDS 60 DEGREES  AFTER  4 WEEKS 30 SECONDS 90  DEGREES  BALANCE 30 SECONDS WITH OPPOSITE LEG AT 30 DEGREES AND ARM TO SIDE X 2 WEEKS  BALANCE 30 SECONDS WITH OPPOSITE LEG AT 60 DEGREES AND ARM TO SIDE X 2 WEEKS  REPEAT with OPPOSITE LEGS BALANCING       A

## 2017-12-19 NOTE — MR AVS SNAPSHOT
After Visit Summary   12/19/2017    Chivo Urias    MRN: [id number]           Patient Information     Date Of Birth          1945        Visit Information        Provider Department      12/19/2017 8:30 AM Darrin Hernandez MD; [facility] [employer] [facility]        Today's Diagnoses     Acute pain of right knee    -  1    Foot pain, left        Controlled type 2 diabetes mellitus without complication, without long-term current use of insulin (H)        Hyperlipidemia with target LDL less than 100        Hypertension goal BP (blood pressure) < 130/80          Care Instructions    (M25.561) Acute pain of right knee  (primary encounter diagnosis)  Comment:    Plan: meloxicam (MOBIC) 15 MG tablet,         lidocaine-prilocaine (EMLA) cream             (M79.672) Foot pain, left  Comment:    Plan: acetaminophen (TYLENOL) 500 MG tablet             (E11.9) Controlled type 2 diabetes mellitus without complication, without long-term current use of insulin (H)  Comment:    Plan:      (E78.5) Hyperlipidemia with target LDL less than 100  Comment:    Plan:      (I10) Hypertension goal BP (blood pressure) < 130/80  Comment:    Plan:        Stop AMARYL WHEN ON THE TRIP  STOP WHEN ON TRIP   FLOMAX  AT HOUR OF SLEEP 0.4MG  STOP SEE IF FEEL LEILANI   Current Outpatient Prescriptions   Medication     acetaminophen (TYLENOL) 500 MG tablet     meloxicam (MOBIC) 15 MG tablet     lidocaine-prilocaine (EMLA) cream     baclofen (LIORESAL) 10 MG tablet     glimepiride (AMARYL) 1 MG tablet     tamsulosin (FLOMAX) 0.4 MG capsule     finasteride (PROSCAR) 5 MG tablet     Blood Pressure Monitoring (WRIST BLOOD PRESSURE MONITOR) MISC     aspirin 81 MG chewable tablet     blood glucose monitoring (MIKHAIL MICROLET) lancets     metFORMIN (GLUCOPHAGE-XR) 500 MG 24 hr tablet     cholecalciferol (VITAMIN D-1000 MAX ST) 1000 UNITS TABS     fluticasone (FLONASE) 50 MCG/ACT spray  "    levocetirizine (XYZAL) 5 MG tablet     atorvastatin (LIPITOR) 10 MG tablet     blood glucose monitoring (MIKHAIL CONTOUR NEXT) test strip     ketotifen (ZADITOR) 0.025 % SOLN ophthalmic solution     Trolamine Salicylate (ASPERCREME) 10 % LOTN     blood glucose monitoring (MIKHAIL CONTOUR MONITOR) meter device kit     lisinopril (PRINIVIL/ZESTRIL) 2.5 MG tablet     ranitidine (ZANTAC) 150 MG tablet     No current facility-administered medications for this visit.                     Follow-ups after your visit        Who to contact     If you have questions or need follow up information about today's clinic visit or your schedule please contact [facility] directly at [phone].  Normal or non-critical lab and imaging results will be communicated to you by Aubreyhart, letter or phone within 4 business days after the clinic has received the results. If you do not hear from us within 7 days, please contact the clinic through Aubreyhart or phone. If you have a critical or abnormal lab result, we will notify you by phone as soon as possible.  Submit refill requests through Moonshado or call your pharmacy and they will forward the refill request to us. Please allow 3 business days for your refill to be completed.          Additional Information About Your Visit        Moonshado Information     Moonshado lets you send messages to your doctor, view your test results, renew your prescriptions, schedule appointments and more. To sign up, go to [email or web address] . Click on \"Log in\" on the left side of the screen, which will take you to the Welcome page. Then click on \"Sign up Now\" on the right side of the page.     You will be asked to enter the access code listed below, as well as some personal information. Please follow the directions to create your username and password.     Your access code is: [email or web address]  Expires: 2017  7:41 AM     Your access code will  in 90 days. If you need " help or a new code, please call your [facility] clinic or [phone].        Care EveryWhere ID     This is your Care EveryWhere ID. This could be used by other organizations to access your [facility] medical records  [id number]        Your Vitals Were     Pulse Temperature Respirations Pulse Oximetry BMI (Body Mass Index)       78 98.6  F (37  C) (Tympanic) 16 97% 24.66 kg/m2        Blood Pressure from Last 3 Encounters:   12/19/17 124/72   12/08/17 138/72   09/29/17 104/64    Weight from Last 3 Encounters:   12/19/17 189 lb 8 oz (86 kg)   12/08/17 192 lb (87.1 kg)   09/29/17 189 lb 8 oz (86 kg)              Today, you had the following     No orders found for display         Today's Medication Changes          These changes are accurate as of: 12/19/17  9:01 AM.  If you have any questions, ask your nurse or doctor.               Start taking these medicines.        Dose/Directions    lidocaine-prilocaine cream   Commonly known as:  EMLA   Used for:  Acute pain of right knee   Started by:  Darrin Hernandez MD        Apply topically as needed for moderate pain   Quantity:  30 g   Refills:  3       meloxicam 15 MG tablet   Commonly known as:  MOBIC   Used for:  Acute pain of right knee   Started by:  Darrin Hernandez MD        Dose:  15 mg   Take 1 tablet (15 mg) by mouth daily   Quantity:  20 tablet   Refills:  1            Where to get your medicines      These medications were sent to [facility] - [place], [place] - [address] [place] [address]  [address] [facility] [address] [place] [address]     Phone:  [phone]     acetaminophen 500 MG tablet    lidocaine-prilocaine cream    meloxicam 15 MG tablet                Primary Care Provider Office Phone # Fax #    Darrin Hernandez [phone] [address]  [facility] [address]        Equal Access to Services     NAYELY WEBB AH: Kaya Garcia, waaxda luqadaha, qaybta kaalry gaona, mundo leal  joey guzmanyanndayo grant'aaoscar ah. So [facility] [phone].    ATENCIÓN: Si cirilo wyman, tiene a pizano disposición servicios gratuitos de asistencia lingüística. Lenny biggs [phone].    We comply with applicable federal civil rights laws and [place] laws. We do not discriminate on the basis of race, color, national origin, age, disability, sex, sexual orientation, or gender identity.            Thank you!     Thank you for choosing [facility]  for your care. Our goal is always to provide you with excellent care. Hearing back from our patients is one way we can continue to improve our services. Please take a few minutes to complete the written survey that you may receive in the mail after your visit with us. Thank you!             Your Updated Medication List - Protect others around you: Learn how to safely use, store and throw away your medicines at www.disposemymeds.org.          This list is accurate as of: 12/19/17  9:01 AM.  Always use your most recent med list.                   Brand Name Dispense Instructions for use Diagnosis    acetaminophen 500 MG tablet    TYLENOL    100 tablet    Take 1-2 tablets (500-1,000 mg) by mouth every 6 hours as needed    Foot pain, left       aspirin 81 MG chewable tablet     108 tablet    Take 1 tablet (81 mg) by mouth daily    Essential hypertension with goal blood pressure less than 130/80, Hyperlipidemia with target LDL less than 100       atorvastatin 10 MG tablet    LIPITOR    30 tablet    Take 1 tablet (10 mg) by mouth daily    Pure hypercholesterolemia       baclofen 10 MG tablet    LIORESAL    90 tablet    Take 1/2 tab (5 mg) three times daily  As  Needed for pain    Muscle pain       blood glucose monitoring lancets     1 Box    1 each 2 times daily    Diabetic polyneuropathy associated with diabetes mellitus due to underlying condition (H)       blood glucose monitoring meter device kit     1 kit    Use to test blood sugars TWICE DAILY  times  daily or as directed.    Type 2 diabetes mellitus without complication, without long-term current use of insulin (H)       blood glucose monitoring test strip    MIKHAIL CONTOUR NEXT    100 each    1 strip by In Vitro route daily    Diabetic polyneuropathy associated with diabetes mellitus due to underlying condition (H)       cholecalciferol 1000 UNITS Tabs    VITAMIN D-1000 MAX ST    90 tablet    Take 3 tablets by mouth daily    Screening for diabetic peripheral neuropathy, Cramp of both lower extremities       finasteride 5 MG tablet    PROSCAR    90 tablet    Take 1 tablet (5 mg) by mouth daily    Benign non-nodular prostatic hyperplasia without lower urinary tract symptoms       fluticasone 50 MCG/ACT spray    FLONASE    16 g    Spray 1-2 sprays into both nostrils daily    Seasonal allergic rhinitis due to pollen       glimepiride 1 MG tablet    AMARYL    30 tablet    Take 1 tablet (1 mg) by mouth every morning (before breakfast)    Controlled type 2 diabetes mellitus without complication, without long-term current use of insulin (H)       ketotifen 0.025 % Soln ophthalmic solution    ZADITOR    1 Bottle    Place 1 drop into both eyes 2 times daily    Seasonal allergic conjunctivitis       levocetirizine 5 MG tablet    XYZAL    30 tablet    Take 1 tablet (5 mg) by mouth every evening    Seasonal allergic rhinitis due to pollen       lidocaine-prilocaine cream    EMLA    30 g    Apply topically as needed for moderate pain    Acute pain of right knee       lisinopril 2.5 MG tablet    PRINIVIL/Zestril    30 tablet    Take 1 tablet (2.5 mg) by mouth daily    Type 2 diabetes mellitus with stage 1 chronic kidney disease, without long-term current use of insulin (H)       meloxicam 15 MG tablet    MOBIC    20 tablet    Take 1 tablet (15 mg) by mouth daily    Acute pain of right knee       metFORMIN 500 MG 24 hr tablet    GLUCOPHAGE-XR    60 tablet    TAKE 1 TABLET BY MOUTH TWICE DAILY WITH MEALS    Controlled type 2  diabetes mellitus without complication, without long-term current use of insulin (H)       ranitidine 150 MG tablet    ZANTAC    60 tablet    Take 1 tablet (150 mg) by mouth 2 times daily    Gastroesophageal reflux disease without esophagitis       tamsulosin 0.4 MG capsule    FLOMAX    90 capsule    Take 1 capsule (0.4 mg) by mouth daily    Benign non-nodular prostatic hyperplasia without lower urinary tract symptoms       Trolamine Salicylate 10 % Lotn    ASPERCREME    120 mL    Externally apply topically 4 times daily as needed    Knee bursitis, right       Wrist Blood Pressure Monitor Misc     1 each    1 each 2 times daily as needed    Benign essential hypertension

## 2017-12-19 NOTE — PROGRESS NOTES
SUBJECTIVE:   Chivo Urias is a 72 year old male who presents to clinic today for the following health issues:      Concern - leg pain  Ongoing right knee pain   Wants more intense treatment   Onset: ongoing    Description:   Going to be traveling for 11 days. Wants strong pain meds for travel    Intensity: moderate    Progression of Symptoms:  same    Accompanying Signs & Symptoms:  walking    Previous history of similar problem:   yes    Precipitating factors:   Worsened by: walking    Alleviating factors:  Improved by: meds    Therapies Tried and outcome: na          Concern -  Dizziness when rising   On flomax for benign prostatic hypertrophy   No other HIGH BLOOD PRESSURE MEDICATIONS      Onset:  INSIDIOUS LAST FEW MONTHS     Description:    WHEN GETS UP QUICKLY     Intensity: mild    Progression of Symptoms:  improving    Accompanying Signs & Symptoms:  ONGOING SHORT EPISODES        Previous history of similar problem:    NO     Precipitating factors:   Worsened by:  UNCERTAIN     Alleviating factors:  Improved by:  TIME LESS THAN 5 MINUTES        Therapies Tried and outcome:  NOT APPLICABLE     PLAN OFF FLOMAX AS TREATMENT TRIAL TO SEE IF SYMPTOMS IMPROVE OR NOT                   Musculoskeletal problem/pain      Duration: 3 months    Description  Location: right knee    Intensity:  7/10    Accompanying signs and symptoms: pain down into calf muscle    History  Previous similar problem: no   Previous evaluation:  none    Precipitating or alleviating factors:  Trauma or overuse: no   Aggravating factors include: walking    Therapies tried and outcome:     PLAN RIGHT KNEE PAIN     EMLA CREAM FOUR TIMES DAILY     MELOXICAM 15MG DAILY WITH FOOD    SIDE EFFECTS BENEFITS AND RISKS DISCUSSED      TREATMENT PROGNOSIS BENEFITS AND RISKS DISCUSSED     MEDICATION RISKS SIDE EFFECTS BENEFITS AND RISKS DISCUSSED   KNEE EXERCISES    ICE TO KNEE 5 MINUTES PRIOR TO EXERCISE IF POSSIBLE  ISOMETRIC KNEE EXTENDED  POSITION STANDING X 30 TWICE DAILY \  FLEXION OF KNEE ISOMETRIC 90 DEGREE FLEXION X 30 TWICE DAILY  WITH FIVE POUND WEIGHTS OR PURSE  SITTING EXTENDED KNEE  X 3O SECONDS TWICE DAILY  STANDING WITH KNEE FLEXED X 30 SECONDS TWICE DAILY  CLOSED CHAIN EXERCISE  ,THAT IS ONE 1-2 INCH BOOK 30 REPS ON AND OFF THE BOOK OR PLATFORM   AFTER 2 WEEK INCREASE TO 3 INCHES  AFTER 4 WEEKS INCREASE TO 4 INCHES  WALL SITTING 30 SECONDS 45 DEGREES  AFTER 2 WEEKS 30 SECONDS 60 DEGREES  AFTER  4 WEEKS 30 SECONDS 90 DEGREES  BALANCE 30 SECONDS WITH OPPOSITE LEG AT 30 DEGREES AND ARM TO SIDE X 2 WEEKS  BALANCE 30 SECONDS WITH OPPOSITE LEG AT 60 DEGREES AND ARM TO SIDE X 2 WEEKS  REPEAT with OPPOSITE LEGS BALANCING       A            Diabetes Follow-up    Patient is checking blood sugars: once daily.  Results are as follows:       am - 120-140        Diabetic concerns: None     Symptoms of hypoglycemia (low blood sugar): none     Paresthesias (numbness or burning in feet) or sores: No     Date of last diabetic eye exam: 3 months ago  BP Readings from Last 2 Encounters:   12/19/17 124/72   12/08/17 138/72     Hemoglobin A1C (%)   Date Value   09/29/2017 7.1 (H)   06/08/2017 7.6 (H)     LDL Cholesterol Calculated (mg/dL)   Date Value   09/29/2017 39   11/10/2016 62           Problem list and histories reviewed & adjusted, as indicated.  Additional history: as documented      Patient Active Problem List   Diagnosis     Health Care Home     Myalgia and myositis     Allergic rhinitis     Sciatica     Glaucoma     Foot pain, left     Controlled type 2 diabetes mellitus without complication (H)     Hyperlipidemia with target LDL less than 100     Vitamin D insufficiency     DDD (degenerative disc disease), lumbar     Stage 1 chronic renal impairment associated with type 2 diabetes mellitus (H)     Comprehensive diabetic foot examination, type 2 DM, encounter for (H)     Low HDL (under 40)     Hypertension goal BP (blood pressure) < 130/80     CKD  (chronic kidney disease) stage 2, GFR 60-89 ml/min     Right hip pain     Type 2 diabetes, HbA1C goal < 8% (H)     ACP (advance care planning)     Enthesopathy of right hip region     History reviewed. No pertinent surgical history.    Social History   Substance Use Topics     Smoking status: Never Smoker     Smokeless tobacco: Never Used     Alcohol use No     Family History   Problem Relation Age of Onset     Family History Negative Mother      Family History Negative Father          Current Outpatient Prescriptions   Medication Sig Dispense Refill     acetaminophen (TYLENOL) 500 MG tablet Take 1-2 tablets (500-1,000 mg) by mouth every 6 hours as needed 100 tablet 11     meloxicam (MOBIC) 15 MG tablet Take 1 tablet (15 mg) by mouth daily 20 tablet 1     lidocaine-prilocaine (EMLA) cream Apply topically as needed for moderate pain 30 g 3     baclofen (LIORESAL) 10 MG tablet Take 1/2 tab (5 mg) three times daily  As  Needed for pain 90 tablet 0     glimepiride (AMARYL) 1 MG tablet Take 1 tablet (1 mg) by mouth every morning (before breakfast) 30 tablet 11     tamsulosin (FLOMAX) 0.4 MG capsule Take 1 capsule (0.4 mg) by mouth daily 90 capsule 3     finasteride (PROSCAR) 5 MG tablet Take 1 tablet (5 mg) by mouth daily 90 tablet 3     Blood Pressure Monitoring (WRIST BLOOD PRESSURE MONITOR) MISC 1 each 2 times daily as needed 1 each 0     aspirin 81 MG chewable tablet Take 1 tablet (81 mg) by mouth daily 108 tablet 3     blood glucose monitoring (MIKHAIL MICROLET) lancets 1 each 2 times daily 1 Box prn     metFORMIN (GLUCOPHAGE-XR) 500 MG 24 hr tablet TAKE 1 TABLET BY MOUTH TWICE DAILY WITH MEALS 60 tablet 11     cholecalciferol (VITAMIN D-1000 MAX ST) 1000 UNITS TABS Take 3 tablets by mouth daily 90 tablet 11     fluticasone (FLONASE) 50 MCG/ACT spray Spray 1-2 sprays into both nostrils daily 16 g 11     levocetirizine (XYZAL) 5 MG tablet Take 1 tablet (5 mg) by mouth every evening 30 tablet 11     atorvastatin  (LIPITOR) 10 MG tablet Take 1 tablet (10 mg) by mouth daily 30 tablet 11     blood glucose monitoring (MIKHAIL CONTOUR NEXT) test strip 1 strip by In Vitro route daily 100 each 11     ketotifen (ZADITOR) 0.025 % SOLN ophthalmic solution Place 1 drop into both eyes 2 times daily 1 Bottle 11     Trolamine Salicylate (ASPERCREME) 10 % LOTN Externally apply topically 4 times daily as needed 120 mL 11     blood glucose monitoring (MIKHAIL CONTOUR MONITOR) meter device kit Use to test blood sugars TWICE DAILY  times daily or as directed. 1 kit 0     lisinopril (PRINIVIL/ZESTRIL) 2.5 MG tablet Take 1 tablet (2.5 mg) by mouth daily 30 tablet 11     ranitidine (ZANTAC) 150 MG tablet Take 1 tablet (150 mg) by mouth 2 times daily 60 tablet 11     No Known Allergies  Recent Labs   Lab Test  09/29/17   0832  06/08/17   0808  12/08/16   0920  11/10/16   1028  09/15/16   0906   03/04/14   1008   A1C  7.1*  7.6*   --   6.9*  6.7*   < >  6.1*   LDL  39   --    --   62  69   < >  111   HDL  40   --    --   42  45   < >  44   TRIG  306*   --    --   216*  131   < >  137   ALT  24  23   --   32  29   < >  28   CR  0.90  0.88   --   1.08  1.04   < >  1.10   GFRESTIMATED  83  85   --   67  70   < >  66   GFRESTBLACK  >90  >90  African American GFR Calc     --   81  85   < >  80   POTASSIUM  4.2  4.0   --   4.2  4.2   < >  4.3   TSH   --    --   0.99   --    --    --   1.02    < > = values in this interval not displayed.      BP Readings from Last 3 Encounters:   12/19/17 124/72   12/08/17 138/72   09/29/17 104/64    Wt Readings from Last 3 Encounters:   12/19/17 189 lb 8 oz (86 kg)   12/08/17 192 lb (87.1 kg)   09/29/17 189 lb 8 oz (86 kg)                  Labs reviewed in EPIC          Reviewed and updated as needed this visit by clinical staffTobacco  Allergies  Meds  Med Hx  Surg Hx  Fam Hx  Soc Hx      Reviewed and updated as needed this visit by Provider         ROS: has Health Care Home; Myalgia and myositis; Allergic rhinitis;  Sciatica; Glaucoma; Foot pain, left; Controlled type 2 diabetes mellitus without complication (H); Hyperlipidemia with target LDL less than 100; Vitamin D insufficiency; DDD (degenerative disc disease), lumbar; Stage 1 chronic renal impairment associated with type 2 diabetes mellitus (H); Comprehensive diabetic foot examination, type 2 DM, encounter for (H); Low HDL (under 40); Hypertension goal BP (blood pressure) < 130/80; CKD (chronic kidney disease) stage 2, GFR 60-89 ml/min; Right hip pain; Type 2 diabetes, HbA1C goal < 8% (H); ACP (advance care planning); and Enthesopathy of right hip region on his problem list.  MYALGIAS  SEASONAL ALLERGIC RHINITIS   LOWER BACK PAIN with SCIATICA   KNEE PAIN   RIGHT HIP PAIN   DIABETES 2 GOAL 8% CONTROLLED   CHRONIC KIDNEY DISEASE STAGE 2     Review Of Systems  Skin: negative  Eyes: negative  Ears/Nose/Throat: negative  Respiratory: No shortness of breath, dyspnea on exertion, cough, or hemoptysis  Cardiovascular: negative  Gastrointestinal: heartburn  Genitourinary:  CHRONIC KIDNEY DISEASE MILD   Musculoskeletal: back pain RIGHT HIP   Neurologic: negative  Psychiatric: negative  Hematologic/Lymphatic/Immunologic: negative  Endocrine: diabetes      OBJECTIVE:     /72  Pulse 78  Temp 98.6  F (37  C) (Tympanic)  Resp 16  Wt 189 lb 8 oz (86 kg)  SpO2 97%  BMI 24.66 kg/m2  Body mass index is 24.66 kg/(m^2).  GENERAL: healthy, alert and no distress  EYES: Eyes grossly normal to inspection, PERRL and conjunctivae and sclerae normal  HENT: ear canals and TM's normal, nose and mouth without ulcers or lesions  NECK: no adenopathy, no asymmetry, masses, or scars and thyroid normal to palpation  RESP: lungs clear to auscultation - no rales, rhonchi or wheezes  CV: regular rate and rhythm, normal S1 S2, no S3 or S4, no murmur, click or rub, no peripheral edema and peripheral pulses strong  ABDOMEN: soft, nontender, no hepatosplenomegaly, no masses and bowel sounds normal  MS:  no gross musculoskeletal defects noted, no edema  RIGHT SIDED LEG PAIN   DECREASE RANGE OF MOTION OF BACK  SKIN: no suspicious lesions or rashes  NEURO: Normal strength and tone, mentation intact and speech normal  PSYCH: mentation appears normal, affect normal/bright  LYMPH: no cervical, supraclavicular, axillary, or inguinal adenopathy  Diabetic foot exam: normal DP and PT pulses, no trophic changes or ulcerative lesions, normal sensory exam and normal monofilament exam    Diagnostic Test Results:  Results for orders placed or performed in visit on 10/02/17   Fecal colorectal cancer screen FIT   Result Value Ref Range    Occult Blood Scn FIT Negative NEG^Negative       ASSESSMENT/PLAN:           ICD-10-CM    1. Acute pain of right knee M25.561 meloxicam (MOBIC) 15 MG tablet     lidocaine-prilocaine (EMLA) cream   2. Foot pain, left M79.672 acetaminophen (TYLENOL) 500 MG tablet   3. Controlled type 2 diabetes mellitus without complication, without long-term current use of insulin (H) E11.9    4. Hyperlipidemia with target LDL less than 100 E78.5    5. Hypertension goal BP (blood pressure) < 130/80 I10        Patient Instructions   (M25.561) Acute pain of right knee  (primary encounter diagnosis)  Comment:    Plan: meloxicam (MOBIC) 15 MG tablet,         lidocaine-prilocaine (EMLA) cream             (M79.672) Foot pain, left  Comment:    Plan: acetaminophen (TYLENOL) 500 MG tablet             (E11.9) Controlled type 2 diabetes mellitus without complication, without long-term current use of insulin (H)  Comment:    Plan:      (E78.5) Hyperlipidemia with target LDL less than 100  Comment:    Plan:      (I10) Hypertension goal BP (blood pressure) < 130/80  Comment:    Plan:        Stop AMARYL WHEN ON THE TRIP  STOP WHEN ON TRIP   FLOMAX  AT HOUR OF SLEEP 0.4MG  STOP SEE IF FEEL LEILANI LAMBERT MD  [facility]

## 2018-01-29 ENCOUNTER — TRANSFERRED RECORDS (OUTPATIENT)
Dept: HEALTH INFORMATION MANAGEMENT | Facility: CLINIC | Age: 73
End: 2018-01-29

## 2018-02-05 ENCOUNTER — TRANSFERRED RECORDS (OUTPATIENT)
Dept: HEALTH INFORMATION MANAGEMENT | Facility: CLINIC | Age: 73
End: 2018-02-05

## 2018-03-19 ENCOUNTER — CARE COORDINATION (OUTPATIENT)
Dept: GERIATRIC MEDICINE | Facility: CLINIC | Age: 73
End: 2018-03-19

## 2018-03-19 NOTE — PROGRESS NOTES
"[facility] Home Visit Assessment     Home visit for Health Risk Assessment with Chivo Urias completed on March 19, 2018  Member resides in [place] and lives alone.   Present at assessment: member and this care coordinator. Member complains of worsening right knee pain with transfers and ambulation.  Member reported he has been receiving cortisone injections with little improvement and is now considering a surgery. Member also reported pressure on his left eye is increasing due to glaucoma. Member is independent with all of his ADLs needs. Has adult children living with their mother and member sates \" they are busy with their lives working and attending colloges \"  CM recommends [facility] referral but member declines at this time.    Current Care Plan  Member currently receiving the following services: Homemaking.      Medication Review  Medication reconciliation completed in Epic:Yes  Medication set-up & administration: Independent-does not set up.  Self-administers medications.  Medication understanding concerns (by member, family or CC): No  Medication adherence concerns (by member, family or CC): Yes. Member has not been taking his BP meds, states \" I don't have any hypertension\". Declines referral for home care nurse or MTM.     Mental/Behavioral Health   Depression Screening: See PHQ assessment flowsheet.   Mental Health Diagnosis: No  No current MH services-will place referral for N/A    Falls in last 12 months: No. Erinn risk due to weak and painful right knee.     ADL/IADL Dependencies: Ambulation- cane, Cleaning, Cooking, Laundry, Shopping, Meal prep, Medication Management, Money Management and Transportation.     [facility] Plan sponsored benefits: Shared information re: Silver Sneakers/gym memberships, ASA, Calcium +D.    PCA Assessment completed at visit: Not applicable     Elderly Waiver Eligibility: Yes-will open to EW    Care Plan & Recommendations: Will close " ECS and reopen member to EW.  Will not share care plan or budget worksheet with EW provider per client's request.     See RUST for detailed assessment information.    Follow-Up Plan: Member informed of future contact, plan to f/u with member with a 6 month telephone assessment.  Contact information shared with member and family, encouraged member to call with any questions or concerns at any time.  [place] care continuum providers: Please refer to Health Care Home on the Epic Problem List to view this patient's [facility] Care Plan Summary.    Laura Romo RN BSN PHN  [facility]   [phone]  Fax: [phone]

## 2018-03-20 NOTE — PROGRESS NOTES
Sent ECS tool to [facility] asking to close ECS due to switching to EW. Also sent [phone] to [facility], request to reopen EW.    Laura Romo RN BSN N  [facility]   [phone]  Fax: [phone]

## 2018-03-22 ENCOUNTER — TRANSFERRED RECORDS (OUTPATIENT)
Dept: HEALTH INFORMATION MANAGEMENT | Facility: CLINIC | Age: 73
End: 2018-03-22

## 2018-03-23 ENCOUNTER — TRANSFERRED RECORDS (OUTPATIENT)
Dept: HEALTH INFORMATION MANAGEMENT | Facility: CLINIC | Age: 73
End: 2018-03-23

## 2018-03-31 ENCOUNTER — TRANSFERRED RECORDS (OUTPATIENT)
Dept: HEALTH INFORMATION MANAGEMENT | Facility: CLINIC | Age: 73
End: 2018-03-31

## 2018-04-06 ENCOUNTER — PATIENT OUTREACH (OUTPATIENT)
Dept: GERIATRIC MEDICINE | Facility: CLINIC | Age: 73
End: 2018-04-06

## 2018-04-06 NOTE — LETTER
April 6, 2018    CHIVO HERNANDEZ  [address]  [address]  [place] [address]    Dear Chivo,     At [facility], we re dedicated to improving the health and well-being of our members.  Enclosed you will find the Comprehensive Care Plan that was developed with you on 3/19/2018. Please review the Care Plan carefully.    As a reminder, some of the things we discussed at your visit include:    Ways to improve or maintain your physical health such as walking 20 minutes a day.     Ways to reduce the risk of falls.     Health care needs you may have.     Don t forget to contact your care coordinator if you:    Have been hospitalized or plan to be hospitalized.     Have experienced a fall.      Have experienced a change in physical health, which may include bladder control and pain issues.      Are experiencing emotional problems.     If you do not agree with your Care Plan, have questions about it, or have experienced a change in your needs, please contact me, your care coordinator, at [phone]. If you are hearing impaired, please call the [place] [employer] at 753 or [phone] ([email or web address] relay service).    Sincerely,      Laura Romo RN, [facility]     [facility] is a health plan that contracts with both Medicare and the [place] Medical Assistance (Medicaid) program to provide benefits of both programs to enrollees. Enrollment in [facility] depends on contract renewal.    MSC+ [id number] IA ([id number])                                                  (12/16)

## 2018-04-06 NOTE — PROGRESS NOTES
[facility] Care Coordination Contact  Received after visit chart from care coordinator.  Completed following tasks: Mailed copy of care plan to client, Submitted referrals/auths for hmkg and Entered MMIS   Provider Signature - No POC Shared:  Member indicates that they do not want their POC shared with any EW providers.  Chart was returned to DONN Thakkar  Case Management Specialist  [facility]  [phone]

## 2018-05-01 ENCOUNTER — TRANSFERRED RECORDS (OUTPATIENT)
Dept: HEALTH INFORMATION MANAGEMENT | Facility: CLINIC | Age: 73
End: 2018-05-01

## 2018-06-28 ENCOUNTER — OFFICE VISIT (OUTPATIENT)
Dept: FAMILY MEDICINE | Facility: CLINIC | Age: 73
End: 2018-06-28
Payer: COMMERCIAL

## 2018-06-28 ENCOUNTER — RADIANT APPOINTMENT (OUTPATIENT)
Dept: GENERAL RADIOLOGY | Facility: CLINIC | Age: 73
End: 2018-06-28
Attending: PHYSICIAN ASSISTANT
Payer: COMMERCIAL

## 2018-06-28 VITALS
OXYGEN SATURATION: 100 % | SYSTOLIC BLOOD PRESSURE: 112 MMHG | TEMPERATURE: 96.3 F | DIASTOLIC BLOOD PRESSURE: 64 MMHG | RESPIRATION RATE: 16 BRPM | WEIGHT: 182 LBS | BODY MASS INDEX: 23.69 KG/M2 | HEART RATE: 56 BPM

## 2018-06-28 DIAGNOSIS — E11.22 TYPE 2 DIABETES MELLITUS WITH STAGE 1 CHRONIC KIDNEY DISEASE, WITHOUT LONG-TERM CURRENT USE OF INSULIN (H): ICD-10-CM

## 2018-06-28 DIAGNOSIS — Z01.818 PREOP GENERAL PHYSICAL EXAM: Primary | ICD-10-CM

## 2018-06-28 DIAGNOSIS — E11.9 CONTROLLED TYPE 2 DIABETES MELLITUS WITHOUT COMPLICATION, WITHOUT LONG-TERM CURRENT USE OF INSULIN (H): Chronic | ICD-10-CM

## 2018-06-28 DIAGNOSIS — M17.11 PRIMARY OSTEOARTHRITIS OF RIGHT KNEE: ICD-10-CM

## 2018-06-28 DIAGNOSIS — Z01.818 PREOP GENERAL PHYSICAL EXAM: ICD-10-CM

## 2018-06-28 DIAGNOSIS — N18.1 TYPE 2 DIABETES MELLITUS WITH STAGE 1 CHRONIC KIDNEY DISEASE, WITHOUT LONG-TERM CURRENT USE OF INSULIN (H): ICD-10-CM

## 2018-06-28 LAB
ANION GAP SERPL CALCULATED.3IONS-SCNC: 9 MMOL/L (ref 3–14)
BUN SERPL-MCNC: 12 MG/DL (ref 7–30)
CALCIUM SERPL-MCNC: 9 MG/DL (ref 8.5–10.1)
CHLORIDE SERPL-SCNC: 105 MMOL/L (ref 94–109)
CO2 SERPL-SCNC: 25 MMOL/L (ref 20–32)
CREAT SERPL-MCNC: 1.03 MG/DL (ref 0.66–1.25)
ERYTHROCYTE [DISTWIDTH] IN BLOOD BY AUTOMATED COUNT: 13.3 % (ref 10–15)
GFR SERPL CREATININE-BSD FRML MDRD: 70 ML/MIN/1.7M2
GLUCOSE SERPL-MCNC: 130 MG/DL (ref 70–99)
HCT VFR BLD AUTO: 49.3 % (ref 40–53)
HGB BLD-MCNC: 16.9 G/DL (ref 13.3–17.7)
MCH RBC QN AUTO: 30.3 PG (ref 26.5–33)
MCHC RBC AUTO-ENTMCNC: 34.3 G/DL (ref 31.5–36.5)
MCV RBC AUTO: 88 FL (ref 78–100)
PLATELET # BLD AUTO: 194 10E9/L (ref 150–450)
POTASSIUM SERPL-SCNC: 4.3 MMOL/L (ref 3.4–5.3)
RBC # BLD AUTO: 5.58 10E12/L (ref 4.4–5.9)
SODIUM SERPL-SCNC: 139 MMOL/L (ref 133–144)
WBC # BLD AUTO: 5.4 10E9/L (ref 4–11)

## 2018-06-28 PROCEDURE — 93000 ELECTROCARDIOGRAM COMPLETE: CPT | Performed by: PHYSICIAN ASSISTANT

## 2018-06-28 PROCEDURE — 99214 OFFICE O/P EST MOD 30 MIN: CPT | Mod: 25 | Performed by: PHYSICIAN ASSISTANT

## 2018-06-28 PROCEDURE — 71046 X-RAY EXAM CHEST 2 VIEWS: CPT | Mod: FY

## 2018-06-28 PROCEDURE — 36415 COLL VENOUS BLD VENIPUNCTURE: CPT | Performed by: PHYSICIAN ASSISTANT

## 2018-06-28 PROCEDURE — 80048 BASIC METABOLIC PNL TOTAL CA: CPT | Performed by: PHYSICIAN ASSISTANT

## 2018-06-28 PROCEDURE — 85027 COMPLETE CBC AUTOMATED: CPT | Performed by: PHYSICIAN ASSISTANT

## 2018-06-28 ASSESSMENT — MIFFLIN-ST. JEOR: SCORE: 1633.9

## 2018-06-28 NOTE — PROGRESS NOTES
[address]  [address]  [facility] [address]  [phone]  Dept: [phone]    PRE-OP EVALUATION:  Today's date: 2018    Chivo Urias (: 1945) presents for pre-operative evaluation assessment as requested by Dr. Eugenio Alexandra.  He requires evaluation and anesthesia risk assessment prior to undergoing surgery/procedure for treatment of knee replacement right knee .    Fax number for surgical facility: [facility]  Primary Physician: Darrin Hernandez  Type of Anesthesia Anticipated: to be determined    Patient has a Health Care Directive or Living Will:  NO    Preop Questions 2018   Who is doing your surgery? dr eugenio alexandra   Date of Surgery/Procedure:    Facility or Hospital where procedure/surgery will be performed: [facility]   1.  Do you have a history of Heart attack, stroke, stent, coronary bypass surgery, or other heart surgery? No   2.  Do you ever have any pain or discomfort in your chest? No   3.  Do you have a history of  Heart Failure? No   4.   Are you troubled by shortness of breath when:  walking on a level surface, or up a slight hill, or at night? No   5.  Do you currently have a cold, bronchitis or other respiratory infection? No   6.  Do you have a cough, shortness of breath, or wheezing? No   7.  Do you sometimes get pains in the calves of your legs when you walk? No   8. Do you or anyone in your family have previous history of blood clots? No   9.  Do you or does anyone in your family have a serious bleeding problem such as prolonged bleeding following surgeries or cuts? No   10. Have you ever had problems with anemia or been told to take iron pills? No   11. Have you had any abnormal blood loss such as black, tarry or bloody stools? No   12. Have you ever had a blood transfusion? No   13. Have you or any of your relatives ever had problems with anesthesia? No   14. Do you have  sleep apnea, excessive snoring or daytime drowsiness? No   15. Do you have any prosthetic heart valves? No   16. Do you have prosthetic joints? No         HPI:     HPI related to upcoming procedure: chronic OA of R knee with pain and decreased function for > 1 year.       See problem list for active medical problems.  Problems all longstanding and stable, except as noted/documented.  See ROS for pertinent symptoms related to these conditions.                                                                                                                                                          .    MEDICAL HISTORY:     Patient Active Problem List    Diagnosis Date Noted     Enthesopathy of right hip region 09/15/2016     Priority: Medium     ACP (advance care planning) 01/19/2016     Priority: Medium     Advance Care Planning 1/19/2016: ACP Review of Chart / Resources Provided:  Reviewed chart for advance care plan.  Chivo Urias has no plan or code status on file. Discussed available resources and provided with information. Pt declined form at this time.  Added by Stella Khan           Type 2 diabetes, HbA1C goal < 8% (H) 11/02/2015     Priority: Medium     Right hip pain 10/15/2015     Priority: Medium     Low HDL (under 40) 06/19/2013     Priority: Medium     Hypertension goal BP (blood pressure) < 130/80 06/19/2013     Priority: Medium     CKD (chronic kidney disease) stage 2, GFR 60-89 ml/min 06/19/2013     Priority: Medium     Stage 1 chronic renal impairment associated with type 2 diabetes mellitus (H) 06/10/2013     Priority: Medium     Comprehensive diabetic foot examination, type 2 DM, encounter for (H) 06/10/2013     Priority: Medium     Controlled type 2 diabetes mellitus without complication (H) 05/23/2013     Priority: Medium     Hyperlipidemia with target LDL less than 100 05/23/2013     Priority: Medium     Diagnosis updated by automated process. Provider to review and confirm.        Vitamin D insufficiency 2013     Priority: Medium     DDD (degenerative disc disease), lumbar 2013     Priority: Medium     radic right leg       Foot pain, left 2013     Priority: Medium     Myalgia and myositis 2013     Priority: Medium     Problem list name updated by automated process. Provider to review       Allergic rhinitis 2013     Priority: Medium     Problem list name updated by automated process. Provider to review       Sciatica 2013     Priority: Medium     Glaucoma 2013     Priority: Medium     Problem list name updated by automated process. Provider to review       Health Care Home 2012     Priority: Medium     [facility] Case Management  Laura Romo RN  [phone]   [facility] CARE PLAN SUMMARY    Client Name:  Chivo Urias  Address:  [address] [place] [address] Phone: [phone] (home)    :  1945 Date of Assessment: 18   Health Plan: [facility]   Health Plan Number: [id number] Medical Assistance Number: [id number]  Financial Worker:  Skyler   Case #:  [id number]   [facility] :  Laura Romo RN CM Phone:  [phone]   Fax:  [phone]   [facility] Enrollment Date: 13 Case Mix:  L  Rate Cell: B  Waiver Type: EW   Emergency Contact:Chivo Oliveira  [place] Phone: [phone]  Relation: Relative Language:  Maldivian  :  CM speaks Maldivian   Health Care Agent/POA:  None Advanced Directives/Living Will:  None   Primary Care Clinic/Phone/Fax:  [facility]/(p) [phone], (f) [phone] Primary Dx:  Type 2 DM(E11.9)   Secondary Dx:  Degenerative disc disease (M51.36), Sciatica (M54.30)   Primary Physician:  Darrin Hernandez   Height:  6'1.5  Weight:  189 lbs   Specialty Physician:  Donny Saleh   Audiologist:  N/A   Eye Care Provider:  Dr. Deloris Manriquezom: [facility] [phone] Dental Care Provider:    [facility]:  [facility] [phone] or [phone]   Other:        [facility] [employer] CURRENT SERVICES SUMMARY  Equipment owned/DME history:   SERVICE TYPE/PROVIDER NAME/PHONE AUTH DATE FREQUENCY Units OR $ Amt DESCRIPTION   Medical Transportation: [employer] [phone]  Fax:  04/01/18-  03/31/19 as needed N/A    Homemaking: [facility] [phone]    Fax:  04/01/18-  02/28/19 weekly 20 units/week Homemaking                                   Past Medical History:   Diagnosis Date     Arthritis      Controlled type 2 diabetes mellitus without complication (H) 5/23/2013     Diabetes mellitus (H)      Hyperlipidemia 1/7/2013    Problem list name updated by automated process. Provider to review     Hypertension      History reviewed. No pertinent surgical history.  Current Outpatient Prescriptions   Medication Sig Dispense Refill     acetaminophen (TYLENOL) 500 MG tablet Take 1-2 tablets (500-1,000 mg) by mouth every 6 hours as needed 100 tablet 11     aspirin 81 MG chewable tablet Take 1 tablet (81 mg) by mouth daily 108 tablet 3     atorvastatin (LIPITOR) 10 MG tablet Take 1 tablet (10 mg) by mouth daily 30 tablet 11     baclofen (LIORESAL) 10 MG tablet Take 1/2 tab (5 mg) three times daily  As  Needed for pain 90 tablet 0     blood glucose monitoring (MIKHAIL CONTOUR MONITOR) meter device kit Use to test blood sugars TWICE DAILY  times daily or as directed. 1 kit 0     blood glucose monitoring (MIKHAIL CONTOUR NEXT) test strip 1 strip by In Vitro route daily 100 each 11     blood glucose monitoring (MIKHAIL MICROLET) lancets 1 each 2 times daily 1 Box prn     Blood Pressure Monitoring (WRIST BLOOD PRESSURE MONITOR) MISC 1 each 2 times daily as needed 1 each 0     cholecalciferol (VITAMIN D-1000 MAX ST) 1000 UNITS TABS Take 3 tablets by mouth daily 90 tablet 11     finasteride (PROSCAR) 5 MG tablet Take 1 tablet (5 mg) by mouth daily 90 tablet 3     fluticasone (FLONASE) 50 MCG/ACT spray Spray 1-2 sprays into  both nostrils daily 16 g 11     glimepiride (AMARYL) 1 MG tablet Take 1 tablet (1 mg) by mouth every morning (before breakfast) 30 tablet 11     ketotifen (ZADITOR) 0.025 % SOLN ophthalmic solution Place 1 drop into both eyes 2 times daily 1 Bottle 11     levocetirizine (XYZAL) 5 MG tablet Take 1 tablet (5 mg) by mouth every evening 30 tablet 11     lidocaine-prilocaine (EMLA) cream Apply topically as needed for moderate pain 30 g 3     lisinopril (PRINIVIL/ZESTRIL) 2.5 MG tablet Take 1 tablet (2.5 mg) by mouth daily 30 tablet 11     meloxicam (MOBIC) 15 MG tablet Take 1 tablet (15 mg) by mouth daily 20 tablet 1     metFORMIN (GLUCOPHAGE-XR) 500 MG 24 hr tablet TAKE 1 TABLET BY MOUTH TWICE DAILY WITH MEALS 60 tablet 11     ranitidine (ZANTAC) 150 MG tablet Take 1 tablet (150 mg) by mouth 2 times daily 60 tablet 11     tamsulosin (FLOMAX) 0.4 MG capsule Take 1 capsule (0.4 mg) by mouth daily 90 capsule 3     Trolamine Salicylate (ASPERCREME) 10 % LOTN Externally apply topically 4 times daily as needed 120 mL 11     OTC products: None, except as noted above    No Known Allergies   Latex Allergy: NO    Social History   Substance Use Topics     Smoking status: Never Smoker     Smokeless tobacco: Never Used     Alcohol use No     History   Drug Use No       REVIEW OF SYSTEMS:   CONSTITUTIONAL: NEGATIVE for fever, chills, change in weight  INTEGUMENTARY/SKIN: NEGATIVE for worrisome rashes, moles or lesions  EYES: NEGATIVE for vision changes or irritation  ENT/MOUTH: NEGATIVE for ear, mouth and throat problems  RESP: NEGATIVE for significant cough or SOB  BREAST: NEGATIVE for masses, tenderness or discharge  CV: NEGATIVE for chest pain, palpitations or peripheral edema  GI: NEGATIVE for nausea, abdominal pain, heartburn, or change in bowel habits  : NEGATIVE for frequency, dysuria, or hematuria  MUSCULOSKELETAL: NEGATIVE for significant arthralgias or myalgia  NEURO: NEGATIVE for weakness, dizziness or  paresthesias  ENDOCRINE: NEGATIVE for temperature intolerance, skin/hair changes  HEME: NEGATIVE for bleeding problems  PSYCHIATRIC: NEGATIVE for changes in mood or affect    EXAM:   /64 (Cuff Size: Adult Regular)  Pulse 56  Temp 96.3  F (35.7  C) (Tympanic)  Resp 16  Wt 182 lb (82.6 kg)  SpO2 100%  BMI 23.69 kg/m2  GENERAL APPEARANCE: healthy, alert and no distress  HENT: ear canals and TM's normal and nose and mouth without ulcers or lesions  RESP: lungs clear to auscultation - no rales, rhonchi or wheezes  CV: regular rate and rhythm, normal S1 S2, no S3 or S4 and no murmur, click or rub   ABDOMEN: soft, nontender, no HSM or masses and bowel sounds normal  MS: extremities normal- no gross deformities noted, decreased range of motion R knee   SKIN: no suspicious lesions or rashes  NEURO: Normal strength and tone, sensory exam grossly normal, mentation intact and speech normal    DIAGNOSTICS:   EKG: appears normal, NSR, normal axis, normal intervals, no acute ST/T changes c/w ischemia, no LVH by voltage criteria, unchanged from previous tracings  Chest XRay WNL  BMP, CBC reviewed.       IMPRESSION:     The proposed surgical procedure is considered INTERMEDIATE risk.    REVISED CARDIAC RISK INDEX  The patient has the following serious cardiovascular risks for perioperative complications such as (MI, PE, VFib and 3  AV Block):  No serious cardiac risks  INTERPRETATION: 2 risks: Class III (moderate risk - 6.6% complication rate)    The patient has the following additional risks for perioperative complications:  No identified additional risks      ICD-10-CM    1. Preop general physical exam Z01.818 XR Chest 2 Views     EKG 12-lead complete w/read - Clinics     CBC with platelets     Basic metabolic panel  (Ca, Cl, CO2, Creat, Gluc, K, Na, BUN)   2. Primary osteoarthritis of right knee M17.11    3. Controlled type 2 diabetes mellitus without complication, without long-term current use of insulin (H) E11.9     4. Type 2 diabetes mellitus with stage 1 chronic kidney disease, without long-term current use of insulin (H) E11.22     N18.1        RECOMMENDATIONS:   APPROVAL GIVEN to proceed with proposed procedure, without further diagnostic evaluation       Signed Electronically by: Lesli Coker PA-C    Copy of this evaluation report is provided to requesting physician.    [place] Preop Guidelines    Revised Cardiac Risk Index

## 2018-06-28 NOTE — MR AVS SNAPSHOT
After Visit Summary   6/28/2018    Chivo Urias    MRN: [id number]           Patient Information     Date Of Birth          1945        Visit Information        Provider Department      6/28/2018 7:40 AM Lesli Coker PA-C; [employer] L[employer]UAGE [employer] [facility]        Today's Diagnoses     Preop general physical exam    -  1    Primary osteoarthritis of right knee        Controlled type 2 diabetes mellitus without complication, without long-term current use of insulin (H)        Type 2 diabetes mellitus with stage 1 chronic kidney disease, without long-term current use of insulin (H)          Care Instructions      Before Your Surgery      Call your surgeon if there is any change in your health. This includes signs of a cold or flu (such as a sore throat, runny nose, cough, rash or fever).    Do not smoke, drink alcohol or take over the counter medicine (unless your surgeon or primary care doctor tells you to) for the 24 hours before and after surgery.    If you take prescribed drugs: Follow your doctor s orders about which medicines to take and which to stop until after surgery.    Eating and drinking prior to surgery: follow the instructions from your surgeon    Take a shower or bath the night before surgery. Use the soap your surgeon gave you to gently clean your skin. If you do not have soap from your surgeon, use your regular soap. Do not shave or scrub the surgery site.  Wear clean pajamas and have clean sheets on your bed.           Follow-ups after your visit        Your next 10 appointments already scheduled     Jul 30, 2018  7:30 AM CDT   Pre-Op physical with Darrin Hernandez MD   [facility]    [address]  [address]  [facility] [address] [phone]              Who to contact     If you have questions or need follow up information  about today's clinic visit or your schedule please contact [facility] directly at [phone].  Normal or non-critical lab and imaging results will be communicated to you by MyChart, letter or phone within 4 business days after the clinic has received the results. If you do not hear from us within 7 days, please contact the clinic through MyChart or phone. If you have a critical or abnormal lab result, we will notify you by phone as soon as possible.  Submit refill requests through [employer] or call your pharmacy and they will forward the refill request to us. Please allow 3 business days for your refill to be completed.          Additional Information About Your Visit        Care EveryWhere ID     This is your Care EveryWhere ID. This could be used by other organizations to access your [place] medical records  [id number]        Your Vitals Were     Pulse Temperature Respirations Pulse Oximetry BMI (Body Mass Index)       56 96.3  F (35.7  C) (Tympanic) 16 100% 23.69 kg/m2        Blood Pressure from Last 3 Encounters:   06/28/18 112/64   12/19/17 124/72   12/08/17 138/72    Weight from Last 3 Encounters:   06/28/18 182 lb (82.6 kg)   12/19/17 189 lb 8 oz (86 kg)   12/08/17 192 lb (87.1 kg)              We Performed the Following     Basic metabolic panel  (Ca, Cl, CO2, Creat, Gluc, K, Na, BUN)     CBC with platelets     EKG 12-lead complete w/read - Clinics        Primary Care Provider Office Phone # Fax #    Darrin Marilou Hernandez [phone] [address] [facility] [address]        Equal Access to Services     NAYELY WEBB : Hadii merry Garcia, waaxda luqadaha, qaybta kaalmundo gee. So [facility] [phone].    ATENCIÓN: Si habla español, tiene a pizano disposición servicios gratuitos de asistencia lingüística. Lenny al [phone].    We comply with applicable federal civil rights laws and  [place] laws. We do not discriminate on the basis of race, color, national origin, age, disability, sex, sexual orientation, or gender identity.            Thank you!     Thank you for choosing [facility]  for your care. Our goal is always to provide you with excellent care. Hearing back from our patients is one way we can continue to improve our services. Please take a few minutes to complete the written survey that you may receive in the mail after your visit with us. Thank you!             Your Updated Medication List - Protect others around you: Learn how to safely use, store and throw away your medicines at www.disposemymeds.org.          This list is accurate as of 6/28/18  3:30 PM.  Always use your most recent med list.                   Brand Name Dispense Instructions for use Diagnosis    acetaminophen 500 MG tablet    TYLENOL    100 tablet    Take 1-2 tablets (500-1,000 mg) by mouth every 6 hours as needed    Foot pain, left       aspirin 81 MG chewable tablet     108 tablet    Take 1 tablet (81 mg) by mouth daily    Essential hypertension with goal blood pressure less than 130/80, Hyperlipidemia with target LDL less than 100       atorvastatin 10 MG tablet    LIPITOR    30 tablet    Take 1 tablet (10 mg) by mouth daily    Pure hypercholesterolemia       baclofen 10 MG tablet    LIORESAL    90 tablet    Take 1/2 tab (5 mg) three times daily  As  Needed for pain    Muscle pain       blood glucose monitoring lancets     1 Box    1 each 2 times daily    Diabetic polyneuropathy associated with diabetes mellitus due to underlying condition (H)       blood glucose monitoring meter device kit     1 kit    Use to test blood sugars TWICE DAILY  times daily or as directed.    Type 2 diabetes mellitus without complication, without long-term current use of insulin (H)       blood glucose monitoring test strip    MIKHAIL CONTOUR NEXT    100 each    1 strip by In Vitro route daily     Diabetic polyneuropathy associated with diabetes mellitus due to underlying condition (H)       cholecalciferol 1000 units Tabs    VITAMIN D-1000 MAX ST    90 tablet    Take 3 tablets by mouth daily    Screening for diabetic peripheral neuropathy, Cramp of both lower extremities       finasteride 5 MG tablet    PROSCAR    90 tablet    Take 1 tablet (5 mg) by mouth daily    Benign non-nodular prostatic hyperplasia without lower urinary tract symptoms       fluticasone 50 MCG/ACT spray    FLONASE    16 g    Spray 1-2 sprays into both nostrils daily    Seasonal allergic rhinitis due to pollen       glimepiride 1 MG tablet    AMARYL    30 tablet    Take 1 tablet (1 mg) by mouth every morning (before breakfast)    Controlled type 2 diabetes mellitus without complication, without long-term current use of insulin (H)       ketotifen 0.025 % Soln ophthalmic solution    ZADITOR    1 Bottle    Place 1 drop into both eyes 2 times daily    Seasonal allergic conjunctivitis       levocetirizine 5 MG tablet    XYZAL    30 tablet    Take 1 tablet (5 mg) by mouth every evening    Seasonal allergic rhinitis due to pollen       lidocaine-prilocaine cream    EMLA    30 g    Apply topically as needed for moderate pain    Acute pain of right knee       lisinopril 2.5 MG tablet    PRINIVIL/Zestril    30 tablet    Take 1 tablet (2.5 mg) by mouth daily    Type 2 diabetes mellitus with stage 1 chronic kidney disease, without long-term current use of insulin (H)       meloxicam 15 MG tablet    MOBIC    20 tablet    Take 1 tablet (15 mg) by mouth daily    Acute pain of right knee       metFORMIN 500 MG 24 hr tablet    GLUCOPHAGE-XR    60 tablet    TAKE 1 TABLET BY MOUTH TWICE DAILY WITH MEALS    Controlled type 2 diabetes mellitus without complication, without long-term current use of insulin (H)       ranitidine 150 MG tablet    ZANTAC    60 tablet    Take 1 tablet (150 mg) by mouth 2 times daily    Gastroesophageal reflux disease without  esophagitis       tamsulosin 0.4 MG capsule    FLOMAX    90 capsule    Take 1 capsule (0.4 mg) by mouth daily    Benign non-nodular prostatic hyperplasia without lower urinary tract symptoms       Trolamine Salicylate 10 % Lotn    ASPERCREME    120 mL    Externally apply topically 4 times daily as needed    Knee bursitis, right       Wrist Blood Pressure Monitor Misc     1 each    1 each 2 times daily as needed    Benign essential hypertension

## 2018-07-02 ENCOUNTER — ANESTHESIA - HEALTHEAST (OUTPATIENT)
Dept: SURGERY | Facility: CLINIC | Age: 73
End: 2018-07-02

## 2018-07-03 ENCOUNTER — SURGERY - HEALTHEAST (OUTPATIENT)
Dept: SURGERY | Facility: CLINIC | Age: 73
End: 2018-07-03

## 2018-07-03 ASSESSMENT — MIFFLIN-ST. JEOR: SCORE: 1633.9

## 2018-07-09 ENCOUNTER — PATIENT OUTREACH (OUTPATIENT)
Dept: GERIATRIC MEDICINE | Facility: CLINIC | Age: 73
End: 2018-07-09

## 2018-07-09 NOTE — PROGRESS NOTES
[facility] Care Coordination Contact    Client had a total knee replacement on 7/3/18 at [facility] and was discharged to home on 7/6/18. Client is temporarily staying at his family's home in [place]. He reports doing well and independent with all ADLs needs. Currently receiving outpatient PT and is using CPM. Client is using standard walker and denies needing any new equipments. Has f/u appointments with his surgeon and PCP.  Encouraged client to call CM with questions or concerns.    Laura Romo RN BSN PHN  [facility]   [phone]  Fax: [phone]

## 2018-07-19 ENCOUNTER — TRANSFERRED RECORDS (OUTPATIENT)
Dept: HEALTH INFORMATION MANAGEMENT | Facility: CLINIC | Age: 73
End: 2018-07-19

## 2018-07-23 ENCOUNTER — OFFICE VISIT (OUTPATIENT)
Dept: FAMILY MEDICINE | Facility: CLINIC | Age: 73
End: 2018-07-23
Payer: COMMERCIAL

## 2018-07-23 VITALS
TEMPERATURE: 98.3 F | DIASTOLIC BLOOD PRESSURE: 72 MMHG | WEIGHT: 175 LBS | BODY MASS INDEX: 22.78 KG/M2 | HEART RATE: 72 BPM | OXYGEN SATURATION: 98 % | SYSTOLIC BLOOD PRESSURE: 124 MMHG

## 2018-07-23 DIAGNOSIS — M79.672 FOOT PAIN, LEFT: ICD-10-CM

## 2018-07-23 DIAGNOSIS — Z13.89 SCREENING FOR DIABETIC PERIPHERAL NEUROPATHY: ICD-10-CM

## 2018-07-23 DIAGNOSIS — E11.22 TYPE 2 DIABETES MELLITUS WITH STAGE 1 CHRONIC KIDNEY DISEASE, WITHOUT LONG-TERM CURRENT USE OF INSULIN (H): Chronic | ICD-10-CM

## 2018-07-23 DIAGNOSIS — N40.0 BENIGN NON-NODULAR PROSTATIC HYPERPLASIA WITHOUT LOWER URINARY TRACT SYMPTOMS: ICD-10-CM

## 2018-07-23 DIAGNOSIS — E78.00 PURE HYPERCHOLESTEROLEMIA: ICD-10-CM

## 2018-07-23 DIAGNOSIS — E78.5 HYPERLIPIDEMIA WITH TARGET LDL LESS THAN 100: Chronic | ICD-10-CM

## 2018-07-23 DIAGNOSIS — E08.42 DIABETIC POLYNEUROPATHY ASSOCIATED WITH DIABETES MELLITUS DUE TO UNDERLYING CONDITION (H): ICD-10-CM

## 2018-07-23 DIAGNOSIS — E11.9 CONTROLLED TYPE 2 DIABETES MELLITUS WITHOUT COMPLICATION, WITHOUT LONG-TERM CURRENT USE OF INSULIN (H): Chronic | ICD-10-CM

## 2018-07-23 DIAGNOSIS — K59.03 DRUG-INDUCED CONSTIPATION: ICD-10-CM

## 2018-07-23 DIAGNOSIS — Z96.651 HX OF TOTAL KNEE ARTHROPLASTY, RIGHT: Primary | ICD-10-CM

## 2018-07-23 DIAGNOSIS — R25.2 CRAMP OF BOTH LOWER EXTREMITIES: ICD-10-CM

## 2018-07-23 DIAGNOSIS — I10 ESSENTIAL HYPERTENSION WITH GOAL BLOOD PRESSURE LESS THAN 130/80: Chronic | ICD-10-CM

## 2018-07-23 DIAGNOSIS — N18.1 TYPE 2 DIABETES MELLITUS WITH STAGE 1 CHRONIC KIDNEY DISEASE, WITHOUT LONG-TERM CURRENT USE OF INSULIN (H): Chronic | ICD-10-CM

## 2018-07-23 PROCEDURE — 99214 OFFICE O/P EST MOD 30 MIN: CPT | Performed by: FAMILY MEDICINE

## 2018-07-23 RX ORDER — FINASTERIDE 5 MG/1
5 TABLET, FILM COATED ORAL DAILY
Qty: 90 TABLET | Refills: 3 | Status: SHIPPED | OUTPATIENT
Start: 2018-07-23 | End: 2019-05-03

## 2018-07-23 RX ORDER — ACETAMINOPHEN 500 MG
500-1000 TABLET ORAL EVERY 6 HOURS PRN
Qty: 100 TABLET | Refills: 11 | Status: SHIPPED | OUTPATIENT
Start: 2018-07-23 | End: 2019-08-13

## 2018-07-23 RX ORDER — ATORVASTATIN CALCIUM 10 MG/1
10 TABLET, FILM COATED ORAL DAILY
Qty: 30 TABLET | Refills: 11 | Status: SHIPPED | OUTPATIENT
Start: 2018-07-23 | End: 2019-05-03

## 2018-07-23 RX ORDER — AMOXICILLIN 250 MG
2 CAPSULE ORAL 2 TIMES DAILY
Qty: 60 TABLET | Refills: 1 | Status: SHIPPED | OUTPATIENT
Start: 2018-07-23 | End: 2019-05-03

## 2018-07-23 RX ORDER — GLIMEPIRIDE 1 MG/1
1 TABLET ORAL
Qty: 30 TABLET | Refills: 11 | Status: SHIPPED | OUTPATIENT
Start: 2018-07-23 | End: 2019-05-03

## 2018-07-23 RX ORDER — TAMSULOSIN HYDROCHLORIDE 0.4 MG/1
0.4 CAPSULE ORAL DAILY
Qty: 90 CAPSULE | Refills: 3 | Status: SHIPPED | OUTPATIENT
Start: 2018-07-23 | End: 2019-05-03

## 2018-07-23 RX ORDER — LISINOPRIL 2.5 MG/1
2.5 TABLET ORAL DAILY
Qty: 30 TABLET | Refills: 11 | Status: SHIPPED | OUTPATIENT
Start: 2018-07-23 | End: 2019-05-03

## 2018-07-23 RX ORDER — LIDOCAINE 50 MG/G
PATCH TOPICAL
Qty: 30 PATCH | Refills: 3 | Status: SHIPPED | OUTPATIENT
Start: 2018-07-23 | End: 2019-05-03

## 2018-07-23 RX ORDER — ASPIRIN 81 MG/1
81 TABLET, CHEWABLE ORAL DAILY
Qty: 108 TABLET | Refills: 3 | Status: SHIPPED | OUTPATIENT
Start: 2018-07-23 | End: 2019-08-13

## 2018-07-23 RX ORDER — METFORMIN HCL 500 MG
TABLET, EXTENDED RELEASE 24 HR ORAL
Qty: 60 TABLET | Refills: 11 | Status: SHIPPED | OUTPATIENT
Start: 2018-07-23 | End: 2019-05-03

## 2018-07-23 NOTE — PROGRESS NOTES
SUBJECTIVE:   Chivo Urias is a 72 year old male who presents to clinic today for the following health issues:          Hospital Follow-up Visit:    Hospital/Nursing Home/ Rehab Facility: [facility]  Date of Admission: 7-3-18  Date of Discharge: 7-6-18  Reason(s) for Admission: KNEE REPLACEMENT            Problems taking medications regularly:  None       Medication changes since discharge: None       Problems adhering to non-medication therapy:  None    Summary of hospitalization:  [facility] discharge summary reviewed  Diagnostic Tests/Treatments reviewed.  Follow up needed: none  Other Healthcare Providers Involved in Patient s Care:         None  Update since discharge: improved.     Post Discharge Medication Reconciliation: discharge medications reconciled, continue medications without change.  Plan of care communicated with patient and      Coding guidelines for this visit:  Type of Medical   Decision Making Face-to-Face Visit       within 7 Days of discharge Face-to-Face Visit        within 14 days of discharge   Moderate Complexity [id number]   High Complexity [id number]             .EDUARDA LAMBERT MD .7/23/2018 9:06 PM .July 23, 2018        Chivo Urias is a 72 year old male who is who presents with RIGHT TOTAL KNEE ARTHROPLASTY     HAVING SIGNIFICANT PAIN     Onset :  3RD WEEK      Severity: SEVERE PAIN NOTED       Home treatments  PAIN MEDICATIONS OXYCODONE PER ORTHOPEDICS     NO EVIDENCE PULMONARY EMBOLI AND WOUND LOOKS GOOD      Additional Symptoms: INCISIONAL PAIN SEVERE AND DECREASE RANGE OF MOTION OF RIGHT TOTAL KNEE ARTHROPLASTY      Course  SLOW IMPROVEMENT       (Z96.651) Hx of total knee arthroplasty, right  (primary encounter diagnosis)  Comment:    Plan: lidocaine (LIDODERM) 5 % Patch         ON AM OFF HOUR OF SLEEP     (M79.672) Foot pain, left  Comment:    Plan: acetaminophen (TYLENOL) 500 MG tablet         REFILLED PER REQUEST     (I10) Essential  hypertension with goal blood pressure less than 130/80  Comment:    Plan: aspirin 81 MG chewable tablet         SIDE EFFECTS BENEFITS AND RISKS DISCUSSED    TREATMENT PROGNOSIS BENEFITS AND RISKS DISCUSSED     (E78.5) Hyperlipidemia with target LDL less than 100  Comment:    Plan: aspirin 81 MG chewable tablet         ATORVASTATIN 10MG DAILY     (E    (E08.42) Diabetic polyneuropathy associated with diabetes mellitus due to underlying condition (H)  Comment:    Plan: blood glucose monitoring (MIKHAIL MICROLET)         lancets, blood glucose monitoring (MIKHAIL         CONTOUR NEXT) test strip         REFILLED PER REQUEST     (Z13.89) Screening for diabetic peripheral neuropathy  Comment:    Plan: cholecalciferol (VITAMIN D-1000 MAX ST) 1000         units TABS         NORMAL CIRCULATION MOTION AND SENSATION AND MONOFILAMENT TESTING     (R25.2) Cramp of both lower extremities  Comment:    Plan: cholecalciferol (VITAMIN D-1000 MAX ST) 1000         units TABS             (N40.0) Benign non-nodular prostatic hyperplasia without lower urinary tract symptoms  Comment:    Plan: finasteride (PROSCAR) 5 MG tablet, tamsulosin         (FLOMAX) 0.4 MG capsule         BENIGN PROSTATIC HYPERTROPHY SYMPTOMS     (E11.9) Controlled type 2 diabetes mellitus without complication, without long-term current use of insulin (H)  Comment:  CONTROLLED WELL   Plan: glimepiride (AMARYL) 1 MG tablet, metFORMIN         (GLUCOPHAGE-XR) 500 MG 24 hr tablet             (E11.22,  N18.1) Type 2 diabetes mellitus with stage 1 chronic kidney disease, without long-term current use of insulin (H)  Comment:    Plan: lisinopril (PRINIVIL/ZESTRIL) 2.5 MG tablet             (K59.03) Drug-induced constipation  Comment:    Plan: senna-docusate (SENOKOT-S;PERICOLACE) 8.6-50 MG        per tablet         1-2 TABLETS TWICE DAILY                 Topic Date Due     A1C Q6 MO  03/29/2018               .  Current Outpatient Prescriptions   Medication Sig Dispense Refill      acetaminophen (TYLENOL) 500 MG tablet Take 1-2 tablets (500-1,000 mg) by mouth every 6 hours as needed 100 tablet 11     aspirin 81 MG chewable tablet Take 1 tablet (81 mg) by mouth daily 108 tablet 3     atorvastatin (LIPITOR) 10 MG tablet Take 1 tablet (10 mg) by mouth daily 30 tablet 11     blood glucose monitoring (MIKHAIL CONTOUR MONITOR) meter device kit Use to test blood sugars TWICE DAILY  times daily or as directed. 1 kit 0     blood glucose monitoring (MIKHAIL CONTOUR NEXT) test strip 1 strip by In Vitro route daily 100 each 11     blood glucose monitoring (MIKHAIL MICROLET) lancets 1 each 2 times daily 100 each 11     Blood Pressure Monitoring (WRIST BLOOD PRESSURE MONITOR) MISC 1 each 2 times daily as needed 1 each 0     cholecalciferol (VITAMIN D-1000 MAX ST) 1000 units TABS Take 3 tablets by mouth daily 90 tablet 11     finasteride (PROSCAR) 5 MG tablet Take 1 tablet (5 mg) by mouth daily 90 tablet 3     glimepiride (AMARYL) 1 MG tablet Take 1 tablet (1 mg) by mouth every morning (before breakfast) 30 tablet 11     lidocaine (LIDODERM) 5 % Patch On am off hour of sleep 30 patch 3     lisinopril (PRINIVIL/ZESTRIL) 2.5 MG tablet Take 1 tablet (2.5 mg) by mouth daily 30 tablet 11     metFORMIN (GLUCOPHAGE-XR) 500 MG 24 hr tablet TAKE 1 TABLET BY MOUTH TWICE DAILY WITH MEALS 60 tablet 11     senna-docusate (SENOKOT-S;PERICOLACE) 8.6-50 MG per tablet Take 2 tablets by mouth 2 times daily 60 tablet 1     tamsulosin (FLOMAX) 0.4 MG capsule Take 1 capsule (0.4 mg) by mouth daily 90 capsule 3     [DISCONTINUED] atorvastatin (LIPITOR) 10 MG tablet Take 1 tablet (10 mg) by mouth daily 30 tablet 11     [DISCONTINUED] glimepiride (AMARYL) 1 MG tablet Take 1 tablet (1 mg) by mouth every morning (before breakfast) 30 tablet 11     [DISCONTINUED] lisinopril (PRINIVIL/ZESTRIL) 2.5 MG tablet Take 1 tablet (2.5 mg) by mouth daily 30 tablet 11     [DISCONTINUED] metFORMIN (GLUCOPHAGE-XR) 500 MG 24 hr tablet TAKE 1 TABLET BY  MOUTH TWICE DAILY WITH MEALS 60 tablet 11              No Known Allergies      Immunization History   Administered Date(s) Administered     Pneumococcal 23 valent 03/05/2013     Tetanus 01/01/2010               reports that he does not drink alcohol.          reports that he does not use illicit drugs.        family history includes Family History Negative in his father and mother.        indicated that his mother is alive. He indicated that his father is alive.          has no past surgical history on file.         reports that he does not engage in sexual activity.    .  Pediatric History   Patient Guardian Status     Not on file.     Other Topics Concern     Parent/Sibling W/ Cabg, Mi Or Angioplasty Before 65f 55m? No     Social History Narrative    Surgical History  Return To Top     Status Surgery Time Frame Comment Record Date     Inactive  NONE      11/14/2007          --------------------------------------------------------------------------------    Food Allergy  Return To Top     Allergen Reaction Comment Record Date     * No known food allergies      11/14/2007          --------------------------------------------------------------------------------    Drug Allergy  Return To Top     Allergen Reaction Comment Record Date     * No known drug allergies      5/15/2007          --------------------------------------------------------------------------------    Environment Allergy  Return To Top     Allergen Reaction Comment Record Date     * No known environmental allergies      11/14/2007          --------------------------------------------------------------------------------    Social History  Return To Top     Question Answer Comment Record Date     Marital status      11/14/2007      Advance Directive or Living Will  No    5/18/2010      Emotional Abuse  No    7/1/2011      Exercise  Yes SOMETIMES  walks alot.  11/14/2007      Caffeine  Yes  Tea  1/11/2008      Physical Abuse  No    7/1/2011       Rosalbas  Yes    11/14/2007      Sexual Abuse  No     7/1/2011      Breast/Testicle Self Check  Yes    11/14/2007      Number of children  7  4 GIRLS AND 3 BOYS  7/17/2007      Living arrangements  Apartment/Condo    11/14/2007      Number of children in household  0    11/14/2007      Number of adults in household  1    11/14/2007      Education level  High School Graduate    11/14/2007      Employment  Currently unemployed    11/14/2007      Tobacco history  Has never smoked or chewed tobacco    8/29/2008      Alcohol history  Never drinks alcohol    11/14/2007      Has the patient ever used illegal drugs?  Has never used illegal drugs    7/1/2011      Has the patient used marijuana?  No    7/1/2011      Has the patient used cocaine?  No    7/1/2011          --------------------------------------------------------------------------------    Medical History Return To Top     Status Diagnosis Time Frame Comment Record Date     Active (729.1) - C - Myalgia      8/11/2011      Active (726.79) - C - Sub-Achilles bursitis      8/11/2011      Active (700) - C - Corn/callus    o  7/1/2011      Active (272.4) - C - Hyperlipidemia      7/1/2011      Active (V81.2) - C - SCREEN-CARDIOVASC NEC      6/21/2011      Active (V82.9) - C - Screening, vitamin d deficiency      4/8/2011      Active (V77.91) - C - Screening, lipids      4/8/2011      Active (V76.51) - C - Screening, colon      4/8/2011      Active (780.79) - C - Fatigue      5/18/2010      Active V76.44 Screening, prostate      10/7/2009      Active (780.79) - C - Fatigue      10/7/2009      Active 477.9 Rhinitis, allergic, cause unspecified      8/29/2008      Active (782.0) - C - Numbness    RIGHT LEG  6/19/2008      Active 780.79 Fatigue      2/20/2008      Active (784.0) - C - Headache, unspec.      2/1/2008      Active 724.3 Sciatica    chronic, with worsening weakness and sensory changes in S1 distribution  1/11/2008      Active 608.4 MALE GEN INFLAM DIS OT       11/20/2007      Active 608.9 MALE GENITAL DIS UNSPEC      11/14/2007      Active V78.3 Screening, HGB      11/14/2007      Active 355.9 MONONEURITIS UNSPEC      7/17/2007      Active 365.9 UNSPECIFIED GLAUCOMA      5/16/2007      Active (529.46) - C - Knee pain      5/16/2007      Active (729.5) - C - limb pain    both legs muscle aches  5/16/2007      Active (724.2) - C - Low back pain      5/16/2007      Inactive  (780.79) - C - Fatigue    IMPROVED   10/22/2009      Inactive  (780.79) - C - Fatigue    IMPROVED  6/19/2008      Inactive  782.0 Numbness      5/16/2007          ----------------------------------------------------        --------------------------------------------------------------------------------    Family History  Return To Top     Status Relationship Disease Comment Record Date     Alive Mother      11/14/2007      Alive Father      11/14/2007          --------------------------------------------------------------------------------                       reports that he has never smoked. He has never used smokeless tobacco.        Medical, social, surgical, and family histories reviewed.        Labs reviewed in EPIC  Patient Active Problem List   Diagnosis     Health Care Home     Myalgia and myositis     Allergic rhinitis     Sciatica     Glaucoma     Foot pain, left     Controlled type 2 diabetes mellitus without complication (H)     Hyperlipidemia with target LDL less than 100     Vitamin D insufficiency     DDD (degenerative disc disease), lumbar     Stage 1 chronic renal impairment associated with type 2 diabetes mellitus (H)     Comprehensive diabetic foot examination, type 2 DM, encounter for (H)     Low HDL (under 40)     Hypertension goal BP (blood pressure) < 130/80     CKD (chronic kidney disease) stage 2, GFR 60-89 ml/min     Right hip pain     Type 2 diabetes, HbA1C goal < 8% (H)     ACP (advance care planning)     Enthesopathy of right hip region         History reviewed. No  pertinent surgical history.    Social History   Substance Use Topics     Smoking status: Never Smoker     Smokeless tobacco: Never Used     Alcohol use No       Family History   Problem Relation Age of Onset     Family History Negative Mother      Family History Negative Father              Current Outpatient Prescriptions   Medication Sig Dispense Refill     acetaminophen (TYLENOL) 500 MG tablet Take 1-2 tablets (500-1,000 mg) by mouth every 6 hours as needed 100 tablet 11     aspirin 81 MG chewable tablet Take 1 tablet (81 mg) by mouth daily 108 tablet 3     atorvastatin (LIPITOR) 10 MG tablet Take 1 tablet (10 mg) by mouth daily 30 tablet 11     blood glucose monitoring (MIKHAIL CONTOUR MONITOR) meter device kit Use to test blood sugars TWICE DAILY  times daily or as directed. 1 kit 0     blood glucose monitoring (MIKHAIL CONTOUR NEXT) test strip 1 strip by In Vitro route daily 100 each 11     blood glucose monitoring (MIKHAIL MICROLET) lancets 1 each 2 times daily 100 each 11     Blood Pressure Monitoring (WRIST BLOOD PRESSURE MONITOR) MISC 1 each 2 times daily as needed 1 each 0     cholecalciferol (VITAMIN D-1000 MAX ST) 1000 units TABS Take 3 tablets by mouth daily 90 tablet 11     finasteride (PROSCAR) 5 MG tablet Take 1 tablet (5 mg) by mouth daily 90 tablet 3     glimepiride (AMARYL) 1 MG tablet Take 1 tablet (1 mg) by mouth every morning (before breakfast) 30 tablet 11     lidocaine (LIDODERM) 5 % Patch On am off hour of sleep 30 patch 3     lisinopril (PRINIVIL/ZESTRIL) 2.5 MG tablet Take 1 tablet (2.5 mg) by mouth daily 30 tablet 11     metFORMIN (GLUCOPHAGE-XR) 500 MG 24 hr tablet TAKE 1 TABLET BY MOUTH TWICE DAILY WITH MEALS 60 tablet 11     senna-docusate (SENOKOT-S;PERICOLACE) 8.6-50 MG per tablet Take 2 tablets by mouth 2 times daily 60 tablet 1     tamsulosin (FLOMAX) 0.4 MG capsule Take 1 capsule (0.4 mg) by mouth daily 90 capsule 3     [DISCONTINUED] atorvastatin (LIPITOR) 10 MG tablet Take 1 tablet  (10 mg) by mouth daily 30 tablet 11     [DISCONTINUED] glimepiride (AMARYL) 1 MG tablet Take 1 tablet (1 mg) by mouth every morning (before breakfast) 30 tablet 11     [DISCONTINUED] lisinopril (PRINIVIL/ZESTRIL) 2.5 MG tablet Take 1 tablet (2.5 mg) by mouth daily 30 tablet 11     [DISCONTINUED] metFORMIN (GLUCOPHAGE-XR) 500 MG 24 hr tablet TAKE 1 TABLET BY MOUTH TWICE DAILY WITH MEALS 60 tablet 11           Recent Labs   Lab Test  06/28/18   0823  09/29/17   0832  06/08/17   0808  12/08/16   0920  11/10/16   1028  09/15/16   0906   03/04/14   1008   A1C   --   7.1*  7.6*   --   6.9*  6.7*   < >  6.1*   LDL   --   39   --    --   62  69   < >  111   HDL   --   40   --    --   42  45   < >  44   TRIG   --   306*   --    --   216*  131   < >  137   ALT   --   24  23   --   32  29   < >  28   CR  1.03  0.90  0.88   --   1.08  1.04   < >  1.10   GFRESTIMATED  70  83  85   --   67  70   < >  66   GFRESTBLACK  85  >90  >90   GFR Calc     --   81  85   < >  80   POTASSIUM  4.3  4.2  4.0   --   4.2  4.2   < >  4.3   TSH   --    --    --   0.99   --    --    --   1.02    < > = values in this interval not displayed.            BP Readings from Last 6 Encounters:   07/23/18 124/72   06/28/18 112/64   12/19/17 124/72   12/08/17 138/72   09/29/17 104/64   06/15/17 124/70           Wt Readings from Last 3 Encounters:   07/23/18 175 lb (79.4 kg)   06/28/18 182 lb (82.6 kg)   12/19/17 189 lb 8 oz (86 kg)                 Positive symptoms or findings indicated by bold designation:         ROS: 10 point ROS neg other than the symptoms noted above in the HPI.except  has Health Care Home; Myalgia and myositis; Allergic rhinitis; Sciatica; Glaucoma; Foot pain, left; Controlled type 2 diabetes mellitus without complication (H); Hyperlipidemia with target LDL less than 100; Vitamin D insufficiency; DDD (degenerative disc disease), lumbar; Stage 1 chronic renal impairment associated with type 2 diabetes mellitus (H);  Comprehensive diabetic foot examination, type 2 DM, encounter for (H); Low HDL (under 40); Hypertension goal BP (blood pressure) < 130/80; CKD (chronic kidney disease) stage 2, GFR 60-89 ml/min; Right hip pain; Type 2 diabetes, HbA1C goal < 8% (H); ACP (advance care planning); and Enthesopathy of right hip region on his problem list.  Review Of Systems    Skin: negative    Eyes: negative    Ears/Nose/Throat: negative    Respiratory: No shortness of breath, dyspnea on exertion, cough, or hemoptysis    Cardiovascular: negative    Gastrointestinal: negative    Genitourinary: negative    Musculoskeletal: arthritis, joint pain and RIGHT TOTAL KNEE ARTHROPLASTY     Neurologic: negative    Psychiatric: negative    Hematologic/Lymphatic/Immunologic: negative    Endocrine: nD28                 PE:  /72 (Cuff Size: Adult Large)  Pulse 72  Temp 98.3  F (36.8  C) (Tympanic)  Wt 175 lb (79.4 kg)  SpO2 98%  BMI 22.78 kg/m2 Body mass index is 22.78 kg/(m^2).        Constitutional: general appearance, well nourished, well developed, in no acute distress, well developed, appears stated age, normal body habitus,           Eyes:; The patient has normal eyelids sclerae and conjunctivae :          Ears/Nose/Throat: external ear, overall: normal appearance; external nose, overall: benign appearance, normal moujth gums and lips           Neck: thyroid, overall: normal size, normal consistency, nontender,          Respiratory:  palpation of chest, overall: normal excursion,    Clear to percussion and auscultation     NO Tachypnea    NORMAL  Color          Cardiovascular:  Good color with no peripheral edema    Regular sinus rhythm without murmur.  Physiologic heart sounds   Heart is unelarged    .     Chest/Breast: normal shape           Abdominal exam,  Liver and spleen are  unenlarged        Tenderness    Scars              Urogenital; no renal, flank or bladder  tenderness;          Lymphatic: neck nodes,     Other nodes          Musculoskeletal:  Brief ortho exam normal except:   RIGHT TOTAL KNEE ARTHROPLASTY SWOLLEN BUT NOT RED    DECREASE RANGE OF MOTION NOTED         Integument: inspection of skin, no rash, lesions; and, palpation, no induration, no tenderness.  SCAR         Neurologic mental status, overall: alert and oriented; gait, no ataxia, no unsteadiness; coordination, no tremors; cranial nerves, overall: normal motor, overall: normal bulk, tone.          Psychiatric: orientation/consciousness, overall: oriented to person, place and time; behavior/psychomotor activity, no tics, normal psychomotor activity; mood and affect, overall: normal mood and affect; appearance, overall: well-groomed, good eye contact; speech, overall: normal quality, no aphasia and normal quality, quantity, intact.        Diagnostic Test Results:  Results for orders placed or performed in visit on 06/28/18   XR Chest 2 Views    Narrative    CHEST TWO VIEWS  6/28/2018 8:48 AM     HISTORY:  Preoperative.    COMPARISON: 6/8/2017.      Impression    IMPRESSION: Chest is negative and unchanged. Lungs clear. No pleural  effusions. Heart size and pulmonary vascularity are within normal  limits.    RENETTA PENNINGTON MD           ICD-10-CM    1. Hx of total knee arthroplasty, right Z96.651 lidocaine (LIDODERM) 5 % Patch   2. Foot pain, left M79.672 acetaminophen (TYLENOL) 500 MG tablet   3. Essential hypertension with goal blood pressure less than 130/80 I10 aspirin 81 MG chewable tablet   4. Hyperlipidemia with target LDL less than 100 E78.5 aspirin 81 MG chewable tablet   5. Pure hypercholesterolemia E78.00 atorvastatin (LIPITOR) 10 MG tablet   6. Diabetic polyneuropathy associated with diabetes mellitus due to underlying condition (H) E08.42 blood glucose monitoring (MIKHAIL MICROLET) lancets     blood glucose monitoring (MIKHAIL CONTOUR NEXT) test strip   7. Screening for diabetic peripheral neuropathy Z13.89 cholecalciferol (VITAMIN D-1000 MAX ST) 1000 units  TABS   8. Cramp of both lower extremities R25.2 cholecalciferol (VITAMIN D-1000 MAX ST) 1000 units TABS   9. Benign non-nodular prostatic hyperplasia without lower urinary tract symptoms N40.0 finasteride (PROSCAR) 5 MG tablet     tamsulosin (FLOMAX) 0.4 MG capsule   10. Controlled type 2 diabetes mellitus without complication, without long-term current use of insulin (H) E11.9 glimepiride (AMARYL) 1 MG tablet     metFORMIN (GLUCOPHAGE-XR) 500 MG 24 hr tablet   11. Type 2 diabetes mellitus with stage 1 chronic kidney disease, without long-term current use of insulin (H) E11.22 lisinopril (PRINIVIL/ZESTRIL) 2.5 MG tablet    N18.1    12. Drug-induced constipation K59.03 senna-docusate (SENOKOT-S;PERICOLACE) 8.6-50 MG per tablet              .    Side effects benefits and risks thoroughly discussed. .he may come in early if unimproved or getting worse          Please drink 2 glasses of water prior to meals and walk 15-30 minutes after meals        I spent 25 MINUTES SPENT  with patient discussing the following issues   The primary encounter diagnosis was Hx of total knee arthroplasty, right. Diagnoses of Foot pain, left, Essential hypertension with goal blood pressure less than 130/80, Hyperlipidemia with target LDL less than 100, Pure hypercholesterolemia, Diabetic polyneuropathy associated with diabetes mellitus due to underlying condition (H), Screening for diabetic peripheral neuropathy, Cramp of both lower extremities, Benign non-nodular prostatic hyperplasia without lower urinary tract symptoms, Controlled type 2 diabetes mellitus without complication, without long-term current use of insulin (H), Type 2 diabetes mellitus with stage 1 chronic kidney disease, without long-term current use of insulin (H), and Drug-induced constipation were also pertinent to this visit. over half of which involved counseling and coordination of care.      Patient Instructions   (Z96.651) Hx of total knee arthroplasty, right   (primary encounter diagnosis)  Comment:    Plan: lidocaine (LIDODERM) 5 % Patch             (M79.672) Foot pain, left  Comment:    Plan: acetaminophen (TYLENOL) 500 MG tablet             (I10) Essential hypertension with goal blood pressure less than 130/80  Comment:    Plan: aspirin 81 MG chewable tablet             (E78.5) Hyperlipidemia with target LDL less than 100  Comment:    Plan: aspirin 81 MG chewable tablet             (E78.00) Pure hypercholesterolemia  Comment:    Plan: atorvastatin (LIPITOR) 10 MG tablet             (E08.42) Diabetic polyneuropathy associated with diabetes mellitus due to underlying condition (H)  Comment:    Plan: blood glucose monitoring (MIKHAIL MICROLET)         lancets, blood glucose monitoring (MIKHAIL         CONTOUR NEXT) test strip             (Z13.89) Screening for diabetic peripheral neuropathy  Comment:    Plan: cholecalciferol (VITAMIN D-1000 MAX ST) 1000         units TABS             (R25.2) Cramp of both lower extremities  Comment:    Plan: cholecalciferol (VITAMIN D-1000 MAX ST) 1000         units TABS             (N40.0) Benign non-nodular prostatic hyperplasia without lower urinary tract symptoms  Comment:    Plan: finasteride (PROSCAR) 5 MG tablet, tamsulosin         (FLOMAX) 0.4 MG capsule             (E11.9) Controlled type 2 diabetes mellitus without complication, without long-term current use of insulin (H)  Comment:    Plan: glimepiride (AMARYL) 1 MG tablet, metFORMIN         (GLUCOPHAGE-XR) 500 MG 24 hr tablet             (E11.22,  N18.1) Type 2 diabetes mellitus with stage 1 chronic kidney disease, without long-term current use of insulin (H)  Comment:    Plan: lisinopril (PRINIVIL/ZESTRIL) 2.5 MG tablet             (K59.03) Drug-induced constipation  Comment:    Plan: senna-docusate (SENOKOT-S;PERICOLACE) 8.6-50 MG        per tablet                               ALL THE ABOVE PROBLEMS ARE STABLE AND MED CHANGES AS NOTED        Diet:  MEDITERRANEAN DIET AND  DIABETES         Exercise:  RIGHT TOTAL KNEE ARTHROPLASTY EXERCISES     LEFT KNEE PAIN EXERCISE  LOWER BACK PAIN AND WALKING   Exercises Range of motion, balance, isometric, and strengthening exercises 30 repetitions twice daily of involved joints            .EDUARDA LAMBERT MD 7/23/2018 9:06 PM  July 23, 2018

## 2018-07-23 NOTE — MR AVS SNAPSHOT
After Visit Summary   7/23/2018    Chivo Urias    MRN: [id number]           Patient Information     Date Of Birth          1945        Visit Information        Provider Department      7/23/2018 10:30 AM Darrin Hernandez MD; [employer] [facility]        Today's Diagnoses     Hx of total knee arthroplasty, right    -  1    Foot pain, left        Essential hypertension with goal blood pressure less than 130/80        Hyperlipidemia with target LDL less than 100        Pure hypercholesterolemia        Diabetic polyneuropathy associated with diabetes mellitus due to underlying condition (H)        Screening for diabetic peripheral neuropathy        Cramp of both lower extremities        Benign non-nodular prostatic hyperplasia without lower urinary tract symptoms        Controlled type 2 diabetes mellitus without complication, without long-term current use of insulin (H)        Type 2 diabetes mellitus with stage 1 chronic kidney disease, without long-term current use of insulin (H)        Drug-induced constipation          Care Instructions    (Z96.651) Hx of total knee arthroplasty, right  (primary encounter diagnosis)  Comment:    Plan: lidocaine (LIDODERM) 5 % Patch             (M79.672) Foot pain, left  Comment:    Plan: acetaminophen (TYLENOL) 500 MG tablet             (I10) Essential hypertension with goal blood pressure less than 130/80  Comment:    Plan: aspirin 81 MG chewable tablet             (E78.5) Hyperlipidemia with target LDL less than 100  Comment:    Plan: aspirin 81 MG chewable tablet             (E78.00) Pure hypercholesterolemia  Comment:    Plan: atorvastatin (LIPITOR) 10 MG tablet             (E08.42) Diabetic polyneuropathy associated with diabetes mellitus due to underlying condition (H)  Comment:    Plan: blood glucose monitoring (MIKHAIL MICROLET)         lancets, blood glucose monitoring (MIKHAIL         CONTOUR  NEXT) test strip             (Z13.89) Screening for diabetic peripheral neuropathy  Comment:    Plan: cholecalciferol (VITAMIN D-1000 MAX ST) 1000         units TABS             (R25.2) Cramp of both lower extremities  Comment:    Plan: cholecalciferol (VITAMIN D-1000 MAX ST) 1000         units TABS             (N40.0) Benign non-nodular prostatic hyperplasia without lower urinary tract symptoms  Comment:    Plan: finasteride (PROSCAR) 5 MG tablet, tamsulosin         (FLOMAX) 0.4 MG capsule             (E11.9) Controlled type 2 diabetes mellitus without complication, without long-term current use of insulin (H)  Comment:    Plan: glimepiride (AMARYL) 1 MG tablet, metFORMIN         (GLUCOPHAGE-XR) 500 MG 24 hr tablet             (E11.22,  N18.1) Type 2 diabetes mellitus with stage 1 chronic kidney disease, without long-term current use of insulin (H)  Comment:    Plan: lisinopril (PRINIVIL/ZESTRIL) 2.5 MG tablet             (K59.03) Drug-induced constipation  Comment:    Plan: senna-docusate (SENOKOT-S;PERICOLACE) 8.6-50 MG        per tablet                             Follow-ups after your visit        Follow-up notes from your care team     Return in about 1 year (around 7/23/2019).      Your next 10 appointments already scheduled     Jul 30, 2018  7:30 AM CDT   [facility]    [address] [place] [id number] [phone]              Who to contact     If you have questions or need follow up information about today's clinic visit or your schedule please contact [facility] directly at [phone].  Normal or non-critical lab and imaging results will be communicated to you by MyChart, letter or phone within 4 business days after the clinic has received the results. If you do not hear from us within 7 days, please contact the  clinic through [employer] or phone. If you have a critical or abnormal lab result, we will notify you by phone as soon as possible.  Submit refill requests through [employer] or call your pharmacy and they will forward the refill request to us. Please allow 3 business days for your refill to be completed.          Additional Information About Your Visit        Care EveryWhere ID     This is your Care EveryWhere ID. This could be used by other organizations to access your [facility] medical records  [id number]        Your Vitals Were     Pulse Temperature Pulse Oximetry BMI (Body Mass Index)          72 98.3  F (36.8  C) (Tympanic) 98% 22.78 kg/m2         Blood Pressure from Last 3 Encounters:   07/23/18 124/72   06/28/18 112/64   12/19/17 124/72    Weight from Last 3 Encounters:   07/23/18 175 lb (79.4 kg)   06/28/18 182 lb (82.6 kg)   12/19/17 189 lb 8 oz (86 kg)              Today, you had the following     No orders found for display         Today's Medication Changes          These changes are accurate as of 7/23/18 10:58 AM.  If you have any questions, ask your nurse or doctor.               Start taking these medicines.        Dose/Directions    lidocaine 5 % Patch   Commonly known as:  LIDODERM   Used for:  Hx of total knee arthroplasty, right   Started by:  Darrin Hernandez MD        On am off hour of sleep   Quantity:  30 patch   Refills:  3       senna-docusate 8.6-50 MG per tablet   Commonly known as:  SENOKOT-S;PERICOLACE   Used for:  Drug-induced constipation   Started by:  Darrin Hernandez MD        Dose:  2 tablet   Take 2 tablets by mouth 2 times daily   Quantity:  60 tablet   Refills:  1            Where to get your medicines      These medications were sent to [facility] - [place], [place] - [address]  [address], [facility] [address]     Phone:  [phone]     acetaminophen 500 MG tablet    aspirin 81 MG chewable tablet    atorvastatin 10 MG tablet    blood  glucose monitoring lancets    blood glucose monitoring test strip    cholecalciferol 1000 units Tabs    finasteride 5 MG tablet    glimepiride 1 MG tablet    lisinopril 2.5 MG tablet    metFORMIN 500 MG 24 hr tablet    senna-docusate 8.6-50 MG per tablet    tamsulosin 0.4 MG capsule         Call your pharmacy to confirm that your medication is ready for pickup. It may take up to 24 hours for them to receive the prescription. If the prescription is not ready within 3 business days, please contact your clinic or your provider.     We will let you know when these medications are ready. If you don't hear back within 3 business days, please contact us.     lidocaine 5 % Patch                Primary Care Provider Office Phone # Fax #    Darrin Hernandez [phone] [address] [facility] [address]        Equal Access to Services     [facility] Hospital[facility]LIX : Hadii merry gooden hadasho Soomaali, waaxda luqadaha, qaybta kaalmada adehaiyabryson, mundo solis . So [facility] [phone].    ATENCIÓN: Si habla español, tiene a pizano disposición servicios gratuitos de asistencia lingüística. Lenny al [phone].    We comply with applicable federal civil rights laws and [place] laws. We do not discriminate on the basis of race, color, national origin, age, disability, sex, sexual orientation, or gender identity.            Thank you!     Thank you for choosing [facility] [place] [facility]  for your care. Our goal is always to provide you with excellent care. Hearing back from our patients is one way we can continue to improve our services. Please take a few minutes to complete the written survey that you may receive in the mail after your visit with us. Thank you!             Your Updated Medication List - Protect others around you: Learn how to safely use, store and throw away your medicines at www.disposemymeds.org.          This list is accurate as of 7/23/18 10:58 AM.   Always use your most recent med list.                   Brand Name Dispense Instructions for use Diagnosis    acetaminophen 500 MG tablet    TYLENOL    100 tablet    Take 1-2 tablets (500-1,000 mg) by mouth every 6 hours as needed    Foot pain, left       aspirin 81 MG chewable tablet     108 tablet    Take 1 tablet (81 mg) by mouth daily    Essential hypertension with goal blood pressure less than 130/80, Hyperlipidemia with target LDL less than 100       atorvastatin 10 MG tablet    LIPITOR    30 tablet    Take 1 tablet (10 mg) by mouth daily    Pure hypercholesterolemia       blood glucose monitoring lancets     100 each    1 each 2 times daily    Diabetic polyneuropathy associated with diabetes mellitus due to underlying condition (H)       blood glucose monitoring meter device kit     1 kit    Use to test blood sugars TWICE DAILY  times daily or as directed.    Type 2 diabetes mellitus without complication, without long-term current use of insulin (H)       blood glucose monitoring test strip    MIKHAIL CONTOUR NEXT    100 each    1 strip by In Vitro route daily    Diabetic polyneuropathy associated with diabetes mellitus due to underlying condition (H)       cholecalciferol 1000 units Tabs    VITAMIN D-1000 MAX ST    90 tablet    Take 3 tablets by mouth daily    Screening for diabetic peripheral neuropathy, Cramp of both lower extremities       finasteride 5 MG tablet    PROSCAR    90 tablet    Take 1 tablet (5 mg) by mouth daily    Benign non-nodular prostatic hyperplasia without lower urinary tract symptoms       glimepiride 1 MG tablet    AMARYL    30 tablet    Take 1 tablet (1 mg) by mouth every morning (before breakfast)    Controlled type 2 diabetes mellitus without complication, without long-term current use of insulin (H)       lidocaine 5 % Patch    LIDODERM    30 patch    On am off hour of sleep    Hx of total knee arthroplasty, right       lisinopril 2.5 MG tablet    PRINIVIL/Zestril    30 tablet     Take 1 tablet (2.5 mg) by mouth daily    Type 2 diabetes mellitus with stage 1 chronic kidney disease, without long-term current use of insulin (H)       metFORMIN 500 MG 24 hr tablet    GLUCOPHAGE-XR    60 tablet    TAKE 1 TABLET BY MOUTH TWICE DAILY WITH MEALS    Controlled type 2 diabetes mellitus without complication, without long-term current use of insulin (H)       senna-docusate 8.6-50 MG per tablet    SENOKOT-S;PERICOLACE    60 tablet    Take 2 tablets by mouth 2 times daily    Drug-induced constipation       tamsulosin 0.4 MG capsule    FLOMAX    90 capsule    Take 1 capsule (0.4 mg) by mouth daily    Benign non-nodular prostatic hyperplasia without lower urinary tract symptoms       Wrist Blood Pressure Monitor Misc     1 each    1 each 2 times daily as needed    Benign essential hypertension

## 2018-07-23 NOTE — PATIENT INSTRUCTIONS
(Z96.651) Hx of total knee arthroplasty, right  (primary encounter diagnosis)  Comment:    Plan: lidocaine (LIDODERM) 5 % Patch             (M79.672) Foot pain, left  Comment:    Plan: acetaminophen (TYLENOL) 500 MG tablet             (I10) Essential hypertension with goal blood pressure less than 130/80  Comment:    Plan: aspirin 81 MG chewable tablet             (E78.5) Hyperlipidemia with target LDL less than 100  Comment:    Plan: aspirin 81 MG chewable tablet             (E78.00) Pure hypercholesterolemia  Comment:    Plan: atorvastatin (LIPITOR) 10 MG tablet             (E08.42) Diabetic polyneuropathy associated with diabetes mellitus due to underlying condition (H)  Comment:    Plan: blood glucose monitoring (MIKHAIL MICROLET)         lancets, blood glucose monitoring (MIKHAIL         CONTOUR NEXT) test strip             (Z13.89) Screening for diabetic peripheral neuropathy  Comment:    Plan: cholecalciferol (VITAMIN D-1000 MAX ST) 1000         units TABS             (R25.2) Cramp of both lower extremities  Comment:    Plan: cholecalciferol (VITAMIN D-1000 MAX ST) 1000         units TABS             (N40.0) Benign non-nodular prostatic hyperplasia without lower urinary tract symptoms  Comment:    Plan: finasteride (PROSCAR) 5 MG tablet, tamsulosin         (FLOMAX) 0.4 MG capsule             (E11.9) Controlled type 2 diabetes mellitus without complication, without long-term current use of insulin (H)  Comment:    Plan: glimepiride (AMARYL) 1 MG tablet, metFORMIN         (GLUCOPHAGE-XR) 500 MG 24 hr tablet             (E11.22,  N18.1) Type 2 diabetes mellitus with stage 1 chronic kidney disease, without long-term current use of insulin (H)  Comment:    Plan: lisinopril (PRINIVIL/ZESTRIL) 2.5 MG tablet             (K59.03) Drug-induced constipation  Comment:    Plan: senna-docusate (SENOKOT-S;PERICOLACE) 8.6-50 MG        per tablet

## 2018-07-24 ENCOUNTER — TELEPHONE (OUTPATIENT)
Dept: FAMILY MEDICINE | Facility: CLINIC | Age: 73
End: 2018-07-24

## 2018-07-24 NOTE — TELEPHONE ENCOUNTER
PRIOR AUTHORIZATION DENIED    Medication: LIDOCAINE 5% PATCH - DENIED    Denial Date:  7/24/2018    Denial Rational: LIDODERM PATCHES ARE ONLY COVERED WITH THE DIAGNOSIS OF POST-HERPETIC NEURALGIA OR DIABETIC NEUROPATHY. IT WILL NOT BE COVERED FOR THE ASSOCIATED DIAGNOSIS      Appeal Information:

## 2018-07-30 ENCOUNTER — OFFICE VISIT (OUTPATIENT)
Dept: FAMILY MEDICINE | Facility: CLINIC | Age: 73
End: 2018-07-30
Payer: COMMERCIAL

## 2018-07-30 VITALS
OXYGEN SATURATION: 96 % | TEMPERATURE: 97.2 F | WEIGHT: 173 LBS | BODY MASS INDEX: 22.52 KG/M2 | SYSTOLIC BLOOD PRESSURE: 118 MMHG | DIASTOLIC BLOOD PRESSURE: 66 MMHG | HEART RATE: 73 BPM | RESPIRATION RATE: 16 BRPM

## 2018-07-30 DIAGNOSIS — Z96.651 STATUS POST TOTAL RIGHT KNEE REPLACEMENT: ICD-10-CM

## 2018-07-30 DIAGNOSIS — E78.5 HYPERLIPIDEMIA WITH TARGET LDL LESS THAN 100: Chronic | ICD-10-CM

## 2018-07-30 DIAGNOSIS — E11.22 TYPE 2 DIABETES MELLITUS WITH STAGE 1 CHRONIC KIDNEY DISEASE, WITHOUT LONG-TERM CURRENT USE OF INSULIN (H): Chronic | ICD-10-CM

## 2018-07-30 DIAGNOSIS — Z01.818 PREOP GENERAL PHYSICAL EXAM: Primary | ICD-10-CM

## 2018-07-30 DIAGNOSIS — N18.1 TYPE 2 DIABETES MELLITUS WITH STAGE 1 CHRONIC KIDNEY DISEASE, WITHOUT LONG-TERM CURRENT USE OF INSULIN (H): Chronic | ICD-10-CM

## 2018-07-30 DIAGNOSIS — I10 HYPERTENSION GOAL BP (BLOOD PRESSURE) < 130/80: Chronic | ICD-10-CM

## 2018-07-30 DIAGNOSIS — E11.9 CONTROLLED TYPE 2 DIABETES MELLITUS WITHOUT COMPLICATION, WITHOUT LONG-TERM CURRENT USE OF INSULIN (H): Chronic | ICD-10-CM

## 2018-07-30 DIAGNOSIS — Z23 NEED FOR PROPHYLACTIC VACCINATION AGAINST STREPTOCOCCUS PNEUMONIAE (PNEUMOCOCCUS): ICD-10-CM

## 2018-07-30 LAB
ALT SERPL W P-5'-P-CCNC: 22 U/L (ref 0–70)
ANION GAP SERPL CALCULATED.3IONS-SCNC: 10 MMOL/L (ref 3–14)
BUN SERPL-MCNC: 14 MG/DL (ref 7–30)
CALCIUM SERPL-MCNC: 9.2 MG/DL (ref 8.5–10.1)
CHLORIDE SERPL-SCNC: 102 MMOL/L (ref 94–109)
CHOLEST SERPL-MCNC: 158 MG/DL
CO2 SERPL-SCNC: 27 MMOL/L (ref 20–32)
CREAT SERPL-MCNC: 0.96 MG/DL (ref 0.66–1.25)
CREAT UR-MCNC: 228 MG/DL
GFR SERPL CREATININE-BSD FRML MDRD: 77 ML/MIN/1.7M2
GLUCOSE SERPL-MCNC: 132 MG/DL (ref 70–99)
HBA1C MFR BLD: 6.6 % (ref 0–5.6)
HDLC SERPL-MCNC: 40 MG/DL
LDLC SERPL CALC-MCNC: 80 MG/DL
MICROALBUMIN UR-MCNC: 11 MG/L
MICROALBUMIN/CREAT UR: 4.82 MG/G CR (ref 0–17)
NONHDLC SERPL-MCNC: 118 MG/DL
POTASSIUM SERPL-SCNC: 4.1 MMOL/L (ref 3.4–5.3)
SODIUM SERPL-SCNC: 139 MMOL/L (ref 133–144)
TRIGL SERPL-MCNC: 189 MG/DL

## 2018-07-30 PROCEDURE — 99207 C PAF COMPLETED  NO CHARGE: CPT | Performed by: FAMILY MEDICINE

## 2018-07-30 PROCEDURE — 80061 LIPID PANEL: CPT | Performed by: FAMILY MEDICINE

## 2018-07-30 PROCEDURE — 36415 COLL VENOUS BLD VENIPUNCTURE: CPT | Performed by: FAMILY MEDICINE

## 2018-07-30 PROCEDURE — 84460 ALANINE AMINO (ALT) (SGPT): CPT | Performed by: FAMILY MEDICINE

## 2018-07-30 PROCEDURE — 80048 BASIC METABOLIC PNL TOTAL CA: CPT | Performed by: FAMILY MEDICINE

## 2018-07-30 PROCEDURE — 83036 HEMOGLOBIN GLYCOSYLATED A1C: CPT | Performed by: FAMILY MEDICINE

## 2018-07-30 PROCEDURE — 82043 UR ALBUMIN QUANTITATIVE: CPT | Performed by: FAMILY MEDICINE

## 2018-07-30 PROCEDURE — 99214 OFFICE O/P EST MOD 30 MIN: CPT | Performed by: FAMILY MEDICINE

## 2018-07-30 RX ORDER — OXYCODONE AND ACETAMINOPHEN 5; 325 MG/1; MG/1
1 TABLET ORAL EVERY 6 HOURS PRN
Qty: 63 TABLET | Refills: 0 | Status: SHIPPED | OUTPATIENT
Start: 2018-07-30 | End: 2018-08-20

## 2018-07-30 NOTE — MR AVS SNAPSHOT
After Visit Summary   7/30/2018    Chivo Urias    MRN: [id number]           Patient Information     Date Of Birth          1945        Visit Information        Provider Department      7/30/2018 7:30 AM Darrin Hernandez MD; [employer] [facility]        Today's Diagnoses     Preop general physical exam    -  1    Need for prophylactic vaccination against Streptococcus pneumoniae (pneumococcus)        Type 2 diabetes mellitus with stage 1 chronic kidney disease, without long-term current use of insulin (H)        Controlled type 2 diabetes mellitus without complication, without long-term current use of insulin (H)        Hyperlipidemia with target LDL less than 100        Hypertension goal BP (blood pressure) < 130/80        Status post total right knee replacement          Care Instructions      Before Your Surgery      Call your surgeon if there is any change in your health. This includes signs of a cold or flu (such as a sore throat, runny nose, cough, rash or fever).    Do not smoke, drink alcohol or take over the counter medicine (unless your surgeon or primary care doctor tells you to) for the 24 hours before and after surgery.    If you take prescribed drugs: Follow your doctor s orders about which medicines to take and which to stop until after surgery.    Eating and drinking prior to surgery: follow the instructions from your surgeon    Take a shower or bath the night before surgery. Use the soap your surgeon gave you to gently clean your skin. If you do not have soap from your surgeon, use your regular soap. Do not shave or scrub the surgery site.  Wear clean pajamas and have clean sheets on your bed.     (Z01.818) Preop general physical exam  (primary encounter diagnosis)  Comment:    Plan: PAF COMPLETED             (Z23) Need for prophylactic vaccination against Streptococcus pneumoniae (pneumococcus)  Comment:    Plan:       (E11.22,  N18.1) Type 2 diabetes mellitus with stage 1 chronic kidney disease, without long-term current use of insulin (H)  Comment:    Plan:      (Z01.064) U rashel hogue (cilad khaas )  Faallo:  Qorshee: PAF BIXIYAY      (Z23) Baahida loo qabo tallaalka kahortagga ee Streptococcus pneumoniae (pneumococcus)  Faallo:  Qorshee:    (E11.22, N18.1) Nooca 2 diabetes mellitus qaba marxaladdan 1 cudur kelyo joogta ah, iyada oo aan waqti dheer isticmaalin insulin (H)  Faallo:  Qorshee:    (E11.9) Nooca la kontoroolo nooca 2 diabetes mellitus adigoon wax dhib ah lahayn, iyada oo aan waqti dheer isticmaalin insulin (H)  Faallo:  Qorshaha: HEMOGLOBIN A1C, Saul Khadadka Cagaarka  Tirada leh Isku Darka      (E78.5) Hyperlipidemia oo leh bartilmaameed LDL ka willow 100  Faallo:  Qorshee: Daaqada digitalka ah ee nadiifinta si toos ah LDL Fasting, ALT      (I10) Hadafka dhiigga ee BP (cadaadiska dhiigga) <130/80  Faallo:  Qorshaha: Qaybta dheef-shiid kiimikaadka hoose      (Z96.651) Dhammaadka boostada ayaa isu-bedelaya jilibka saxda ah  Faallo:  Qorshee: oxyCODONE-acetaminophen (PERCOCET) 5-325 MG halkii  kiniin    KNEE FASAXADA    WAXYAABAHA KNEE 5 RANULFO DUWAN KA HORTAGI KARTAA HADDII AAD LAHAYN  ISOMETRIC KNEE KA HOREYSO ISBEDELKA XAALADDA X 30 SABABTA HESHIISKA \  FOOMKA KNEE ISOMETRIC 90 CINWAANKA XAASHIINTA X 30 LABAAD  WAQTIGA DHIBAATOOYINKA AMA AH EE BULSHADA  FADHIYADA KNEE XEERKA 3AAD WAX KA BEDELAYO DHALINYARADA  SI JOOJINTA KNEE FLEXED X 30 QAYBTA LABAAD HADDII  KA BEDDADA TALLAABSAN MICHAEL, MARCELO HUGOA KOOWAAD 1-2 WAKHTIGA SHARCIYADA 30 REPS KU JOOJI KARTAA DHAQAALKA AMA DHISMAHA  KA DAMBEEYA 2 MARTY BANDHIGAA LAMBARKA 3 FALALAHA  KA DAMBEEYA 4 MARTY BANDHIGIDA 4 DHISMAHA  WALL MEEL QOF KA CIYAARSAN 30 MARTY BOOQDO 45 MEELAHA  KA WARBIXINTA 2 TALLAABOOYIN 30 DHISMAHA 60 DHISMAHA  KA WARBIXINTA 4 TALLAABOOYIN 30 MARTY BOOQDAY 90 DEG  PRETTY-KA BILOW 30 DHISMAHA LAGU QAADANAYO BRIGITTE U DHACAY 30 DEGECE IYO ARS OO  PATSY HELAY X 2 HADDII  CANDE-QIIMAHA 30 DHISMAHA WAX-KA LAGA TRISTIN KARTAA 60 DHISMAHA IYO JINSIYANATE SIDA X 2 HADDII  REPEAT oo la socota fal-dambiyeedyo      (E11.9) Controlled type 2 diabetes mellitus without complication, without long-term current use of insulin (H)  Comment:    Plan: HEMOGLOBIN A1C, Albumin Random Urine         Quantitative with Creat Ratio             (E78.5) Hyperlipidemia with target LDL less than 100  Comment:    Plan: Lipid panel reflex to direct LDL Fasting, ALT             (I10) Hypertension goal BP (blood pressure) < 130/80  Comment:    Plan: Basic metabolic panel             (Z96.651) Status post total right knee replacement  Comment:    Plan: oxyCODONE-acetaminophen (PERCOCET) 5-325 MG per        tablet                KNEE EXERCISES        ICE TO KNEE 5 MINUTES PRIOR TO EXERCISE IF POSSIBLE    ISOMETRIC KNEE EXTENDED POSITION STANDING X 30 TWICE DAILY \    FLEXION OF KNEE ISOMETRIC 90 DEGREE FLEXION X 30 TWICE DAILY    WITH FIVE POUND WEIGHTS OR PURSE    SITTING EXTENDED KNEE  X 3O SECONDS TWICE DAILY    STANDING WITH KNEE FLEXED X 30 SECONDS TWICE DAILY    CLOSED CHAIN EXERCISE  ,THAT IS ONE 1-2 INCH BOOK 30 REPS ON AND OFF THE BOOK OR PLATFORM     AFTER 2 WEEK INCREASE TO 3 INCHES    AFTER 4 WEEKS INCREASE TO 4 INCHES    WALL SITTING 30 SECONDS 45 DEGREES    AFTER 2 WEEKS 30 SECONDS 60 DEGREES    AFTER  4 WEEKS 30 SECONDS 90 DEGREES    BALANCE 30 SECONDS WITH OPPOSITE LEG AT 30 DEGREES AND ARM TO SIDE X 2 WEEKS    BALANCE 30 SECONDS WITH OPPOSITE LEG AT 60 DEGREES AND ARM TO SIDE X 2 WEEKS    REPEAT with OPPOSITE LEGS BALANCING       A               Follow-ups after your visit        Who to contact     If you have questions or need follow up information about today's clinic visit or your schedule please contact [facility] directly at [phone].  Normal or non-critical lab and imaging results will be communicated to you by MyChart, letter or phone  within 4 business days after the clinic has received the results. If you do not hear from us within 7 days, please contact the clinic through [employer] or phone. If you have a critical or abnormal lab result, we will notify you by phone as soon as possible.  Submit refill requests through [employer] or call your pharmacy and they will forward the refill request to us. Please allow 3 business days for your refill to be completed.          Additional Information About Your Visit        Care EveryWhere ID     This is your Care EveryWhere ID. This could be used by other organizations to access your [place] medical records  [id number]        Your Vitals Were     Pulse Temperature Respirations Pulse Oximetry BMI (Body Mass Index)       73 97.2  F (36.2  C) (Tympanic) 16 96% 22.52 kg/m2        Blood Pressure from Last 3 Encounters:   07/30/18 118/66   07/23/18 124/72   06/28/18 112/64    Weight from Last 3 Encounters:   07/30/18 173 lb (78.5 kg)   07/23/18 175 lb (79.4 kg)   06/28/18 182 lb (82.6 kg)              We Performed the Following     Albumin Random Urine Quantitative with Creat Ratio     ALT     Basic metabolic panel     HEMOGLOBIN A1C     Lipid panel reflex to direct LDL Fasting     PAF COMPLETED          Today's Medication Changes          These changes are accurate as of 7/30/18  8:20 AM.  If you have any questions, ask your nurse or doctor.               Start taking these medicines.        Dose/Directions    oxyCODONE-acetaminophen 5-325 MG per tablet   Commonly known as:  PERCOCET   Used for:  Status post total right knee replacement   Started by:  Darrin Hernandez MD        Dose:  1 tablet   Take 1 tablet by mouth every 6 hours as needed for pain   Quantity:  63 tablet   Refills:  0            Where to get your medicines      Some of these will need a paper prescription and others can be bought over the counter.  Ask your nurse if you have questions.     Bring a paper prescription for each of these  medications     oxyCODONE-acetaminophen 5-325 MG per tablet               Information about OPIOIDS     PRESCRIPTION OPIOIDS: WHAT YOU NEED TO KNOW   We gave you an opioid (narcotic) pain medicine. It is important to manage your pain, but opioids are not always the best choice. You should first try all the other options your care team gave you. Take this medicine for as short a time (and as few doses) as possible.     These medicines have risks:    DO NOT drive when on new or higher doses of pain medicine. These medicines can affect your alertness and reaction times, and you could be arrested for driving under the influence (DUI). If you need to use opioids long-term, talk to your care team about driving.    DO NOT operate heave machinery    DO NOT do any other dangerous activities while taking these medicines.     DO NOT drink any alcohol while taking these medicines.      If the opioid prescribed includes acetaminophen, DO NOT take with any other medicines that contain acetaminophen. Read all labels carefully. Look for the word  acetaminophen  or  Tylenol.  Ask your pharmacist if you have questions or are unsure.    You can get addicted to pain medicines, especially if you have a history of addiction (chemical, alcohol or substance dependence). Talk to your care team about ways to reduce this risk.    Store your pills in a secure place, locked if possible. We will not replace any lost or stolen medicine. If you don t finish your medicine, please throw away (dispose) as directed by your pharmacist. The [place] Pollution Control Agency has more information about safe disposal: https://www.pca.[facility].[place].us/living-green/managing-unwanted-medications.     All opioids tend to cause constipation. Drink plenty of water and eat foods that have a lot of fiber, such as fruits, vegetables, prune juice, apple juice and high-fiber cereal. Take a laxative (Miralax, milk of magnesia, Colace, Senna) if you don t move your bowels  at least every other day.          Primary Care Provider Office Phone # Fax #    Darrin Marilou Hernandez [phone] [address] [facility] [address]        Equal Access to Services     NAYELY WEBB : Hadii merry ku hadbingo Soomaali, waaxda luqadaha, qaybta kaalmada adeegyada, mundo moein hayaan amlihahai zarco will acevedo. So [facility] [phone].    ATENCIÓN: Si habla español, tiene a pizano disposición servicios gratuitos de asistencia lingüística. Llame al [phone].    We comply with applicable federal civil rights laws and [place] laws. We do not discriminate on the basis of race, color, national origin, age, disability, sex, sexual orientation, or gender identity.            Thank you!     Thank you for choosing [facility]  for your care. Our goal is always to provide you with excellent care. Hearing back from our patients is one way we can continue to improve our services. Please take a few minutes to complete the written survey that you may receive in the mail after your visit with us. Thank you!             Your Updated Medication List - Protect others around you: Learn how to safely use, store and throw away your medicines at www.disposemymeds.org.          This list is accurate as of 7/30/18  8:20 AM.  Always use your most recent med list.                   Brand Name Dispense Instructions for use Diagnosis    acetaminophen 500 MG tablet    TYLENOL    100 tablet    Take 1-2 tablets (500-1,000 mg) by mouth every 6 hours as needed    Foot pain, left       aspirin 81 MG chewable tablet     108 tablet    Take 1 tablet (81 mg) by mouth daily    Essential hypertension with goal blood pressure less than 130/80, Hyperlipidemia with target LDL less than 100       atorvastatin 10 MG tablet    LIPITOR    30 tablet    Take 1 tablet (10 mg) by mouth daily    Pure hypercholesterolemia       blood glucose monitoring lancets     100 each    1 each 2 times daily     Diabetic polyneuropathy associated with diabetes mellitus due to underlying condition (H)       blood glucose monitoring meter device kit     1 kit    Use to test blood sugars TWICE DAILY  times daily or as directed.    Type 2 diabetes mellitus without complication, without long-term current use of insulin (H)       blood glucose monitoring test strip    MIKHAIL CONTOUR NEXT    100 each    1 strip by In Vitro route daily    Diabetic polyneuropathy associated with diabetes mellitus due to underlying condition (H)       cholecalciferol 1000 units Tabs    VITAMIN D-1000 MAX ST    90 tablet    Take 3 tablets by mouth daily    Screening for diabetic peripheral neuropathy, Cramp of both lower extremities       finasteride 5 MG tablet    PROSCAR    90 tablet    Take 1 tablet (5 mg) by mouth daily    Benign non-nodular prostatic hyperplasia without lower urinary tract symptoms       glimepiride 1 MG tablet    AMARYL    30 tablet    Take 1 tablet (1 mg) by mouth every morning (before breakfast)    Controlled type 2 diabetes mellitus without complication, without long-term current use of insulin (H)       lidocaine 5 % Patch    LIDODERM    30 patch    On am off hour of sleep    Hx of total knee arthroplasty, right       lisinopril 2.5 MG tablet    PRINIVIL/Zestril    30 tablet    Take 1 tablet (2.5 mg) by mouth daily    Type 2 diabetes mellitus with stage 1 chronic kidney disease, without long-term current use of insulin (H)       metFORMIN 500 MG 24 hr tablet    GLUCOPHAGE-XR    60 tablet    TAKE 1 TABLET BY MOUTH TWICE DAILY WITH MEALS    Controlled type 2 diabetes mellitus without complication, without long-term current use of insulin (H)       oxyCODONE-acetaminophen 5-325 MG per tablet    PERCOCET    63 tablet    Take 1 tablet by mouth every 6 hours as needed for pain    Status post total right knee replacement       senna-docusate 8.6-50 MG per tablet    SENOKOT-S;PERICOLACE    60 tablet    Take 2 tablets by mouth 2 times  daily    Drug-induced constipation       tamsulosin 0.4 MG capsule    FLOMAX    90 capsule    Take 1 capsule (0.4 mg) by mouth daily    Benign non-nodular prostatic hyperplasia without lower urinary tract symptoms       Wrist Blood Pressure Monitor Misc     1 each    1 each 2 times daily as needed    Benign essential hypertension

## 2018-07-30 NOTE — LETTER
"July 31, 2018      Chivo Urias  [address]  [facility] [address]        Dear ,    We are writing to inform you of your test results.    NORMAL DIABETES URINE PROTEIN TEST   NORMAL TOTAL CHOLESTEROL   BORDERLINE  TRIGLYCERIDES   NORMAL HDL OR \"GOOD\" CHOLESTEROL   NORMAL LDL OR \"BAD\" CHOLESTEROL   NORMAL VERY LOW DENSITY CHOLESTEROL   NORMAL GLUCOSE, RENAL AND BLOOD SALTS     BORDERLINE  GLUCOSE   NORMAL THREE MONTH GLUCOSE AVERAGE     Resulted Orders   HEMOGLOBIN A1C   Result Value Ref Range    Hemoglobin A1C 6.6 (H) 0 - 5.6 %      Comment:      Normal <5.7% Prediabetes 5.7-6.4%  Diabetes 6.5% or higher - adopted from ADA   consensus guidelines.     Albumin Random Urine Quantitative with Creat Ratio   Result Value Ref Range    Creatinine Urine 228 mg/dL    Albumin Urine mg/L 11 mg/L    Albumin Urine mg/g Cr 4.82 0 - 17 mg/g Cr   Lipid panel reflex to direct LDL Fasting   Result Value Ref Range    Cholesterol 158 <200 mg/dL    Triglycerides 189 (H) <150 mg/dL      Comment:      Borderline high:  150-199 mg/dl  High:             200-499 mg/dl  Very high:       >499 mg/dl  Fasting specimen      HDL Cholesterol 40 >39 mg/dL    LDL Cholesterol Calculated 80 <100 mg/dL      Comment:      Desirable:       <100 mg/dl    Non HDL Cholesterol 118 <130 mg/dL   ALT   Result Value Ref Range    ALT 22 0 - 70 U/L   Basic metabolic panel   Result Value Ref Range    Sodium 139 133 - 144 mmol/L    Potassium 4.1 3.4 - 5.3 mmol/L    Chloride 102 94 - 109 mmol/L    Carbon Dioxide 27 20 - 32 mmol/L    Anion Gap 10 3 - 14 mmol/L    Glucose 132 (H) 70 - 99 mg/dL      Comment:      Fasting specimen    Urea Nitrogen 14 7 - 30 mg/dL    Creatinine 0.96 0.66 - 1.25 mg/dL    GFR Estimate 77 >60 mL/min/1.7m2      Comment:      Non  GFR Calc    GFR Estimate If Black >90 >60 mL/min/1.7m2      Comment:       GFR Calc    Calcium 9.2 8.5 - 10.1 mg/dL       If you have any " questions or concerns, please call the clinic at the number listed above.       Sincerely,        EDUARDA LAMBERT MD

## 2018-07-30 NOTE — PROGRESS NOTES
[address]  [address]  [facility] [address]  [phone]  Dept: [phone]    PRE-OP EVALUATION:  Today's date: 2018    Chivo Urias (: 1945) presents for pre-operative evaluation assessment as requested by Dr. Puentes.  He requires evaluation and anesthesia risk assessment prior to undergoing surgery/procedure for treatment of cataract .  [facility]    Fax number for surgical facility:   Primary Physician: Darrin Hernandez  Type of Anesthesia Anticipated: to be determined    Patient has a Health Care Directive or Living Will:  NO    Preop Questions 2018   Who is doing your surgery? dr brody gilmore   What are you having done? Cataract right   Date of Surgery/Procedure:    Facility or Hospital where procedure/surgery will be performed: -   1.  Do you have a history of Heart attack, stroke, stent, coronary bypass surgery, or other heart surgery? No   2.  Do you ever have any pain or discomfort in your chest? No   3.  Do you have a history of  Heart Failure? No   4.   Are you troubled by shortness of breath when:  walking on a level surface, or up a slight hill, or at night? No   5.  Do you currently have a cold, bronchitis or other respiratory infection? No   6.  Do you have a cough, shortness of breath, or wheezing? No   7.  Do you sometimes get pains in the calves of your legs when you walk? No   8. Do you or anyone in your family have previous history of blood clots? No   9.  Do you or does anyone in your family have a serious bleeding problem such as prolonged bleeding following surgeries or cuts? No   10. Have you ever had problems with anemia or been told to take iron pills? No   11. Have you had any abnormal blood loss such as black, tarry or bloody stools? No   12. Have you ever had a blood transfusion? No   13. Have you or any of your relatives ever had problems with anesthesia? No   14.  Do you have sleep apnea, excessive snoring or daytime drowsiness? No   15. Do you have any prosthetic heart valves? No   16. Do you have prosthetic joints? No         HPI:     HPI related to upcoming procedure: right with glaucoma and cataract removal planned      DIABETES - Patient has a longstanding history of DiabetesType Type II . Patient is being treated with none and denies significant side effects. Control has been good. Complicating factors include but are not limited to: hypertension and hyperlipidemia.                                                                                                                            .  HYPERLIPIDEMIA - Patient has a long history of significant Hyperlipidemia requiring medication for treatment with recent good control. Patient reports no problems or side effects with the medication.                                                                                                                                                       .  HYPERTENSION - Patient has longstanding history of HTN , currently denies any symptoms referable to elevated blood pressure. Specifically denies chest pain, palpitations, dyspnea, orthopnea, PND or peripheral edema. Blood pressure readings have been in normal range. Current medication regimen is as listed below. Patient denies any side effects of medication.                                                                                                                                                                                          .  RENAL INSUFFICIENCY - Patient has a longstanding history of moderate-severe chronic renal insufficiency. Last Cr  Stage 1only.   1.03                                                                                                                                                                             .  (Z01.818) Preop general physical exam  (primary encounter diagnosis)  Comment:    Plan: PAF  COMPLETED             (Z23) Need for prophylactic vaccination against Streptococcus pneumoniae (pneumococcus)  Comment:    Plan:      (E11.22,  N18.1) Type 2 diabetes mellitus with stage 1 chronic kidney disease, without long-term current use of insulin (H)  Comment:    Plan:      (E11.9) Controlled type 2 diabetes mellitus without complication, without long-term current use of insulin (H)  Comment:    Plan: HEMOGLOBIN A1C             (E78.5) Hyperlipidemia with target LDL less than 100  Comment:    Plan:  [id number]     (I10) Hypertension goal BP (blood pressure) < 130/80  Comment:    Plan:      (Z96.651) Status post total right knee replacement  Comment:    Plan: oxyCODONE-acetaminophen (PERCOCET) 5-325 MG per        tablet         Three times daily       MEDICAL HISTORY:     Patient Active Problem List    Diagnosis Date Noted     Hypertension goal BP (blood pressure) < 130/80 06/19/2013     Priority: High     Stage 1 chronic renal impairment associated with type 2 diabetes mellitus (H) 06/10/2013     Priority: High     Controlled type 2 diabetes mellitus without complication (H) 05/23/2013     Priority: High     Hyperlipidemia with target LDL less than 100 05/23/2013     Priority: High     Diagnosis updated by automated process. Provider to review and confirm.       Enthesopathy of right hip region 09/15/2016     Priority: Medium     ACP (advance care planning) 01/19/2016     Priority: Medium     Advance Care Planning 1/19/2016: ACP Review of Chart / Resources Provided:  Reviewed chart for advance care plan.  Chivo Urias has no plan or code status on file. Discussed available resources and provided with information. Pt declined form at this time.  Added by Stella Khan           Type 2 diabetes, HbA1C goal < 8% (H) 11/02/2015     Priority: Medium     Right hip pain 10/15/2015     Priority: Medium     Low HDL (under 40) 06/19/2013     Priority: Medium     CKD (chronic kidney disease) stage 2, GFR 60-89  ml/min 2013     Priority: Medium     Comprehensive diabetic foot examination, type 2 DM, encounter for (H) 06/10/2013     Priority: Medium     Vitamin D insufficiency 2013     Priority: Medium     DDD (degenerative disc disease), lumbar 2013     Priority: Medium     radic right leg       Foot pain, left 2013     Priority: Medium     Myalgia and myositis 2013     Priority: Medium     Problem list name updated by automated process. Provider to review       Allergic rhinitis 2013     Priority: Medium     Problem list name updated by automated process. Provider to review       Sciatica 2013     Priority: Medium     Glaucoma 2013     Priority: Medium     Problem list name updated by automated process. Provider to review       Health Care Home 2012     Priority: Medium     [facility] Case Management  Laura Romo RN  [phone]   [facility] CARE PLAN SUMMARY    Client Name:  Chivo Urias  Address:  [address] [place] [address] Phone: [phone] (home)    :  1945 Date of Assessment: 18   Health Plan: [facility]   Health Plan Number: [id number] Medical Assistance Number: [id number]  Financial Worker:  Skyler   Case #:  [id number]   [facility] :  Laura Romo RN  Phone:  [phone]   Fax:  [phone]   [facility] Enrollment Date: 13 Case Mix:  L  Rate Cell: B  Waiver Type: EW   Emergency Contact:Chivo Oliveira  Home Phone: [phone]  Relation: Relative Language:  Kittitian  :  CM speaks Kittitian   Health Care Agent/POA:  None Advanced Directives/Living Will:  None   Primary Care Clinic/Phone/Fax:  [facility]/p) [phone], f) [phone] Primary Dx:  Type 2 DM(E11.9)   Secondary Dx:  Degenerative disc disease (M51.36), Sciatica (M54.30)   Primary Physician:  Darrin Hernandez   Height:  6'1.5  Weight:  189 lbs   Specialty Physician:  [facility]   Audiologist:  N/A   Eye Care Provider:  Dr. Deloris Manriquezom: [facility] [phone] Dental Care Provider:    [place]: [facility] [phone] or [phone]   Other:        [employer] CURRENT SERVICES SUMMARY  Equipment owned/DME history:   SERVICE TYPE/PROVIDER NAME/PHONE AUTH DATE FREQUENCY Units OR $ Amt DESCRIPTION   Medical Transportation: [facility] [phone]  Fax:  04/01/18-  03/31/19 as needed N/A    Homemaking: [facility] [phone]    Fax:  04/01/18-  02/28/19 weekly 20 units/week Homemaking                                   Past Medical History:   Diagnosis Date     Arthritis      Controlled type 2 diabetes mellitus without complication (H) 5/23/2013     Diabetes mellitus (H)      Hyperlipidemia 1/7/2013    Problem list name updated by automated process. Provider to review     Hypertension      History reviewed. No pertinent surgical history.  Current Outpatient Prescriptions   Medication Sig Dispense Refill     acetaminophen (TYLENOL) 500 MG tablet Take 1-2 tablets (500-1,000 mg) by mouth every 6 hours as needed 100 tablet 11     aspirin 81 MG chewable tablet Take 1 tablet (81 mg) by mouth daily 108 tablet 3     atorvastatin (LIPITOR) 10 MG tablet Take 1 tablet (10 mg) by mouth daily 30 tablet 11     blood glucose monitoring (MIKHAIL CONTOUR MONITOR) meter device kit Use to test blood sugars TWICE DAILY  times daily or as directed. 1 kit 0     blood glucose monitoring (MIKHAIL CONTOUR NEXT) test strip 1 strip by In Vitro route daily 100 each 11     blood glucose monitoring (MIKHAIL MICROLET) lancets 1 each 2 times daily 100 each 11     Blood Pressure Monitoring (WRIST BLOOD PRESSURE MONITOR) MISC 1 each 2 times daily as needed 1 each 0     cholecalciferol (VITAMIN D-1000 MAX ST) 1000 units TABS Take 3 tablets by mouth daily 90 tablet 11     finasteride (PROSCAR) 5 MG tablet Take 1 tablet (5 mg) by mouth daily 90 tablet 3     glimepiride  (AMARYL) 1 MG tablet Take 1 tablet (1 mg) by mouth every morning (before breakfast) 30 tablet 11     lidocaine (LIDODERM) 5 % Patch On am off hour of sleep 30 patch 3     lisinopril (PRINIVIL/ZESTRIL) 2.5 MG tablet Take 1 tablet (2.5 mg) by mouth daily 30 tablet 11     metFORMIN (GLUCOPHAGE-XR) 500 MG 24 hr tablet TAKE 1 TABLET BY MOUTH TWICE DAILY WITH MEALS 60 tablet 11     oxyCODONE-acetaminophen (PERCOCET) 5-325 MG per tablet Take 1 tablet by mouth every 6 hours as needed for pain 63 tablet 0     senna-docusate (SENOKOT-S;PERICOLACE) 8.6-50 MG per tablet Take 2 tablets by mouth 2 times daily 60 tablet 1     tamsulosin (FLOMAX) 0.4 MG capsule Take 1 capsule (0.4 mg) by mouth daily 90 capsule 3     OTC products: None, except as noted above    No Known Allergies   Latex Allergy: NO    Social History   Substance Use Topics     Smoking status: Never Smoker     Smokeless tobacco: Never Used     Alcohol use No     History   Drug Use No       REVIEW OF SYSTEMS:   CONSTITUTIONAL: NEGATIVE for fever, chills, change in weight  Feverish at night  INTEGUMENTARY/SKIN: NEGATIVE for worrisome rashes, moles or lesions  EYES:  Poor vision right eye and glaucoma   ENT/MOUTH: NEGATIVE for ear, mouth and throat problems  RESP: NEGATIVE for significant cough or SOB  BREAST: NEGATIVE for masses, tenderness or discharge  CV: NEGATIVE for chest pain, palpitations or peripheral edema  GI: NEGATIVE for nausea, abdominal pain, heartburn, or change in bowel habits  : NEGATIVE for frequency, dysuria, or hematuria  MUSCULOSKELETAL RIGHT TOTAL KNEE ARTHROPLASTY 3 WEEKS AGO   NEURO: NEGATIVE for weakness, dizziness or paresthesias  ENDOCRINE: Hx diabetes  HEME: NEGATIVE for bleeding problems  PSYCHIATRIC: NEGATIVE for changes in mood or affect    EXAM:   /66 (Cuff Size: Adult Regular)  Pulse 73  Temp 97.2  F (36.2  C) (Tympanic)  Resp 16  Wt 173 lb (78.5 kg)  SpO2 96%  BMI 22.52 kg/m2    GENERAL APPEARANCE: healthy, alert and no  distress     EYES: Eyes grossly normal to inspection and  CATARACT RIGHT EYE   LEFT EYE OK      HENT: ear canals and TM's normal and nose and mouth without ulcers or lesions     NECK: no adenopathy, no asymmetry, masses, or scars and thyroid normal to palpation     RESP: lungs clear to auscultation - no rales, rhonchi or wheezes     CV: regular rates and rhythm, normal S1 S2, no S3 or S4 and no murmur, click or rub     ABDOMEN:  soft, nontender, no HSM or masses and bowel sounds normal     MS: extremities normal- no gross deformities noted and  RIGHT TOTAL KNEE ARTHROPLASTY      SKIN:  SCAR RIGHT KNEE PAIN DECREASE RANGE OF MOTION      NEURO: Normal strength and tone, sensory exam grossly normal, mentation intact and speech normal     PSYCH: mentation appears normal. and affect normal/bright     LYMPHATICS: No cervical adenopathy    DIAGNOSTICS:     Labs Drawn and in Process:   Unresulted Labs Ordered in the Past 30 Days of this Admission     No orders found from 5/31/2018 to 7/31/2018.          Recent Labs   Lab Test  06/28/18   0823  09/29/17   0832  06/08/17   0808   HGB  16.9  15.0  15.6   PLT  194  190  193   NA  139  140  139   POTASSIUM  4.3  4.2  4.0   CR  1.03  0.90  0.88   A1C   --   7.1*  7.6*        IMPRESSION:   Reason for surgery/procedure:  RIGHT EYE GLAUCOMA AND CATARACT  Diagnosis/reason for consult: RIGHT EYE GLAUCOMA  AND CATARACT     The proposed surgical procedure is considered LOW risk.    REVISED CARDIAC RISK INDEX  The patient has the following serious cardiovascular risks for perioperative complications such as (MI, PE, VFib and 3  AV Block):  No serious cardiac risks  INTERPRETATION: 1 risks: Class II (low risk - 0.9% complication rate)    The patient has the following additional risks for perioperative complications:  No identified additional risks  The 10-year ASCVD risk score (Berwick EVELYN Jr, et al., 2013) is: 31.5%    Values used to calculate the score:      Age: 72 years      Sex: Male       Is Non- : Yes      Diabetic: Yes      Tobacco smoker: No      Systolic Blood Pressure: 118 mmHg      Is BP treated: Yes      HDL Cholesterol: 40 mg/dL      Total Cholesterol: 140 mg/dL      ICD-10-CM    1. Preop general physical exam Z01.818 PAF COMPLETED   2. Need for prophylactic vaccination against Streptococcus pneumoniae (pneumococcus) Z23    3. Type 2 diabetes mellitus with stage 1 chronic kidney disease, without long-term current use of insulin (H) E11.22     N18.1    4. Controlled type 2 diabetes mellitus without complication, without long-term current use of insulin (H) E11.9 HEMOGLOBIN A1C   5. Hyperlipidemia with target LDL less than 100 E78.5    6. Hypertension goal BP (blood pressure) < 130/80 I10    7. Status post total right knee replacement Z96.651 oxyCODONE-acetaminophen (PERCOCET) 5-325 MG per tablet       RECOMMENDATIONS:     --Consult hospital rounder / IM to assist post-op medical management    --Patient is to take all scheduled medications on the day of surgery EXCEPT for modifications listed below.    Diabetes Medication Use     -----Hold usual oral and non-insulin diabetic meds (e.g. Metformin, Actos, Glipizide) while NPO.       APPROVAL GIVEN to proceed with proposed procedure, without further diagnostic evaluation       Signed Electronically by: EDUARDA LAMBERT MD    Copy of this evaluation report is provided to requesting physician.    [facility] Preop Guidelines    Revised Cardiac Risk Index

## 2018-07-30 NOTE — PATIENT INSTRUCTIONS
Before Your Surgery      Call your surgeon if there is any change in your health. This includes signs of a cold or flu (such as a sore throat, runny nose, cough, rash or fever).    Do not smoke, drink alcohol or take over the counter medicine (unless your surgeon or primary care doctor tells you to) for the 24 hours before and after surgery.    If you take prescribed drugs: Follow your doctor s orders about which medicines to take and which to stop until after surgery.    Eating and drinking prior to surgery: follow the instructions from your surgeon    Take a shower or bath the night before surgery. Use the soap your surgeon gave you to gently clean your skin. If you do not have soap from your surgeon, use your regular soap. Do not shave or scrub the surgery site.  Wear clean pajamas and have clean sheets on your bed.     (Z01.818) Preop general physical exam  (primary encounter diagnosis)  Comment:    Plan: PAF COMPLETED             (Z23) Need for prophylactic vaccination against Streptococcus pneumoniae (pneumococcus)  Comment:    Plan:      (E11.22,  N18.1) Type 2 diabetes mellitus with stage 1 chronic kidney disease, without long-term current use of insulin (H)  Comment:    Plan:            (E11.9) Controlled type 2 diabetes mellitus without complication, without long-term current use of insulin (H)  Comment:    Plan: HEMOGLOBIN A1C, Albumin Random Urine         Quantitative with Creat Ratio             (E78.5) Hyperlipidemia with target LDL less than 100  Comment:    Plan: Lipid panel reflex to direct LDL Fasting, ALT             (I10) Hypertension goal BP (blood pressure) < 130/80  Comment:    Plan: Basic metabolic panel             (Z96.651) Status post total right knee replacement  Comment:    Plan: oxyCODONE-acetaminophen (PERCOCET) 5-325 MG per        tablet                KNEE EXERCISES        ICE TO KNEE 5 MINUTES PRIOR TO EXERCISE IF POSSIBLE    ISOMETRIC KNEE EXTENDED POSITION STANDING X 30 TWICE  DAILY \    FLEXION OF KNEE ISOMETRIC 90 DEGREE FLEXION X 30 TWICE DAILY    WITH FIVE POUND WEIGHTS OR PURSE    SITTING EXTENDED KNEE  X 3O SECONDS TWICE DAILY    STANDING WITH KNEE FLEXED X 30 SECONDS TWICE DAILY    CLOSED CHAIN EXERCISE  ,THAT IS ONE 1-2 INCH BOOK 30 REPS ON AND OFF THE BOOK OR PLATFORM     AFTER 2 WEEK INCREASE TO 3 INCHES    AFTER 4 WEEKS INCREASE TO 4 INCHES    WALL SITTING 30 SECONDS 45 DEGREES    AFTER 2 WEEKS 30 SECONDS 60 DEGREES    AFTER  4 WEEKS 30 SECONDS 90 DEGREES    BALANCE 30 SECONDS WITH OPPOSITE LEG AT 30 DEGREES AND ARM TO SIDE X 2 WEEKS    BALANCE 30 SECONDS WITH OPPOSITE LEG AT 60 DEGREES AND ARM TO SIDE X 2 WEEKS    REPEAT with OPPOSITE LEGS BALANCING       A

## 2018-08-20 ENCOUNTER — OFFICE VISIT (OUTPATIENT)
Dept: FAMILY MEDICINE | Facility: CLINIC | Age: 73
End: 2018-08-20
Payer: COMMERCIAL

## 2018-08-20 VITALS
WEIGHT: 175 LBS | OXYGEN SATURATION: 98 % | RESPIRATION RATE: 16 BRPM | HEART RATE: 68 BPM | DIASTOLIC BLOOD PRESSURE: 64 MMHG | SYSTOLIC BLOOD PRESSURE: 112 MMHG | TEMPERATURE: 96.9 F | BODY MASS INDEX: 22.78 KG/M2

## 2018-08-20 DIAGNOSIS — E11.9 CONTROLLED TYPE 2 DIABETES MELLITUS WITHOUT COMPLICATION, WITHOUT LONG-TERM CURRENT USE OF INSULIN (H): Chronic | ICD-10-CM

## 2018-08-20 DIAGNOSIS — L85.3 DRY SKIN: Primary | ICD-10-CM

## 2018-08-20 DIAGNOSIS — Z96.651 STATUS POST TOTAL RIGHT KNEE REPLACEMENT: ICD-10-CM

## 2018-08-20 DIAGNOSIS — E11.22 TYPE 2 DIABETES MELLITUS WITH STAGE 1 CHRONIC KIDNEY DISEASE, WITHOUT LONG-TERM CURRENT USE OF INSULIN (H): Chronic | ICD-10-CM

## 2018-08-20 DIAGNOSIS — N18.1 TYPE 2 DIABETES MELLITUS WITH STAGE 1 CHRONIC KIDNEY DISEASE, WITHOUT LONG-TERM CURRENT USE OF INSULIN (H): Chronic | ICD-10-CM

## 2018-08-20 DIAGNOSIS — E78.5 HYPERLIPIDEMIA WITH TARGET LDL LESS THAN 100: Chronic | ICD-10-CM

## 2018-08-20 DIAGNOSIS — I10 HYPERTENSION GOAL BP (BLOOD PRESSURE) < 130/80: Chronic | ICD-10-CM

## 2018-08-20 DIAGNOSIS — R63.0 POOR APPETITE: ICD-10-CM

## 2018-08-20 DIAGNOSIS — R50.81 FEVER IN OTHER DISEASES: ICD-10-CM

## 2018-08-20 PROCEDURE — 99214 OFFICE O/P EST MOD 30 MIN: CPT | Performed by: FAMILY MEDICINE

## 2018-08-20 RX ORDER — OXYCODONE AND ACETAMINOPHEN 5; 325 MG/1; MG/1
1 TABLET ORAL EVERY 6 HOURS PRN
Qty: 63 TABLET | Refills: 0 | Status: SHIPPED | OUTPATIENT
Start: 2018-08-20 | End: 2018-09-17

## 2018-08-20 RX ORDER — LANOLIN ALCOHOL/MO/W.PET/CERES
CREAM (GRAM) TOPICAL 2 TIMES DAILY
Qty: 30 G | Refills: 11 | Status: SHIPPED | OUTPATIENT
Start: 2018-08-20 | End: 2018-08-20

## 2018-08-20 RX ORDER — LANOLIN ALCOHOL/MO/W.PET/CERES
CREAM (GRAM) TOPICAL 2 TIMES DAILY
Qty: 30 G | Refills: 11 | Status: SHIPPED | OUTPATIENT
Start: 2018-08-20 | End: 2019-05-03

## 2018-08-20 NOTE — PATIENT INSTRUCTIONS
(L85.3) Dry skin  (primary encounter diagnosis)  Comment:    Plan: mineral oil-white petrolatum (MINERIN/EUCERIN)         CREA cream             (E11.9) Controlled type 2 diabetes mellitus without complication, without long-term current use of insulin (H)  Comment:    Plan:      (E78.5) Hyperlipidemia with target LDL less than 100  Comment:    Plan:      (E11.22,  N18.1) Type 2 diabetes mellitus with stage 1 chronic kidney disease, without long-term current use of insulin (H)  Comment:    Plan:      (I10) Hypertension goal BP (blood pressure) < 130/80  Comment:    Plan:      (R63.0) Poor appetite  Comment:    Plan: Nutritional Supplements (GLUCERNA OS) LIQD             (R50.81) Fever in other diseases  Comment:    Plan: CBC with platelets, Erythrocyte sedimentation         rate auto

## 2018-08-20 NOTE — MR AVS SNAPSHOT
After Visit Summary   8/20/2018    Chivo Urias    MRN: [id number]           Patient Information     Date Of Birth          1945        Visit Information        Provider Department      8/20/2018 10:30 AM Darrin Hernandez MD; [employer] [facility]        Today's Diagnoses     Dry skin    -  1    Controlled type 2 diabetes mellitus without complication, without long-term current use of insulin (H)        Hyperlipidemia with target LDL less than 100        Type 2 diabetes mellitus with stage 1 chronic kidney disease, without long-term current use of insulin (H)        Hypertension goal BP (blood pressure) < 130/80        Poor appetite        Fever in other diseases        Status post total right knee replacement          Care Instructions    (L85.3) Dry skin  (primary encounter diagnosis)  Comment:    Plan: mineral oil-white petrolatum (MINERIN/EUCERIN)         CREA cream             (E11.9) Controlled type 2 diabetes mellitus without complication, without long-term current use of insulin (H)  Comment:    Plan:      (E78.5) Hyperlipidemia with target LDL less than 100  Comment:    Plan:      (E11.22,  N18.1) Type 2 diabetes mellitus with stage 1 chronic kidney disease, without long-term current use of insulin (H)  Comment:    Plan:      (I10) Hypertension goal BP (blood pressure) < 130/80  Comment:    Plan:      (R63.0) Poor appetite  Comment:    Plan: Nutritional Supplements (GLUCERNA OS) LIQD             (R50.81) Fever in other diseases  Comment:    Plan: CBC with platelets, Erythrocyte sedimentation         rate auto                             Follow-ups after your visit        Follow-up notes from your care team     Return in about 4 weeks (around 9/17/2018).      Who to contact     If you have questions or need follow up information about today's clinic visit or your schedule please contact [facility]  RD directly at [phone].  Normal or non-critical lab and imaging results will be communicated to you by MyChart, letter or phone within 4 business days after the clinic has received the results. If you do not hear from us within 7 days, please contact the clinic through MyChart or phone. If you have a critical or abnormal lab result, we will notify you by phone as soon as possible.  Submit refill requests through [employer] or call your pharmacy and they will forward the refill request to us. Please allow 3 business days for your refill to be completed.          Additional Information About Your Visit        Care EveryWhere ID     This is your Care EveryWhere ID. This could be used by other organizations to access your [facility] medical records  [id number]        Your Vitals Were     Pulse Temperature Respirations Pulse Oximetry BMI (Body Mass Index)       68 96.9  F (36.1  C) (Tympanic) 16 98% 22.78 kg/m2        Blood Pressure from Last 3 Encounters:   08/20/18 112/64   07/30/18 118/66   07/23/18 124/72    Weight from Last 3 Encounters:   08/20/18 175 lb (79.4 kg)   07/30/18 173 lb (78.5 kg)   07/23/18 175 lb (79.4 kg)              We Performed the Following     CBC with platelets     Erythrocyte sedimentation rate auto          Today's Medication Changes          These changes are accurate as of 8/20/18 10:57 AM.  If you have any questions, ask your nurse or doctor.               Start taking these medicines.        Dose/Directions    GLUCERNA OS Liqd   Used for:  Poor appetite   Started by:  Darrin Hernandez MD        Dose:  237 mL   Take 237 mLs by mouth 2 times daily   Quantity:  60 each   Refills:  1       mineral oil-white petrolatum Crea cream   Used for:  Dry skin   Started by:  Darrin Hernandez MD        Apply topically 2 times daily   Quantity:  30 g   Refills:  11            Where to get your medicines      Some of these will need a paper prescription and others can be bought  over the counter.  Ask your nurse if you have questions.     Bring a paper prescription for each of these medications     GLUCERNA OS Liqd    mineral oil-white petrolatum Crea cream    oxyCODONE-acetaminophen 5-325 MG per tablet               Information about OPIOIDS     PRESCRIPTION OPIOIDS: WHAT YOU NEED TO KNOW   We gave you an opioid (narcotic) pain medicine. It is important to manage your pain, but opioids are not always the best choice. You should first try all the other options your care team gave you. Take this medicine for as short a time (and as few doses) as possible.    Some activities can increase your pain, such as bandage changes or therapy sessions. It may help to take your pain medicine 30 to 60 minutes before these activities. Reduce your stress by getting enough sleep, working on hobbies you enjoy and practicing relaxation or meditation. Talk to your care team about ways to manage your pain beyond prescription opioids.    These medicines have risks:    DO NOT drive when on new or higher doses of pain medicine. These medicines can affect your alertness and reaction times, and you could be arrested for driving under the influence (DUI). If you need to use opioids long-term, talk to your care team about driving.    DO NOT operate heavy machinery    DO NOT do any other dangerous activities while taking these medicines.    DO NOT drink any alcohol while taking these medicines.     If the opioid prescribed includes acetaminophen, DO NOT take with any other medicines that contain acetaminophen. Read all labels carefully. Look for the word  acetaminophen  or  Tylenol.  Ask your pharmacist if you have questions or are unsure.    You can get addicted to pain medicines, especially if you have a history of addiction (chemical, alcohol or substance dependence). Talk to your care team about ways to reduce this risk.    All opioids tend to cause constipation. Drink plenty of water and eat foods that have a lot  of fiber, such as fruits, vegetables, prune juice, apple juice and high-fiber cereal. Take a laxative (Miralax, milk of magnesia, Colace, Senna) if you don t move your bowels at least every other day. Other side effects include upset stomach, sleepiness, dizziness, throwing up, tolerance (needing more of the medicine to have the same effect), physical dependence and slowed breathing.    Store your pills in a secure place, locked if possible. We will not replace any lost or stolen medicine. If you don t finish your medicine, please throw away (dispose) as directed by your pharmacist. The [place] [employer] has more information about safe disposal: [email or web address]         Primary Care Provider Office Phone # Fax #    Darrin Hernandez [phone]       [address] [address]        Equal Access to Services     NAYELY WEBB : Hadii merry ku hadasho Soomaali, waaxda luqadaha, qaybta kaalmada adeegyada, mundo solis . So [facility] [phone].    ATENCIÓN: Si habla español, tiene a pizano disposición servicios gratuitos de asistencia lingüística. Llame al [phone].    We comply with applicable federal civil rights laws and [place] laws. We do not discriminate on the basis of race, color, national origin, age, disability, sex, sexual orientation, or gender identity.            Thank you!     Thank you for choosing [facility]  for your care. Our goal is always to provide you with excellent care. Hearing back from our patients is one way we can continue to improve our services. Please take a few minutes to complete the written survey that you may receive in the mail after your visit with us. Thank you!             Your Updated Medication List - Protect others around you: Learn how to safely use, store and throw away your medicines at www.disposemymeds.org.           This list is accurate as of 8/20/18 10:57 AM.  Always use your most recent med list.                   Brand Name Dispense Instructions for use Diagnosis    acetaminophen 500 MG tablet    TYLENOL    100 tablet    Take 1-2 tablets (500-1,000 mg) by mouth every 6 hours as needed    Foot pain, left       aspirin 81 MG chewable tablet     108 tablet    Take 1 tablet (81 mg) by mouth daily    Essential hypertension with goal blood pressure less than 130/80, Hyperlipidemia with target LDL less than 100       atorvastatin 10 MG tablet    LIPITOR    30 tablet    Take 1 tablet (10 mg) by mouth daily    Pure hypercholesterolemia       blood glucose monitoring lancets     100 each    1 each 2 times daily    Diabetic polyneuropathy associated with diabetes mellitus due to underlying condition (H)       blood glucose monitoring meter device kit     1 kit    Use to test blood sugars TWICE DAILY  times daily or as directed.    Type 2 diabetes mellitus without complication, without long-term current use of insulin (H)       blood glucose monitoring test strip    MIKHAIL CONTOUR NEXT    100 each    1 strip by In Vitro route daily    Diabetic polyneuropathy associated with diabetes mellitus due to underlying condition (H)       cholecalciferol 1000 units Tabs    VITAMIN D-1000 MAX ST    90 tablet    Take 3 tablets by mouth daily    Screening for diabetic peripheral neuropathy, Cramp of both lower extremities       finasteride 5 MG tablet    PROSCAR    90 tablet    Take 1 tablet (5 mg) by mouth daily    Benign non-nodular prostatic hyperplasia without lower urinary tract symptoms       glimepiride 1 MG tablet    AMARYL    30 tablet    Take 1 tablet (1 mg) by mouth every morning (before breakfast)    Controlled type 2 diabetes mellitus without complication, without long-term current use of insulin (H)       GLUCERNA OS Liqd     60 each    Take 237 mLs by mouth 2 times daily    Poor appetite       lidocaine 5 % Patch    LIDODERM     30 patch    On am off hour of sleep    Hx of total knee arthroplasty, right       lisinopril 2.5 MG tablet    PRINIVIL/Zestril    30 tablet    Take 1 tablet (2.5 mg) by mouth daily    Type 2 diabetes mellitus with stage 1 chronic kidney disease, without long-term current use of insulin (H)       metFORMIN 500 MG 24 hr tablet    GLUCOPHAGE-XR    60 tablet    TAKE 1 TABLET BY MOUTH TWICE DAILY WITH MEALS    Controlled type 2 diabetes mellitus without complication, without long-term current use of insulin (H)       mineral oil-white petrolatum Crea cream     30 g    Apply topically 2 times daily    Dry skin       oxyCODONE-acetaminophen 5-325 MG per tablet    PERCOCET    63 tablet    Take 1 tablet by mouth every 6 hours as needed for pain    Status post total right knee replacement       senna-docusate 8.6-50 MG per tablet    SENOKOT-S;PERICOLACE    60 tablet    Take 2 tablets by mouth 2 times daily    Drug-induced constipation       tamsulosin 0.4 MG capsule    FLOMAX    90 capsule    Take 1 capsule (0.4 mg) by mouth daily    Benign non-nodular prostatic hyperplasia without lower urinary tract symptoms       Wrist Blood Pressure Monitor Misc     1 each    1 each 2 times daily as needed    Benign essential hypertension

## 2018-08-20 NOTE — PROGRESS NOTES
SUBJECTIVE:     Chivo Urias is a 72 year old male who presents to clinic today for the following health issues:            Lab result,     Labs from 7/30    RIGHT KNEE PAIN    TOTAL KNEE ARTHROPLASTY HAVING SIGNIFICANT PAIN     TREATMENT PROGNOSIS BENEFITS AND RISKS DISCUSSED     MEDICATION RISKS SIDE EFFECTS BENEFITS AND RISKS DISCUSSED         (L85.3) Dry skin  (primary encounter diagnosis)    Comment:      Plan: mineral oil-white petrolatum (MINERIN/EUCERIN)           CREA cream, DISCONTINUED: mineral oil-white           petrolatum (MINERIN/EUCERIN) CREA cream           TWICE DAILY PRN         (E11.9) Controlled type 2 diabetes mellitus without complication, without long-term current use of insulin (H)    Comment:      Plan:  CONTROLLED DIET AND MEDICATIONS          (E78.5) Hyperlipidemia with target LDL less than 100    Comment:      Plan:  MEDITERRANEAN DIET AND MEDICATIONS         (E11.22,  N18.1) Type 2 diabetes mellitus with stage 1 chronic kidney disease, without long-term current use of insulin (H)    Comment:      Plan:  WELL CONTROLLED        (I10) Hypertension goal BP (blood pressure) < 130/80    Comment:      Plan:  CONTROLLED CURRENT MEDICATIONS         (R63.0) Poor appetite    Comment:      Plan: Nutritional Supplements (GLUCERNA OS) LIQD,           DISCONTINUED: Nutritional Supplements (GLUCERNA          OS) LIQD           TEMPORARY X 1-2 MONTHS     SHOULD GET BETTER OFF PAIN MEDICATIONS         (R50.81) Fever in other diseases    Comment:      Plan: CBC with platelets, Erythrocyte sedimentation           rate auto                   (Z96.651) Status post total right knee replacement    Comment:      Plan: oxyCODONE-acetaminophen (PERCOCET) 5-325 MG per          tablet           LONG SLOW TAPER  NEEDED     SIDE EFFECTS BENEFITS AND RISKS DISCUSSED   .EDUARDA LAMBERT MD .8/20/2018 1:12 PM .August 20, 2018        There are no preventive care reminders to display for this  patient.      .  Current Outpatient Prescriptions   Medication Sig Dispense Refill     acetaminophen (TYLENOL) 500 MG tablet Take 1-2 tablets (500-1,000 mg) by mouth every 6 hours as needed 100 tablet 11     aspirin 81 MG chewable tablet Take 1 tablet (81 mg) by mouth daily 108 tablet 3     atorvastatin (LIPITOR) 10 MG tablet Take 1 tablet (10 mg) by mouth daily 30 tablet 11     blood glucose monitoring (MIKHAIL CONTOUR MONITOR) meter device kit Use to test blood sugars TWICE DAILY  times daily or as directed. 1 kit 0     blood glucose monitoring (MIKHAIL CONTOUR NEXT) test strip 1 strip by In Vitro route daily 100 each 11     blood glucose monitoring (MIKHAIL MICROLET) lancets 1 each 2 times daily 100 each 11     Blood Pressure Monitoring (WRIST BLOOD PRESSURE MONITOR) MISC 1 each 2 times daily as needed 1 each 0     cholecalciferol (VITAMIN D-1000 MAX ST) 1000 units TABS Take 3 tablets by mouth daily 90 tablet 11     finasteride (PROSCAR) 5 MG tablet Take 1 tablet (5 mg) by mouth daily 90 tablet 3     glimepiride (AMARYL) 1 MG tablet Take 1 tablet (1 mg) by mouth every morning (before breakfast) 30 tablet 11     lidocaine (LIDODERM) 5 % Patch On am off hour of sleep 30 patch 3     lisinopril (PRINIVIL/ZESTRIL) 2.5 MG tablet Take 1 tablet (2.5 mg) by mouth daily 30 tablet 11     metFORMIN (GLUCOPHAGE-XR) 500 MG 24 hr tablet TAKE 1 TABLET BY MOUTH TWICE DAILY WITH MEALS 60 tablet 11     mineral oil-white petrolatum (MINERIN/EUCERIN) CREA cream Apply topically 2 times daily 30 g 11     Nutritional Supplements (GLUCERNA OS) LIQD Take 237 mLs by mouth 2 times daily 60 each 1     oxyCODONE-acetaminophen (PERCOCET) 5-325 MG per tablet Take 1 tablet by mouth every 6 hours as needed for pain 63 tablet 0     senna-docusate (SENOKOT-S;PERICOLACE) 8.6-50 MG per tablet Take 2 tablets by mouth 2 times daily 60 tablet 1     tamsulosin (FLOMAX) 0.4 MG capsule Take 1 capsule (0.4 mg) by mouth daily 90 capsule 3        No Known  Allergies    Immunization History   Administered Date(s) Administered     Pneumococcal 23 valent 03/05/2013     Tetanus 01/01/2010         reports that he does not drink alcohol.      reports that he does not use illicit drugs.    family history includes Family History Negative in his father and mother.    indicated that his mother is alive. He indicated that his father is alive.      has no past surgical history on file.     reports that he does not engage in sexual activity.  .  Pediatric History   Patient Guardian Status     Not on file.     Other Topics Concern     Parent/Sibling W/ Cabg, Mi Or Angioplasty Before 65f 55m? No     Social History Narrative    Surgical History  Return To Top     Status Surgery Time Frame Comment Record Date     Inactive  NONE      11/14/2007          --------------------------------------------------------------------------------    Food Allergy  Return To Top     Allergen Reaction Comment Record Date     * No known food allergies      11/14/2007          --------------------------------------------------------------------------------    Drug Allergy  Return To Top     Allergen Reaction Comment Record Date     * No known drug allergies      5/15/2007          --------------------------------------------------------------------------------    Environment Allergy  Return To Top     Allergen Reaction Comment Record Date     * No known environmental allergies      11/14/2007          --------------------------------------------------------------------------------    Social History  Return To Top     Question Answer Comment Record Date     Marital status      11/14/2007      Advance Directive or Living Will  No    5/18/2010      Emotional Abuse  No    7/1/2011      Exercise  Yes SOMETIMES  walks alot.  11/14/2007      Caffeine  Yes  Tea  1/11/2008      Physical Abuse  No    7/1/2011      Sealtbelts  Yes    11/14/2007      Sexual Abuse  No     7/1/2011      Breast/Testicle Self  Check  Yes    11/14/2007      Number of children  7  4 GIRLS AND 3 BOYS  7/17/2007      Living arrangements  Apartment/Condo    11/14/2007      Number of children in household  0    11/14/2007      Number of adults in household  1    11/14/2007      Education level  High School Graduate    11/14/2007      Employment  Currently unemployed    11/14/2007      Tobacco history  Has never smoked or chewed tobacco    8/29/2008      Alcohol history  Never drinks alcohol    11/14/2007      Has the patient ever used illegal drugs?  Has never used illegal drugs    7/1/2011      Has the patient used marijuana?  No    7/1/2011      Has the patient used cocaine?  No    7/1/2011          --------------------------------------------------------------------------------    Medical History Return To Top     Status Diagnosis Time Frame Comment Record Date     Active (729.1) - C - Myalgia      8/11/2011      Active (726.79) - C - Sub-Achilles bursitis      8/11/2011      Active (700) - C - Corn/callus    o  7/1/2011      Active (272.4) - C - Hyperlipidemia      7/1/2011      Active (V81.2) - C - SCREEN-CARDIOVASC NEC      6/21/2011      Active (V82.9) - C - Screening, vitamin d deficiency      4/8/2011      Active (V77.91) - C - Screening, lipids      4/8/2011      Active (V76.51) - C - Screening, colon      4/8/2011      Active (780.79) - C - Fatigue      5/18/2010      Active V76.44 Screening, prostate      10/7/2009      Active (780.79) - C - Fatigue      10/7/2009      Active 477.9 Rhinitis, allergic, cause unspecified      8/29/2008      Active (782.0) - C - Numbness    RIGHT LEG  6/19/2008      Active 780.79 Fatigue      2/20/2008      Active (784.0) - C - Headache, unspec.      2/1/2008      Active 724.3 Sciatica    chronic, with worsening weakness and sensory changes in S1 distribution  1/11/2008      Active 608.4 MALE GEN INFLAM DIS OT      11/20/2007      Active 608.9 MALE GENITAL DIS UNSPEC      11/14/2007      Active V78.3  Screening, HGB      11/14/2007      Active 355.9 MONONEURITIS UNSPEC      7/17/2007      Active 365.9 UNSPECIFIED GLAUCOMA      5/16/2007      Active (719.46) - C - Knee pain      5/16/2007      Active (729.5) - C - limb pain    both legs muscle aches  5/16/2007      Active (724.2) - C - Low back pain      5/16/2007      Inactive  (780.79) - C - Fatigue    IMPROVED   10/22/2009      Inactive  (780.79) - C - Fatigue    IMPROVED  6/19/2008      Inactive  782.0 Numbness      5/16/2007          ----------------------------------------------------        --------------------------------------------------------------------------------    Family History  Return To Top     Status Relationship Disease Comment Record Date     Alive Mother      11/14/2007      Alive Father      11/14/2007          --------------------------------------------------------------------------------                 reports that he has never smoked. He has never used smokeless tobacco.    Medical, social, surgical, and family histories reviewed.    Labs reviewed in EPIC  Patient Active Problem List   Diagnosis     Health Care Home     Myalgia and myositis     Allergic rhinitis     Sciatica     Glaucoma     Foot pain, left     Controlled type 2 diabetes mellitus without complication (H)     Hyperlipidemia with target LDL less than 100     Vitamin D insufficiency     DDD (degenerative disc disease), lumbar     Stage 1 chronic renal impairment associated with type 2 diabetes mellitus (H)     Comprehensive diabetic foot examination, type 2 DM, encounter for (H)     Low HDL (under 40)     Hypertension goal BP (blood pressure) < 130/80     CKD (chronic kidney disease) stage 2, GFR 60-89 ml/min     Right hip pain     Type 2 diabetes, HbA1C goal < 8% (H)     ACP (advance care planning)     Enthesopathy of right hip region     History reviewed. No pertinent surgical history.    Social History   Substance Use Topics     Smoking status: Never Smoker      Smokeless tobacco: Never Used     Alcohol use No     Family History   Problem Relation Age of Onset     Family History Negative Mother      Family History Negative Father          Current Outpatient Prescriptions   Medication Sig Dispense Refill     acetaminophen (TYLENOL) 500 MG tablet Take 1-2 tablets (500-1,000 mg) by mouth every 6 hours as needed 100 tablet 11     aspirin 81 MG chewable tablet Take 1 tablet (81 mg) by mouth daily 108 tablet 3     atorvastatin (LIPITOR) 10 MG tablet Take 1 tablet (10 mg) by mouth daily 30 tablet 11     blood glucose monitoring (MIKHAIL CONTOUR MONITOR) meter device kit Use to test blood sugars TWICE DAILY  times daily or as directed. 1 kit 0     blood glucose monitoring (MIKHAIL CONTOUR NEXT) test strip 1 strip by In Vitro route daily 100 each 11     blood glucose monitoring (MIKHAIL MICROLET) lancets 1 each 2 times daily 100 each 11     Blood Pressure Monitoring (WRIST BLOOD PRESSURE MONITOR) MISC 1 each 2 times daily as needed 1 each 0     cholecalciferol (VITAMIN D-1000 MAX ST) 1000 units TABS Take 3 tablets by mouth daily 90 tablet 11     finasteride (PROSCAR) 5 MG tablet Take 1 tablet (5 mg) by mouth daily 90 tablet 3     glimepiride (AMARYL) 1 MG tablet Take 1 tablet (1 mg) by mouth every morning (before breakfast) 30 tablet 11     lidocaine (LIDODERM) 5 % Patch On am off hour of sleep 30 patch 3     lisinopril (PRINIVIL/ZESTRIL) 2.5 MG tablet Take 1 tablet (2.5 mg) by mouth daily 30 tablet 11     metFORMIN (GLUCOPHAGE-XR) 500 MG 24 hr tablet TAKE 1 TABLET BY MOUTH TWICE DAILY WITH MEALS 60 tablet 11     mineral oil-white petrolatum (MINERIN/EUCERIN) CREA cream Apply topically 2 times daily 30 g 11     Nutritional Supplements (GLUCERNA OS) LIQD Take 237 mLs by mouth 2 times daily 60 each 1     oxyCODONE-acetaminophen (PERCOCET) 5-325 MG per tablet Take 1 tablet by mouth every 6 hours as needed for pain 63 tablet 0     senna-docusate (SENOKOT-S;PERICOLACE) 8.6-50 MG per tablet  Take 2 tablets by mouth 2 times daily 60 tablet 1     tamsulosin (FLOMAX) 0.4 MG capsule Take 1 capsule (0.4 mg) by mouth daily 90 capsule 3       Recent Labs   Lab Test  07/30/18   0820  06/28/18   0823  09/29/17   0832  06/08/17   0808  12/08/16   0920  11/10/16   1028   03/04/14   1008   A1C  6.6*   --   7.1*  7.6*   --   6.9*   < >  6.1*   LDL  80   --   39   --    --   62   < >  111   HDL  40   --   40   --    --   42   < >  44   TRIG  189*   --   306*   --    --   216*   < >  137   ALT  22   --   24  23   --   32   < >  28   CR  0.96  1.03  0.90  0.88   --   1.08   < >  1.10   GFRESTIMATED  77  70  83  85   --   67   < >  66   GFRESTBLACK  >90  85  >90  >90   GFR Calc     --   81   < >  80   POTASSIUM  4.1  4.3  4.2  4.0   --   4.2   < >  4.3   TSH   --    --    --    --   0.99   --    --   1.02    < > = values in this interval not displayed.        BP Readings from Last 6 Encounters:   08/20/18 112/64   07/30/18 118/66   07/23/18 124/72   06/28/18 112/64   12/19/17 124/72   12/08/17 138/72       Wt Readings from Last 3 Encounters:   08/20/18 175 lb (79.4 kg)   07/30/18 173 lb (78.5 kg)   07/23/18 175 lb (79.4 kg)         Positive symptoms or findings indicated by bold designation:     ROS: 10 point ROS neg other than the symptoms noted above in the HPI.except  has Health Care Home; Myalgia and myositis; Allergic rhinitis; Sciatica; Glaucoma; Foot pain, left; Controlled type 2 diabetes mellitus without complication (H); Hyperlipidemia with target LDL less than 100; Vitamin D insufficiency; DDD (degenerative disc disease), lumbar; Stage 1 chronic renal impairment associated with type 2 diabetes mellitus (H); Comprehensive diabetic foot examination, type 2 DM, encounter for (H); Low HDL (under 40); Hypertension goal BP (blood pressure) < 130/80; CKD (chronic kidney disease) stage 2, GFR 60-89 ml/min; Right hip pain; Type 2 diabetes, HbA1C goal < 8% (H); ACP (advance care planning); and  Enthesopathy of right hip region on his problem list.  Review Of Systems  Skin: negative SCAR RIGHT KNEE  Eyes: negative  Ears/Nose/Throat: negative  Respiratory: No shortness of breath, dyspnea on exertion, cough, or hemoptysis  Cardiovascular: negative  Gastrointestinal: poor appetite, nausea and heartburn  Genitourinary: negative  Musculoskeletal: arthritis and joint pain RIGHT TOTAL KNEE ARTHROPLASTY   Neurologic: negative  Psychiatric: negative  Hematologic/Lymphatic/Immunologic: negative  Endocrine: diabetes        PE:  /64  Pulse 68  Temp 96.9  F (36.1  C) (Tympanic)  Resp 16  Wt 175 lb (79.4 kg)  SpO2 98%  BMI 22.78 kg/m2 Body mass index is 22.78 kg/(m^2).    Constitutional: general appearance, well nourished, well developed, in no acute distress, well developed, appears stated age, normal body habitus,      Eyes:; The patient has normal eyelids sclerae and conjunctivae :      Ears/Nose/Throat: external ear, overall: normal appearance; external nose, overall: benign appearance, normal moujth gums and lips       Neck: thyroid, overall: normal size, normal consistency, nontender,      Respiratory:  palpation of chest, overall: normal excursion,    Clear to percussion and auscultation     NO Tachypnea    NORMAL  Color      Cardiovascular:  Good color with no peripheral edema    Regular sinus rhythm without murmur.  Physiologic heart sounds   Heart is unelarged  .   Chest/Breast: normal shape       Abdominal exam,  Liver and spleen are  unenlarged        Tenderness    Scars        Urogenital; no renal, flank or bladder  tenderness;      Lymphatic: neck nodes,      Other nodes     Musculoskeletal:  Brief ortho exam normal except:       Integument: inspection of skin, no rash, lesions; and, palpation, no induration, no tenderness.       Neurologic mental status, overall: alert and oriented; gait, no ataxia, no unsteadiness; coordination, no tremors; cranial nerves, overall: normal motor, overall:  normal bulk, tone.       Psychiatric: orientation/consciousness, overall: oriented to person, place and time; behavior/psychomotor activity, no tics, normal psychomotor activity; mood and affect, overall: normal mood and affect; appearance, overall: well-groomed, good eye contact; speech, overall: normal quality, no aphasia and normal quality, quantity, intact.      Diagnostic Test Results:  Results for orders placed or performed in visit on 07/30/18   HEMOGLOBIN A1C   Result Value Ref Range    Hemoglobin A1C 6.6 (H) 0 - 5.6 %   Albumin Random Urine Quantitative with Creat Ratio   Result Value Ref Range    Creatinine Urine 228 mg/dL    Albumin Urine mg/L 11 mg/L    Albumin Urine mg/g Cr 4.82 0 - 17 mg/g Cr   Lipid panel reflex to direct LDL Fasting   Result Value Ref Range    Cholesterol 158 <200 mg/dL    Triglycerides 189 (H) <150 mg/dL    HDL Cholesterol 40 >39 mg/dL    LDL Cholesterol Calculated 80 <100 mg/dL    Non HDL Cholesterol 118 <130 mg/dL   ALT   Result Value Ref Range    ALT 22 0 - 70 U/L   Basic metabolic panel   Result Value Ref Range    Sodium 139 133 - 144 mmol/L    Potassium 4.1 3.4 - 5.3 mmol/L    Chloride 102 94 - 109 mmol/L    Carbon Dioxide 27 20 - 32 mmol/L    Anion Gap 10 3 - 14 mmol/L    Glucose 132 (H) 70 - 99 mg/dL    Urea Nitrogen 14 7 - 30 mg/dL    Creatinine 0.96 0.66 - 1.25 mg/dL    GFR Estimate 77 >60 mL/min/1.7m2    GFR Estimate If Black >90 >60 mL/min/1.7m2    Calcium 9.2 8.5 - 10.1 mg/dL         ICD-10-CM    1. Dry skin L85.3 mineral oil-white petrolatum (MINERIN/EUCERIN) CREA cream     DISCONTINUED: mineral oil-white petrolatum (MINERIN/EUCERIN) CREA cream   2. Controlled type 2 diabetes mellitus without complication, without long-term current use of insulin (H) E11.9    3. Hyperlipidemia with target LDL less than 100 E78.5    4. Type 2 diabetes mellitus with stage 1 chronic kidney disease, without long-term current use of insulin (H) E11.22     N18.1    5. Hypertension goal BP  (blood pressure) < 130/80 I10    6. Poor appetite R63.0 Nutritional Supplements (GLUCERNA OS) LIQD     DISCONTINUED: Nutritional Supplements (GLUCERNA OS) LIQD   7. Fever in other diseases R50.81 CBC with platelets     Erythrocyte sedimentation rate auto   8. Status post total right knee replacement Z96.651 oxyCODONE-acetaminophen (PERCOCET) 5-325 MG per tablet        .  Side effects benefits and risks thoroughly discussed. .he may come in early if unimproved or getting worse      Please drink 2 glasses of water prior to meals and walk 15-30 minutes after meals    I spent  25 MINUTES SPENT  with patient discussing the following issues   The primary encounter diagnosis was Dry skin. Diagnoses of Controlled type 2 diabetes mellitus without complication, without long-term current use of insulin (H), Hyperlipidemia with target LDL less than 100, Type 2 diabetes mellitus with stage 1 chronic kidney disease, without long-term current use of insulin (H), Hypertension goal BP (blood pressure) < 130/80, Poor appetite, Fever in other diseases, and Status post total right knee replacement were also pertinent to this visit. over half of which involved counseling and coordination of care.    Patient Instructions   (L85.3) Dry skin  (primary encounter diagnosis)  Comment:    Plan: mineral oil-white petrolatum (MINERIN/EUCERIN)         CREA cream             (E11.9) Controlled type 2 diabetes mellitus without complication, without long-term current use of insulin (H)  Comment:    Plan:      (E78.5) Hyperlipidemia with target LDL less than 100  Comment:    Plan:      (E11.22,  N18.1) Type 2 diabetes mellitus with stage 1 chronic kidney disease, without long-term current use of insulin (H)  Comment:    Plan:      (I10) Hypertension goal BP (blood pressure) < 130/80  Comment:    Plan:      (R63.0) Poor appetite  Comment:    Plan: Nutritional Supplements (GLUCERNA OS) LIQD             (R50.81) Fever in other diseases  Comment:     Plan: CBC with platelets, Erythrocyte sedimentation         rate auto                         ALL THE ABOVE PROBLEMS ARE STABLE AND MED CHANGES AS NOTED    Diet:  MEDITERRANEAN DIET     Exercise:  RIGHT KNEE PAIN AND WALKING AS WELL  TOLERATED with ANTALGIA   Exercises Range of motion, balance, isometric, and strengthening exercises 30 repetitions twice daily of involved joints      .EDUARDA LAMBERT MD 8/20/2018 1:12 PM  August 20, 2018

## 2018-08-21 ENCOUNTER — TRANSFERRED RECORDS (OUTPATIENT)
Dept: HEALTH INFORMATION MANAGEMENT | Facility: CLINIC | Age: 73
End: 2018-08-21

## 2018-09-11 ENCOUNTER — PATIENT OUTREACH (OUTPATIENT)
Dept: GERIATRIC MEDICINE | Facility: CLINIC | Age: 73
End: 2018-09-11

## 2018-09-11 NOTE — PROGRESS NOTES
[facility] Care Coordination Contact    [facility] Six-Month Telephone Assessment    6 month telephone assessment completed on 9/11/18.    ER visits: No  Hospitalizations: Yes -  [facility] 7/3/18 s/p total knee replacement  TCU stays: No  Significant health status changes: No  Falls/Injuries: No  ADL/IADL changes: No  Changes in services: No    Caregiver Assessment follow up:  N/A  Goals: See POC in chart for goal progress documentation.     Will see member in 6 months for an annual health risk assessment.   Encouraged member to call CC with any questions or concerns in the meantime.   Laura Romo RN BSN PHN  [facility] Care Coordinator  [phone]  Fax:[phone]

## 2018-09-17 ENCOUNTER — OFFICE VISIT (OUTPATIENT)
Dept: FAMILY MEDICINE | Facility: CLINIC | Age: 73
End: 2018-09-17
Payer: COMMERCIAL

## 2018-09-17 VITALS
TEMPERATURE: 97.4 F | SYSTOLIC BLOOD PRESSURE: 106 MMHG | WEIGHT: 175 LBS | HEART RATE: 69 BPM | OXYGEN SATURATION: 100 % | RESPIRATION RATE: 16 BRPM | BODY MASS INDEX: 22.78 KG/M2 | DIASTOLIC BLOOD PRESSURE: 64 MMHG

## 2018-09-17 DIAGNOSIS — E11.00 TYPE 2 DIABETES MELLITUS WITH HYPEROSMOLARITY WITHOUT COMA, WITHOUT LONG-TERM CURRENT USE OF INSULIN (H): ICD-10-CM

## 2018-09-17 DIAGNOSIS — M25.562 CHRONIC PAIN OF LEFT KNEE: ICD-10-CM

## 2018-09-17 DIAGNOSIS — G89.29 CHRONIC PAIN OF LEFT KNEE: ICD-10-CM

## 2018-09-17 DIAGNOSIS — E78.5 HYPERLIPIDEMIA WITH TARGET LDL LESS THAN 100: Chronic | ICD-10-CM

## 2018-09-17 DIAGNOSIS — J30.1 CHRONIC SEASONAL ALLERGIC RHINITIS DUE TO POLLEN: Primary | ICD-10-CM

## 2018-09-17 DIAGNOSIS — M79.672 FOOT PAIN, LEFT: ICD-10-CM

## 2018-09-17 DIAGNOSIS — M25.551 RIGHT HIP PAIN: ICD-10-CM

## 2018-09-17 DIAGNOSIS — N18.2 CKD (CHRONIC KIDNEY DISEASE) STAGE 2, GFR 60-89 ML/MIN: ICD-10-CM

## 2018-09-17 DIAGNOSIS — H10.10 SEASONAL ALLERGIC CONJUNCTIVITIS: ICD-10-CM

## 2018-09-17 DIAGNOSIS — I10 HYPERTENSION GOAL BP (BLOOD PRESSURE) < 130/80: Chronic | ICD-10-CM

## 2018-09-17 DIAGNOSIS — E55.9 VITAMIN D INSUFFICIENCY: ICD-10-CM

## 2018-09-17 PROCEDURE — 99214 OFFICE O/P EST MOD 30 MIN: CPT | Performed by: FAMILY MEDICINE

## 2018-09-17 RX ORDER — MONTELUKAST SODIUM 10 MG/1
10 TABLET ORAL AT BEDTIME
Qty: 30 TABLET | Refills: 1 | Status: SHIPPED | OUTPATIENT
Start: 2018-09-17 | End: 2018-10-19

## 2018-09-17 RX ORDER — MOMETASONE FUROATE MONOHYDRATE 50 UG/1
2 SPRAY, METERED NASAL DAILY
Qty: 1 BOX | Refills: 11 | Status: SHIPPED | OUTPATIENT
Start: 2018-09-17 | End: 2019-05-03

## 2018-09-17 RX ORDER — CETIRIZINE HYDROCHLORIDE 10 MG/1
10 TABLET ORAL EVERY EVENING
Qty: 30 TABLET | Refills: 3 | Status: SHIPPED | OUTPATIENT
Start: 2018-09-17 | End: 2019-05-03

## 2018-09-17 NOTE — PATIENT INSTRUCTIONS
(J30.1) Chronic seasonal allergic rhinitis due to pollen  (primary encounter diagnosis)  Comment:    Plan: cetirizine (ZYRTEC) 10 MG tablet, montelukast         (SINGULAIR) 10 MG tablet, mometasone (NASONEX)         50 MCG/ACT spray             (H10.45) Seasonal allergic conjunctivitis  Comment:    Plan: cetirizine (ZYRTEC) 10 MG tablet, montelukast         (SINGULAIR) 10 MG tablet, mometasone (NASONEX)         50 MCG/ACT spray             (E78.5) Hyperlipidemia with target LDL less than 100  Comment:    Plan:      (I10) Hypertension goal BP (blood pressure) < 130/80  Comment:    Plan:      (M25.551) Right hip pain  Comment:    Plan: traMADol-acetaminophen (ULTRACET) 37.5-325 MG         per tablet             (M79.672) Foot pain, left  Comment:    Plan: traMADol-acetaminophen (ULTRACET) 37.5-325 MG         per tablet             (E55.9) Vitamin D insufficiency  Comment:    Plan:      (E11.00) Type 2 diabetes mellitus with hyperosmolarity without coma, without long-term current use of insulin (H)  Comment:    Plan: Albumin Random Urine Quantitative with Creat         Ratio             (N18.2) CKD (chronic kidney disease) stage 2, GFR 60-89 ml/min  Comment:    Plan:      (M25.562,  G89.29) Chronic pain of left knee  Comment:    Plan: traMADol-acetaminophen (ULTRACET) 37.5-325 MG         per tablet

## 2018-09-17 NOTE — MR AVS SNAPSHOT
After Visit Summary   9/17/2018    Chivo Urias    MRN: [id number]           Patient Information     Date Of Birth          1945        Visit Information        Provider Department      9/17/2018 10:15 AM Darrin Hernandez MD; [employer] [facility]        Today's Diagnoses     Chronic seasonal allergic rhinitis due to pollen    -  1    Seasonal allergic conjunctivitis        Hyperlipidemia with target LDL less than 100        Hypertension goal BP (blood pressure) < 130/80        Right hip pain        Foot pain, left        Vitamin D insufficiency        Type 2 diabetes mellitus with hyperosmolarity without coma, without long-term current use of insulin (H)        CKD (chronic kidney disease) stage 2, GFR 60-89 ml/min        Chronic pain of left knee          Care Instructions    (J30.1) Chronic seasonal allergic rhinitis due to pollen  (primary encounter diagnosis)  Comment:    Plan: cetirizine (ZYRTEC) 10 MG tablet, montelukast         (SINGULAIR) 10 MG tablet, mometasone (NASONEX)         50 MCG/ACT spray             (H10.45) Seasonal allergic conjunctivitis  Comment:    Plan: cetirizine (ZYRTEC) 10 MG tablet, montelukast         (SINGULAIR) 10 MG tablet, mometasone (NASONEX)         50 MCG/ACT spray             (E78.5) Hyperlipidemia with target LDL less than 100  Comment:    Plan:      (I10) Hypertension goal BP (blood pressure) < 130/80  Comment:    Plan:      (M25.551) Right hip pain  Comment:    Plan: traMADol-acetaminophen (ULTRACET) 37.5-325 MG         per tablet             (M79.672) Foot pain, left  Comment:    Plan: traMADol-acetaminophen (ULTRACET) 37.5-325 MG         per tablet             (E55.9) Vitamin D insufficiency  Comment:    Plan:      (E11.00) Type 2 diabetes mellitus with hyperosmolarity without coma, without long-term current use of insulin (H)  Comment:    Plan: Albumin Random Urine Quantitative with Creat          Ratio             (N18.2) CKD (chronic kidney disease) stage 2, GFR 60-89 ml/min  Comment:    Plan:      (M25.562,  G89.29) Chronic pain of left knee  Comment:    Plan: traMADol-acetaminophen (ULTRACET) 37.5-325 MG         per tablet                               Follow-ups after your visit        Follow-up notes from your care team     Return in about 3 months (around 12/17/2018) for GLYCOHEMOGLOBIN, BASIC METABOLIC PANEL, Diabetes visit.      Who to contact     If you have questions or need follow up information about today's clinic visit or your schedule please contact [facility] directly at [phone].  Normal or non-critical lab and imaging results will be communicated to you by MyChart, letter or phone within 4 business days after the clinic has received the results. If you do not hear from us within 7 days, please contact the clinic through MyChart or phone. If you have a critical or abnormal lab result, we will notify you by phone as soon as possible.  Submit refill requests through [employer] or call your pharmacy and they will forward the refill request to us. Please allow 3 business days for your refill to be completed.          Additional Information About Your Visit        Care EveryWhere ID     This is your Care EveryWhere ID. This could be used by other organizations to access your [place] medical records  [id number]        Your Vitals Were     Pulse Temperature Respirations Pulse Oximetry BMI (Body Mass Index)       69 97.4  F (36.3  C) (Tympanic) 16 100% 22.78 kg/m2        Blood Pressure from Last 3 Encounters:   09/17/18 106/64   08/20/18 112/64   07/30/18 118/66    Weight from Last 3 Encounters:   09/17/18 175 lb (79.4 kg)   08/20/18 175 lb (79.4 kg)   07/30/18 173 lb (78.5 kg)              We Performed the Following     Albumin Random Urine Quantitative with Creat Ratio          Today's Medication Changes          These changes are accurate as of 9/17/18  10:46 AM.  If you have any questions, ask your nurse or doctor.               Start taking these medicines.        Dose/Directions    cetirizine 10 MG tablet   Commonly known as:  zyrTEC   Used for:  Chronic seasonal allergic rhinitis due to pollen, Seasonal allergic conjunctivitis   Started by:  Darrin Hernandez MD        Dose:  10 mg   Take 1 tablet (10 mg) by mouth every evening   Quantity:  30 tablet   Refills:  3       mometasone 50 MCG/ACT spray   Commonly known as:  NASONEX   Used for:  Chronic seasonal allergic rhinitis due to pollen, Seasonal allergic conjunctivitis   Started by:  Darrin Hernandez MD        Dose:  2 spray   Spray 2 sprays into both nostrils daily   Quantity:  1 Box   Refills:  11       montelukast 10 MG tablet   Commonly known as:  SINGULAIR   Used for:  Seasonal allergic conjunctivitis, Chronic seasonal allergic rhinitis due to pollen   Started by:  Darrin Hernandez MD        Dose:  10 mg   Take 1 tablet (10 mg) by mouth At Bedtime   Quantity:  30 tablet   Refills:  1       traMADol-acetaminophen 37.5-325 MG per tablet   Commonly known as:  ULTRACET   Used for:  Right hip pain, Foot pain, left, Chronic pain of left knee   Started by:  Darrin Hernandez MD        Dose:  1 tablet   Take 1 tablet by mouth every 6 hours as needed for pain   Quantity:  60 tablet   Refills:  0            Where to get your medicines      These medications were sent to [facility] - [place], [place] - [address] [place] [address]  [address] [facility] [address] [place] [address]     Phone:  [phone]     cetirizine 10 MG tablet    mometasone 50 MCG/ACT spray    montelukast 10 MG tablet         Some of these will need a paper prescription and others can be bought over the counter.  Ask your nurse if you have questions.     Bring a paper prescription for each of these medications     traMADol-acetaminophen 37.5-325 MG per tablet               Information about OPIOIDS     PRESCRIPTION  OPIOIDS: WHAT YOU NEED TO KNOW   We gave you an opioid (narcotic) pain medicine. It is important to manage your pain, but opioids are not always the best choice. You should first try all the other options your care team gave you. Take this medicine for as short a time (and as few doses) as possible.    Some activities can increase your pain, such as bandage changes or therapy sessions. It may help to take your pain medicine 30 to 60 minutes before these activities. Reduce your stress by getting enough sleep, working on hobbies you enjoy and practicing relaxation or meditation. Talk to your care team about ways to manage your pain beyond prescription opioids.    These medicines have risks:    DO NOT drive when on new or higher doses of pain medicine. These medicines can affect your alertness and reaction times, and you could be arrested for driving under the influence (DUI). If you need to use opioids long-term, talk to your care team about driving.    DO NOT operate heavy machinery    DO NOT do any other dangerous activities while taking these medicines.    DO NOT drink any alcohol while taking these medicines.     If the opioid prescribed includes acetaminophen, DO NOT take with any other medicines that contain acetaminophen. Read all labels carefully. Look for the word  acetaminophen  or  Tylenol.  Ask your pharmacist if you have questions or are unsure.    You can get addicted to pain medicines, especially if you have a history of addiction (chemical, alcohol or substance dependence). Talk to your care team about ways to reduce this risk.    All opioids tend to cause constipation. Drink plenty of water and eat foods that have a lot of fiber, such as fruits, vegetables, prune juice, apple juice and high-fiber cereal. Take a laxative (Miralax, milk of magnesia, Colace, Senna) if you don t move your bowels at least every other day. Other side effects include upset stomach, sleepiness, dizziness, throwing up,  tolerance (needing more of the medicine to have the same effect), physical dependence and slowed breathing.    Store your pills in a secure place, locked if possible. We will not replace any lost or stolen medicine. If you don t finish your medicine, please throw away (dispose) as directed by your pharmacist. The [place] [employer] has more information about safe disposal: [email or web address]         Primary Care Provider Office Phone # Fax #    Darrin Hernandez [phone] [address]  [facility] [address]        Equal Access to Services     NAYELY WEBB : Hadii merry ku hadasho Soeverardoali, waaxda luqadaha, qaybta kaalmada adehaiyada, mundo solis . So [facility] [phone].    ATENCIÓN: Si habla español, tiene a pizano disposición servicios gratuitos de asistencia lingüística. Llame al [phone].    We comply with applicable federal civil rights laws and [place] laws. We do not discriminate on the basis of race, color, national origin, age, disability, sex, sexual orientation, or gender identity.            Thank you!     Thank you for choosing [facility]  for your care. Our goal is always to provide you with excellent care. Hearing back from our patients is one way we can continue to improve our services. Please take a few minutes to complete the written survey that you may receive in the mail after your visit with us. Thank you!             Your Updated Medication List - Protect others around you: Learn how to safely use, store and throw away your medicines at www.disposemymeds.org.          This list is accurate as of 9/17/18 10:46 AM.  Always use your most recent med list.                   Brand Name Dispense Instructions for use Diagnosis    acetaminophen 500 MG tablet    TYLENOL    100 tablet    Take 1-2 tablets (500-1,000 mg) by mouth every 6 hours as  needed    Foot pain, left       aspirin 81 MG chewable tablet     108 tablet    Take 1 tablet (81 mg) by mouth daily    Essential hypertension with goal blood pressure less than 130/80, Hyperlipidemia with target LDL less than 100       atorvastatin 10 MG tablet    LIPITOR    30 tablet    Take 1 tablet (10 mg) by mouth daily    Pure hypercholesterolemia       blood glucose monitoring lancets     100 each    1 each 2 times daily    Diabetic polyneuropathy associated with diabetes mellitus due to underlying condition (H)       blood glucose monitoring meter device kit     1 kit    Use to test blood sugars TWICE DAILY  times daily or as directed.    Type 2 diabetes mellitus without complication, without long-term current use of insulin (H)       blood glucose monitoring test strip    MIKHAIL CONTOUR NEXT    100 each    1 strip by In Vitro route daily    Diabetic polyneuropathy associated with diabetes mellitus due to underlying condition (H)       cetirizine 10 MG tablet    zyrTEC    30 tablet    Take 1 tablet (10 mg) by mouth every evening    Chronic seasonal allergic rhinitis due to pollen, Seasonal allergic conjunctivitis       cholecalciferol 1000 units Tabs    VITAMIN D-1000 MAX ST    90 tablet    Take 3 tablets by mouth daily    Screening for diabetic peripheral neuropathy, Cramp of both lower extremities       finasteride 5 MG tablet    PROSCAR    90 tablet    Take 1 tablet (5 mg) by mouth daily    Benign non-nodular prostatic hyperplasia without lower urinary tract symptoms       glimepiride 1 MG tablet    AMARYL    30 tablet    Take 1 tablet (1 mg) by mouth every morning (before breakfast)    Controlled type 2 diabetes mellitus without complication, without long-term current use of insulin (H)       GLUCERNA OS Liqd     60 each    Take 237 mLs by mouth 2 times daily    Poor appetite       lidocaine 5 % Patch    LIDODERM    30 patch    On am off hour of sleep    Hx of total knee arthroplasty, right        lisinopril 2.5 MG tablet    PRINIVIL/Zestril    30 tablet    Take 1 tablet (2.5 mg) by mouth daily    Type 2 diabetes mellitus with stage 1 chronic kidney disease, without long-term current use of insulin (H)       metFORMIN 500 MG 24 hr tablet    GLUCOPHAGE-XR    60 tablet    TAKE 1 TABLET BY MOUTH TWICE DAILY WITH MEALS    Controlled type 2 diabetes mellitus without complication, without long-term current use of insulin (H)       mineral oil-white petrolatum Crea cream     30 g    Apply topically 2 times daily    Dry skin       mometasone 50 MCG/ACT spray    NASONEX    1 Box    Spray 2 sprays into both nostrils daily    Chronic seasonal allergic rhinitis due to pollen, Seasonal allergic conjunctivitis       montelukast 10 MG tablet    SINGULAIR    30 tablet    Take 1 tablet (10 mg) by mouth At Bedtime    Seasonal allergic conjunctivitis, Chronic seasonal allergic rhinitis due to pollen       senna-docusate 8.6-50 MG per tablet    SENOKOT-S;PERICOLACE    60 tablet    Take 2 tablets by mouth 2 times daily    Drug-induced constipation       tamsulosin 0.4 MG capsule    FLOMAX    90 capsule    Take 1 capsule (0.4 mg) by mouth daily    Benign non-nodular prostatic hyperplasia without lower urinary tract symptoms       traMADol-acetaminophen 37.5-325 MG per tablet    ULTRACET    60 tablet    Take 1 tablet by mouth every 6 hours as needed for pain    Right hip pain, Foot pain, left, Chronic pain of left knee       Wrist Blood Pressure Monitor Misc     1 each    1 each 2 times daily as needed    Benign essential hypertension

## 2018-09-17 NOTE — PROGRESS NOTES
SUBJECTIVE:   Chivo Urais is a 72 year old male who presents to clinic today for the following health issues:      ALLERGIES      Duration: 2/weeks    Description:   Nasal congestion: YES  Sneezing: YES  Red, itchy eyes: YES    Accompanying signs and symptoms: hard time sleeping, itchy throat    History (similar episodes/allergy testing): seasonal allergies    Precipitating or alleviating factors: spring and fall    Therapies tried and outcome: None    MYALGIAS IMPROVED    SEASONAL ALLERGIC RHINITIS WORSENED NOT RESPONDING TO FLUTICASONE    SEASONAL ALLERGIC CONJUNCTIVITIS  ON PATADAY     LOWER BACK PAIN with SCIATICA     HISTORY OF GLAUCOMA CONTROLLED with DROPS    VERY MILD CHRONIC KIDNEY DISEASE     DIABETES 2 GOAL 8% WELL CONTROLLED FOOT EXAM WITHIN NORMAL LIMITS TODAY     RIGHT HIP BURSITIS                      Diabetes Follow-up    Patient is checking blood sugars:  OCCASIONAL   Diabetic concerns: None   Symptoms of hypoglycemia (low blood sugar): none   Paresthesias (numbness or burning in feet) or sores: No   Date of last diabetic eye exam:  YEARLY RECOMMENDED     Diabetes Management Resources    Hyperlipidemia Follow-Up    Rate your low fat/cholesterol diet?: good  Taking statin?  No  Other lipid medications/supplements?:  none    Hypertension Follow-up    Outpatient blood pressures  WITHIN NORMAL LIMITS   Low Salt Diet: no added salt DIABETES AND MEDITERRANEAN DIET     BP Readings from Last 2 Encounters:   09/17/18 106/64   08/20/18 112/64     Hemoglobin A1C (%)   Date Value   07/30/2018 6.6 (H)   09/29/2017 7.1 (H)     LDL Cholesterol Calculated (mg/dL)   Date Value   07/30/2018 80   09/29/2017 39     Amount of exercise or physical activity: 6-7 days/week for an average of 30-45 minutes  Problems taking medications regularly: No  Medication side effects: none  Diet: low salt, low fat/cholesterol and diabetic                 Topic Date Due     FIT Q1 YR  09/29/2018               .  Current  Outpatient Prescriptions   Medication Sig Dispense Refill     acetaminophen (TYLENOL) 500 MG tablet Take 1-2 tablets (500-1,000 mg) by mouth every 6 hours as needed 100 tablet 11     aspirin 81 MG chewable tablet Take 1 tablet (81 mg) by mouth daily 108 tablet 3     atorvastatin (LIPITOR) 10 MG tablet Take 1 tablet (10 mg) by mouth daily 30 tablet 11     blood glucose monitoring (MIKHAIL CONTOUR MONITOR) meter device kit Use to test blood sugars TWICE DAILY  times daily or as directed. 1 kit 0     blood glucose monitoring (MIKHAIL CONTOUR NEXT) test strip 1 strip by In Vitro route daily 100 each 11     blood glucose monitoring (MIKHAIL MICROLET) lancets 1 each 2 times daily 100 each 11     Blood Pressure Monitoring (WRIST BLOOD PRESSURE MONITOR) MISC 1 each 2 times daily as needed 1 each 0     cetirizine (ZYRTEC) 10 MG tablet Take 1 tablet (10 mg) by mouth every evening 30 tablet 3     cholecalciferol (VITAMIN D-1000 MAX ST) 1000 units TABS Take 3 tablets by mouth daily 90 tablet 11     finasteride (PROSCAR) 5 MG tablet Take 1 tablet (5 mg) by mouth daily 90 tablet 3     glimepiride (AMARYL) 1 MG tablet Take 1 tablet (1 mg) by mouth every morning (before breakfast) 30 tablet 11     lidocaine (LIDODERM) 5 % Patch On am off hour of sleep 30 patch 3     lisinopril (PRINIVIL/ZESTRIL) 2.5 MG tablet Take 1 tablet (2.5 mg) by mouth daily 30 tablet 11     metFORMIN (GLUCOPHAGE-XR) 500 MG 24 hr tablet TAKE 1 TABLET BY MOUTH TWICE DAILY WITH MEALS 60 tablet 11     mineral oil-white petrolatum (MINERIN/EUCERIN) CREA cream Apply topically 2 times daily 30 g 11     mometasone (NASONEX) 50 MCG/ACT spray Spray 2 sprays into both nostrils daily 1 Box 11     montelukast (SINGULAIR) 10 MG tablet Take 1 tablet (10 mg) by mouth At Bedtime 30 tablet 1     Nutritional Supplements (GLUCERNA OS) LIQD Take 237 mLs by mouth 2 times daily 60 each 1     senna-docusate (SENOKOT-S;PERICOLACE) 8.6-50 MG per tablet Take 2 tablets by mouth 2 times daily  60 tablet 1     tamsulosin (FLOMAX) 0.4 MG capsule Take 1 capsule (0.4 mg) by mouth daily 90 capsule 3     traMADol-acetaminophen (ULTRACET) 37.5-325 MG per tablet Take 1 tablet by mouth every 6 hours as needed for pain 60 tablet 0              No Known Allergies      Immunization History   Administered Date(s) Administered     Pneumococcal 23 valent 03/05/2013     Tetanus 01/01/2010               reports that he does not drink alcohol.          reports that he does not use illicit drugs.        family history includes Family History Negative in his father and mother.        indicated that his mother is alive. He indicated that his father is alive.          has no past surgical history on file.         reports that he does not engage in sexual activity.    .  Pediatric History   Patient Guardian Status     Not on file.     Other Topics Concern     Parent/Sibling W/ Cabg, Mi Or Angioplasty Before 65f 55m? No     Social History Narrative    Surgical History  Return To Top     Status Surgery Time Frame Comment Record Date     Inactive  NONE      11/14/2007          --------------------------------------------------------------------------------    Food Allergy  Return To Top     Allergen Reaction Comment Record Date     * No known food allergies      11/14/2007          --------------------------------------------------------------------------------    Drug Allergy  Return To Top     Allergen Reaction Comment Record Date     * No known drug allergies      5/15/2007          --------------------------------------------------------------------------------    Environment Allergy  Return To Top     Allergen Reaction Comment Record Date     * No known environmental allergies      11/14/2007          --------------------------------------------------------------------------------    Social History  Return To Top     Question Answer Comment Record Date     Marital status      11/14/2007      Advance Directive or Living  Will  No    5/18/2010      Emotional Abuse  No    7/1/2011      Exercise  Yes SOMETIMES  walks alot.  11/14/2007      Caffeine  Yes  Tea  1/11/2008      Physical Abuse  No    7/1/2011      Sealtbelts  Yes    11/14/2007      Sexual Abuse  No     7/1/2011      Breast/Testicle Self Check  Yes    11/14/2007      Number of children  7  4 GIRLS AND 3 BOYS  7/17/2007      Living arrangements  Apartment/Condo    11/14/2007      Number of children in household  0    11/14/2007      Number of adults in household  1    11/14/2007      Education level  High School Graduate    11/14/2007      Employment  Currently unemployed    11/14/2007      Tobacco history  Has never smoked or chewed tobacco    8/29/2008      Alcohol history  Never drinks alcohol    11/14/2007      Has the patient ever used illegal drugs?  Has never used illegal drugs    7/1/2011      Has the patient used marijuana?  No    7/1/2011      Has the patient used cocaine?  No    7/1/2011          --------------------------------------------------------------------------------    Medical History Return To Top     Status Diagnosis Time Frame Comment Record Date     Active (729.1) - C - Myalgia      8/11/2011      Active (726.79) - C - Sub-Achilles bursitis      8/11/2011      Active (700) - C - Corn/callus    o  7/1/2011      Active (272.4) - C - Hyperlipidemia      7/1/2011      Active (V81.2) - C - SCREEN-CARDIOVASC NEC      6/21/2011      Active (V82.9) - C - Screening, vitamin d deficiency      4/8/2011      Active (V77.91) - C - Screening, lipids      4/8/2011      Active (V76.51) - C - Screening, colon      4/8/2011      Active (780.79) - C - Fatigue      5/18/2010      Active V76.44 Screening, prostate      10/7/2009      Active (780.79) - C - Fatigue      10/7/2009      Active 477.9 Rhinitis, allergic, cause unspecified      8/29/2008      Active (782.0) - C - Numbness    RIGHT LEG  6/19/2008      Active 780.79 Fatigue      2/20/2008      Active (784.0) - C -  Headache, unspec.      2/1/2008      Active 724.3 Sciatica    chronic, with worsening weakness and sensory changes in S1 distribution  1/11/2008      Active 608.4 MALE GEN INFLAM DIS OT      11/20/2007      Active 608.9 MALE GENITAL DIS UNSPEC      11/14/2007      Active V78.3 Screening, HGB      11/14/2007      Active 355.9 MONONEURITIS UNSPEC      7/17/2007      Active 365.9 UNSPECIFIED GLAUCOMA      5/16/2007      Active (719.46) - C - Knee pain      5/16/2007      Active (729.5) - C - limb pain    both legs muscle aches  5/16/2007      Active (724.2) - C - Low back pain      5/16/2007      Inactive  (780.79) - C - Fatigue    IMPROVED   10/22/2009      Inactive  (780.79) - C - Fatigue    IMPROVED  6/19/2008      Inactive  782.0 Numbness      5/16/2007          ----------------------------------------------------        --------------------------------------------------------------------------------    Family History  Return To Top     Status Relationship Disease Comment Record Date     Alive Mother      11/14/2007      Alive Father      11/14/2007          --------------------------------------------------------------------------------                       reports that he has never smoked. He has never used smokeless tobacco.        Medical, social, surgical, and family histories reviewed.        Labs reviewed in EPIC  Patient Active Problem List   Diagnosis     Health Care Home     Myalgia and myositis     Allergic rhinitis     Sciatica     Glaucoma     Foot pain, left     Controlled type 2 diabetes mellitus without complication (H)     Hyperlipidemia with target LDL less than 100     Vitamin D insufficiency     DDD (degenerative disc disease), lumbar     Stage 1 chronic renal impairment associated with type 2 diabetes mellitus (H)     Comprehensive diabetic foot examination, type 2 DM, encounter for (H)     Low HDL (under 40)     Hypertension goal BP (blood pressure) < 130/80     CKD (chronic kidney disease)  stage 2, GFR 60-89 ml/min     Right hip pain     Type 2 diabetes, HbA1C goal < 8% (H)     ACP (advance care planning)     Enthesopathy of right hip region         History reviewed. No pertinent surgical history.    Social History   Substance Use Topics     Smoking status: Never Smoker     Smokeless tobacco: Never Used     Alcohol use No       Family History   Problem Relation Age of Onset     Family History Negative Mother      Family History Negative Father              Current Outpatient Prescriptions   Medication Sig Dispense Refill     acetaminophen (TYLENOL) 500 MG tablet Take 1-2 tablets (500-1,000 mg) by mouth every 6 hours as needed 100 tablet 11     aspirin 81 MG chewable tablet Take 1 tablet (81 mg) by mouth daily 108 tablet 3     atorvastatin (LIPITOR) 10 MG tablet Take 1 tablet (10 mg) by mouth daily 30 tablet 11     blood glucose monitoring (MIKHAIL CONTOUR MONITOR) meter device kit Use to test blood sugars TWICE DAILY  times daily or as directed. 1 kit 0     blood glucose monitoring (MIKHAIL CONTOUR NEXT) test strip 1 strip by In Vitro route daily 100 each 11     blood glucose monitoring (MIKHAIL MICROLET) lancets 1 each 2 times daily 100 each 11     Blood Pressure Monitoring (WRIST BLOOD PRESSURE MONITOR) MISC 1 each 2 times daily as needed 1 each 0     cetirizine (ZYRTEC) 10 MG tablet Take 1 tablet (10 mg) by mouth every evening 30 tablet 3     cholecalciferol (VITAMIN D-1000 MAX ST) 1000 units TABS Take 3 tablets by mouth daily 90 tablet 11     finasteride (PROSCAR) 5 MG tablet Take 1 tablet (5 mg) by mouth daily 90 tablet 3     glimepiride (AMARYL) 1 MG tablet Take 1 tablet (1 mg) by mouth every morning (before breakfast) 30 tablet 11     lidocaine (LIDODERM) 5 % Patch On am off hour of sleep 30 patch 3     lisinopril (PRINIVIL/ZESTRIL) 2.5 MG tablet Take 1 tablet (2.5 mg) by mouth daily 30 tablet 11     metFORMIN (GLUCOPHAGE-XR) 500 MG 24 hr tablet TAKE 1 TABLET BY MOUTH TWICE DAILY WITH MEALS 60 tablet  11     mineral oil-white petrolatum (MINERIN/EUCERIN) CREA cream Apply topically 2 times daily 30 g 11     mometasone (NASONEX) 50 MCG/ACT spray Spray 2 sprays into both nostrils daily 1 Box 11     montelukast (SINGULAIR) 10 MG tablet Take 1 tablet (10 mg) by mouth At Bedtime 30 tablet 1     Nutritional Supplements (GLUCERNA OS) LIQD Take 237 mLs by mouth 2 times daily 60 each 1     senna-docusate (SENOKOT-S;PERICOLACE) 8.6-50 MG per tablet Take 2 tablets by mouth 2 times daily 60 tablet 1     tamsulosin (FLOMAX) 0.4 MG capsule Take 1 capsule (0.4 mg) by mouth daily 90 capsule 3     traMADol-acetaminophen (ULTRACET) 37.5-325 MG per tablet Take 1 tablet by mouth every 6 hours as needed for pain 60 tablet 0           Recent Labs   Lab Test  07/30/18   0820  06/28/18   0823  09/29/17   0832  06/08/17   0808  12/08/16   0920  11/10/16   1028   03/04/14   1008   A1C  6.6*   --   7.1*  7.6*   --   6.9*   < >  6.1*   LDL  80   --   39   --    --   62   < >  111   HDL  40   --   40   --    --   42   < >  44   TRIG  189*   --   306*   --    --   216*   < >  137   ALT  22   --   24  23   --   32   < >  28   CR  0.96  1.03  0.90  0.88   --   1.08   < >  1.10   GFRESTIMATED  77  70  83  85   --   67   < >  66   GFRESTBLACK  >90  85  >90  >90   GFR Calc     --   81   < >  80   POTASSIUM  4.1  4.3  4.2  4.0   --   4.2   < >  4.3   TSH   --    --    --    --   0.99   --    --   1.02    < > = values in this interval not displayed.            BP Readings from Last 6 Encounters:   09/17/18 106/64   08/20/18 112/64   07/30/18 118/66   07/23/18 124/72   06/28/18 112/64   12/19/17 124/72           Wt Readings from Last 3 Encounters:   09/17/18 175 lb (79.4 kg)   08/20/18 175 lb (79.4 kg)   07/30/18 173 lb (78.5 kg)                 Positive symptoms or findings indicated by bold designation:         ROS: 10 point ROS neg other than the symptoms noted above in the HPI.except  has Health Care Home; Myalgia and myositis;  Allergic rhinitis; Sciatica; Glaucoma; Foot pain, left; Controlled type 2 diabetes mellitus without complication (H); Hyperlipidemia with target LDL less than 100; Vitamin D insufficiency; DDD (degenerative disc disease), lumbar; Stage 1 chronic renal impairment associated with type 2 diabetes mellitus (H); Comprehensive diabetic foot examination, type 2 DM, encounter for (H); Low HDL (under 40); Hypertension goal BP (blood pressure) < 130/80; CKD (chronic kidney disease) stage 2, GFR 60-89 ml/min; Right hip pain; Type 2 diabetes, HbA1C goal < 8% (H); ACP (advance care planning); and Enthesopathy of right hip region on his problem list.  Review Of Systems    Skin: negative    Eyes: negative AND SEASONAL ALLERGIC CONJUNCTIVITIS  AND GLAUCOMA HISTORY     Ears/Nose/Throat: negative SEASONAL ALLERGIC RHINITIS     Respiratory: No shortness of breath, dyspnea on exertion, cough, or hemoptysis    Cardiovascular: negative    Gastrointestinal: negative GASTROESOPHAGEAL REFLUX DISEASE WITHOUT ESOPHAGITIS     Genitourinary: negative VERY MILD CHRONIC KIDNEY DISEASE      Musculoskeletal: back pain and arthritis    Neurologic: negative    Psychiatric: negative    Hematologic/Lymphatic/Immunologic: negative    Endocrine: diabetes                PE:  /64 (Cuff Size: Adult Regular)  Pulse 69  Temp 97.4  F (36.3  C) (Tympanic)  Resp 16  Wt 175 lb (79.4 kg)  SpO2 100%  BMI 22.78 kg/m2 Body mass index is 22.78 kg/(m^2).        Constitutional: general appearance, well nourished, well developed, in no acute distress, well developed, appears stated age, normal body habitus,          Eyes:; The patient has normal eyelids sclerae and conjunctivae :          Ears/Nose/Throat: external ear, overall: normal appearance; external nose, overall: benign appearance, normal moujth gums and lips           Neck: thyroid, overall: normal size, normal consistency, nontender,          Respiratory:  palpation of chest, overall: normal  excursion,    Clear to percussion and auscultation     NO Tachypnea    NORMAL  Color           Cardiovascular:  Good color with no peripheral edema    Regular sinus rhythm without murmur.  Physiologic heart sounds   Heart is unelarged    .     Chest/Breast: normal shape           Abdominal exam,  Liver and spleen are  unenlarged        Tenderness     Scars              Urogenital; no renal, flank or bladder  tenderness;           Lymphatic: neck nodes,     Other nodes         Musculoskeletal:  Brief ortho exam normal except:   OSTEOARTHRITIS KNEES AND LOWER BACK PAIN     LEFT TOTAL KNEE ARTHROPLASTY STILL positive AINFUL AT NIGHT         Integument: inspection of skin, no rash, lesions; and, palpation, no induration, no tenderness.          Neurologic mental status, overall: alert and oriented; gait, no ataxia, no unsteadiness; coordination, no tremors; cranial nerves, overall: normal motor, overall: normal bulk, tone.          Psychiatric: orientation/consciousness, overall: oriented to person, place and time; behavior/psychomotor activity, no tics, normal psychomotor activity; mood and affect, overall: normal mood and affect; appearance, overall: well-groomed, good eye contact; speech, overall: normal quality, no aphasia and normal quality, quantity, intact.        Diagnostic Test Results:  Results for orders placed or performed in visit on 07/30/18   HEMOGLOBIN A1C   Result Value Ref Range    Hemoglobin A1C 6.6 (H) 0 - 5.6 %   Albumin Random Urine Quantitative with Creat Ratio   Result Value Ref Range    Creatinine Urine 228 mg/dL    Albumin Urine mg/L 11 mg/L    Albumin Urine mg/g Cr 4.82 0 - 17 mg/g Cr   Lipid panel reflex to direct LDL Fasting   Result Value Ref Range    Cholesterol 158 <200 mg/dL    Triglycerides 189 (H) <150 mg/dL    HDL Cholesterol 40 >39 mg/dL    LDL Cholesterol Calculated 80 <100 mg/dL    Non HDL Cholesterol 118 <130 mg/dL   ALT   Result Value Ref Range    ALT 22 0 - 70 U/L   Basic  metabolic panel   Result Value Ref Range    Sodium 139 133 - 144 mmol/L    Potassium 4.1 3.4 - 5.3 mmol/L    Chloride 102 94 - 109 mmol/L    Carbon Dioxide 27 20 - 32 mmol/L    Anion Gap 10 3 - 14 mmol/L    Glucose 132 (H) 70 - 99 mg/dL    Urea Nitrogen 14 7 - 30 mg/dL    Creatinine 0.96 0.66 - 1.25 mg/dL    GFR Estimate 77 >60 mL/min/1.7m2    GFR Estimate If Black >90 >60 mL/min/1.7m2    Calcium 9.2 8.5 - 10.1 mg/dL           ICD-10-CM    1. Chronic seasonal allergic rhinitis due to pollen J30.1 cetirizine (ZYRTEC) 10 MG tablet     montelukast (SINGULAIR) 10 MG tablet     mometasone (NASONEX) 50 MCG/ACT spray   2. Seasonal allergic conjunctivitis H10.45 cetirizine (ZYRTEC) 10 MG tablet     montelukast (SINGULAIR) 10 MG tablet     mometasone (NASONEX) 50 MCG/ACT spray   3. Hyperlipidemia with target LDL less than 100 E78.5    4. Hypertension goal BP (blood pressure) < 130/80 I10    5. Right hip pain M25.551 traMADol-acetaminophen (ULTRACET) 37.5-325 MG per tablet   6. Foot pain, left M79.672 traMADol-acetaminophen (ULTRACET) 37.5-325 MG per tablet   7. Vitamin D insufficiency E55.9    8. Type 2 diabetes mellitus with hyperosmolarity without coma, without long-term current use of insulin (H) E11.00 Albumin Random Urine Quantitative with Creat Ratio   9. CKD (chronic kidney disease) stage 2, GFR 60-89 ml/min N18.2    10. Chronic pain of left knee M25.562 traMADol-acetaminophen (ULTRACET) 37.5-325 MG per tablet    G89.29               .    Side effects benefits and risks thoroughly discussed. .he may come in early if unimproved or getting worse          Please drink 2 glasses of water prior to meals and walk 15-30 minutes after meals        I spent  25 MINUTES SPENT   with patient discussing the following issues   The primary encounter diagnosis was Chronic seasonal allergic rhinitis due to pollen. Diagnoses of Seasonal allergic conjunctivitis, Hyperlipidemia with target LDL less than 100, Hypertension goal BP (blood  pressure) < 130/80, Right hip pain, Foot pain, left, Vitamin D insufficiency, Type 2 diabetes mellitus with hyperosmolarity without coma, without long-term current use of insulin (H), CKD (chronic kidney disease) stage 2, GFR 60-89 ml/min, and Chronic pain of left knee were also pertinent to this visit. over half of which involved counseling and coordination of care.      Patient Instructions   (J30.1) Chronic seasonal allergic rhinitis due to pollen  (primary encounter diagnosis)  Comment:    Plan: cetirizine (ZYRTEC) 10 MG tablet, montelukast         (SINGULAIR) 10 MG tablet, mometasone (NASONEX)         50 MCG/ACT spray             (H10.45) Seasonal allergic conjunctivitis  Comment:    Plan: cetirizine (ZYRTEC) 10 MG tablet, montelukast         (SINGULAIR) 10 MG tablet, mometasone (NASONEX)         50 MCG/ACT spray             (E78.5) Hyperlipidemia with target LDL less than 100  Comment:    Plan:      (I10) Hypertension goal BP (blood pressure) < 130/80  Comment:    Plan:      (M25.551) Right hip pain  Comment:    Plan: traMADol-acetaminophen (ULTRACET) 37.5-325 MG         per tablet             (M79.672) Foot pain, left  Comment:    Plan: traMADol-acetaminophen (ULTRACET) 37.5-325 MG         per tablet             (E55.9) Vitamin D insufficiency  Comment:    Plan:      (E11.00) Type 2 diabetes mellitus with hyperosmolarity without coma, without long-term current use of insulin (H)  Comment:    Plan: Albumin Random Urine Quantitative with Creat         Ratio             (N18.2) CKD (chronic kidney disease) stage 2, GFR 60-89 ml/min  Comment:    Plan:      (M25.562,  G89.29) Chronic pain of left knee  Comment:    Plan: traMADol-acetaminophen (ULTRACET) 37.5-325 MG         per tablet                                 ALL THE ABOVE PROBLEMS ARE STABLE AND MED CHANGES AS NOTED        Diet:  MEDITERRANEAN DIET         Exercise:    Exercises Range of motion, balance, isometric, and strengthening exercises 30 repetitions  twice daily of involved joints            .EDUARDA LAMBERT MD 9/17/2018 1:07 PM  September 17, 2018

## 2018-09-18 ENCOUNTER — TRANSFERRED RECORDS (OUTPATIENT)
Dept: HEALTH INFORMATION MANAGEMENT | Facility: CLINIC | Age: 73
End: 2018-09-18

## 2018-09-21 DIAGNOSIS — R63.0 POOR APPETITE: ICD-10-CM

## 2018-09-21 RX ORDER — NUT.TX.GLUC INTOL,LF,SOY/FIBER 0.06 G-1.2
LIQUID (ML) ORAL
Qty: 60 EACH | Refills: 3 | Status: SHIPPED | OUTPATIENT
Start: 2018-09-21 | End: 2019-05-03

## 2018-09-21 NOTE — TELEPHONE ENCOUNTER
Nutritional Supplements (GLUCERNA OS) LIQD      Last Written Prescription Date:  8/20/2018  Last Fill Quantity: 60 each,   # refills: 1  Last Office Visit: 9/17/2018  Future Office visit:    Next 5 appointments (look out 90 days)     Dec 17, 2018  8:30 AM CST   Office Visit with Darrin Hernandez MD   [facility]    [address] [place] [id number] [phone]                   Routing refill request to provider for review/approval because:  Drug not on the [facility], [facility] or [facility] refill protocol or controlled substance

## 2018-10-19 DIAGNOSIS — H10.10 SEASONAL ALLERGIC CONJUNCTIVITIS: ICD-10-CM

## 2018-10-19 DIAGNOSIS — J30.1 CHRONIC SEASONAL ALLERGIC RHINITIS DUE TO POLLEN: ICD-10-CM

## 2018-10-19 NOTE — TELEPHONE ENCOUNTER
"Requested Prescriptions   Pending Prescriptions Disp Refills     montelukast (SINGULAIR) 10 MG tablet [Pharmacy Med Name: MONTELUKAST TAB 10MG 30]  Last Written Prescription Date:  09/17/2018  Last Fill Quantity: 30,  # refills: 1   Last office visit: 9/17/2018 with prescribing provider:   ROBERT LAMBERT   Future Office Visit:   Next 5 appointments (look out 90 days)     Nov 13, 2018  8:30 AM CST   Pre-Op physical with D[facility]in [facility]   [facility]    [address] [place] [id number]   [phone]            Dec 17, 2018  8:30 AM CST   Office Visit with Darrin Lambert MD   [facility]    [address] [place] [id number]   [phone]                  30 tablet 0     Sig: Take 1 tablet (10 mg) by mouth At Bedtime    Leukotriene Inhibitors Protocol Passed    10/19/2018  2:45 PM       Passed - Patient is age 12 or older    If patient is under 16, ok to refill using age based dosing.          Passed - Recent (12 mo) or future (30 days) visit within the authorizing provider's specialty    Patient had office visit in the last 12 months or has a visit in the next 30 days with authorizing provider or within the authorizing provider's specialty.  See \"Patient Info\" tab in inbasket, or \"Choose Columns\" in Meds & Orders section of the refill encounter.                "

## 2018-10-22 RX ORDER — MONTELUKAST SODIUM 10 MG/1
TABLET ORAL
Qty: 30 TABLET | Refills: 0 | Status: SHIPPED | OUTPATIENT
Start: 2018-10-22 | End: 2019-05-03

## 2018-10-30 ENCOUNTER — TRANSFERRED RECORDS (OUTPATIENT)
Dept: HEALTH INFORMATION MANAGEMENT | Facility: CLINIC | Age: 73
End: 2018-10-30

## 2018-11-13 ENCOUNTER — OFFICE VISIT (OUTPATIENT)
Dept: FAMILY MEDICINE | Facility: CLINIC | Age: 73
End: 2018-11-13
Payer: COMMERCIAL

## 2018-11-13 VITALS
TEMPERATURE: 96.9 F | BODY MASS INDEX: 23.43 KG/M2 | HEART RATE: 74 BPM | RESPIRATION RATE: 16 BRPM | SYSTOLIC BLOOD PRESSURE: 114 MMHG | DIASTOLIC BLOOD PRESSURE: 60 MMHG | OXYGEN SATURATION: 98 % | WEIGHT: 180 LBS

## 2018-11-13 DIAGNOSIS — E78.5 HYPERLIPIDEMIA WITH TARGET LDL LESS THAN 100: Chronic | ICD-10-CM

## 2018-11-13 DIAGNOSIS — E11.22 TYPE 2 DIABETES MELLITUS WITH STAGE 1 CHRONIC KIDNEY DISEASE, WITHOUT LONG-TERM CURRENT USE OF INSULIN (H): Chronic | ICD-10-CM

## 2018-11-13 DIAGNOSIS — E11.9 CONTROLLED TYPE 2 DIABETES MELLITUS WITHOUT COMPLICATION, WITHOUT LONG-TERM CURRENT USE OF INSULIN (H): Chronic | ICD-10-CM

## 2018-11-13 DIAGNOSIS — Z12.11 SPECIAL SCREENING FOR MALIGNANT NEOPLASMS, COLON: ICD-10-CM

## 2018-11-13 DIAGNOSIS — Z01.818 PREOP GENERAL PHYSICAL EXAM: Primary | ICD-10-CM

## 2018-11-13 DIAGNOSIS — E11.9 COMPREHENSIVE DIABETIC FOOT EXAMINATION, TYPE 2 DM, ENCOUNTER FOR (H): ICD-10-CM

## 2018-11-13 DIAGNOSIS — I10 HYPERTENSION GOAL BP (BLOOD PRESSURE) < 130/80: Chronic | ICD-10-CM

## 2018-11-13 DIAGNOSIS — N18.1 TYPE 2 DIABETES MELLITUS WITH STAGE 1 CHRONIC KIDNEY DISEASE, WITHOUT LONG-TERM CURRENT USE OF INSULIN (H): Chronic | ICD-10-CM

## 2018-11-13 DIAGNOSIS — Z13.89 SCREENING FOR DIABETIC PERIPHERAL NEUROPATHY: ICD-10-CM

## 2018-11-13 DIAGNOSIS — H25.011 CORTICAL AGE-RELATED CATARACT OF RIGHT EYE: ICD-10-CM

## 2018-11-13 DIAGNOSIS — E11.00 TYPE 2 DIABETES MELLITUS WITH HYPEROSMOLARITY WITHOUT COMA, WITHOUT LONG-TERM CURRENT USE OF INSULIN (H): ICD-10-CM

## 2018-11-13 LAB
HBA1C MFR BLD: 6.4 % (ref 0–5.6)
HEMOCCULT STL QL IA: NEGATIVE

## 2018-11-13 PROCEDURE — 36415 COLL VENOUS BLD VENIPUNCTURE: CPT | Performed by: FAMILY MEDICINE

## 2018-11-13 PROCEDURE — 83036 HEMOGLOBIN GLYCOSYLATED A1C: CPT | Performed by: FAMILY MEDICINE

## 2018-11-13 PROCEDURE — 82274 ASSAY TEST FOR BLOOD FECAL: CPT | Performed by: FAMILY MEDICINE

## 2018-11-13 PROCEDURE — 99207 C FOOT EXAM  NO CHARGE: CPT | Mod: 25 | Performed by: FAMILY MEDICINE

## 2018-11-13 PROCEDURE — 84443 ASSAY THYROID STIM HORMONE: CPT | Performed by: FAMILY MEDICINE

## 2018-11-13 PROCEDURE — 99214 OFFICE O/P EST MOD 30 MIN: CPT | Performed by: FAMILY MEDICINE

## 2018-11-13 NOTE — MR AVS SNAPSHOT
After Visit Summary   11/13/2018    Chivo Urias    MRN: [id number]           Patient Information     Date Of Birth          1945        Visit Information        Provider Department      11/13/2018 8:30 AM Darrin Hernandez MD; [employer] [facility]        Today's Diagnoses     Preop general physical exam    -  1    Cortical age-related cataract of right eye        Screening for diabetic peripheral neuropathy        Controlled type 2 diabetes mellitus without complication, without long-term current use of insulin (H)        Hyperlipidemia with target LDL less than 100        Type 2 diabetes mellitus with stage 1 chronic kidney disease, without long-term current use of insulin (H)        Hypertension goal BP (blood pressure) < 130/80        Type 2 diabetes mellitus with hyperosmolarity without coma, without long-term current use of insulin (H)        Special screening for malignant neoplasms, colon        Comprehensive diabetic foot examination, type 2 DM, encounter for (H)          Care Instructions      Before Your Surgery      Call your surgeon if there is any change in your health. This includes signs of a cold or flu (such as a sore throat, runny nose, cough, rash or fever).    Do not smoke, drink alcohol or take over the counter medicine (unless your surgeon or primary care doctor tells you to) for the 24 hours before and after surgery.    If you take prescribed drugs: Follow your doctor s orders about which medicines to take and which to stop until after surgery.    Eating and drinking prior to surgery: follow the instructions from your surgeon    Take a shower or bath the night before surgery. Use the soap your surgeon gave you to gently clean your skin. If you do not have soap from your surgeon, use your regular soap. Do not shave or scrub the surgery site.  Wear clean pajamas and have clean sheets on your bed.            Follow-ups after your visit        Follow-up notes from your care team     Return in about 3 months (around 2/13/2019) for Diabetes visit.      Your next 10 appointments already scheduled     Dec 17, 2018  8:30 AM CST   [facility]    [address]  [place] [address] [phone]           Bring a current list of meds and any records pertaining to this visit. For Physicals, please bring immunization records and any forms needing to be filled out. Please arrive 10 minutes early to complete paperwork.              Future tests that were ordered for you today     Open Future Orders        Priority Expected Expires Ordered    Fecal colorectal cancer screen FIT - Future (S+30) Routine 12/4/2018 12/13/2018 11/13/2018            Who to contact     If you have questions or need follow up information about today's clinic visit or your schedule please contact [facility] directly at [phone].  Normal or non-critical lab and imaging results will be communicated to you by MyChart, letter or phone within 4 business days after the clinic has received the results. If you do not hear from us within 7 days, please contact the clinic through MyChart or phone. If you have a critical or abnormal lab result, we will notify you by phone as soon as possible.  Submit refill requests through [employer] or call your pharmacy and they will forward the refill request to us. Please allow 3 business days for your refill to be completed.          Additional Information About Your Visit        Care EveryWhere ID     This is your Care EveryWhere ID. This could be used by other organizations to access your [place] medical records  [id number]        Your Vitals Were     Pulse Temperature Respirations Pulse Oximetry BMI (Body Mass Index)       74 96.9  F (36.1   C) (Tympanic) 16 98% 23.43 kg/m2        Blood Pressure from Last 3 Encounters:   11/13/18 114/60   09/17/18 106/64   08/20/18 112/64    Weight from Last 3 Encounters:   11/13/18 180 lb (81.6 kg)   09/17/18 175 lb (79.4 kg)   08/20/18 175 lb (79.4 kg)              We Performed the Following     FOOT EXAM  NO CHARGE [10591.114]     Hemoglobin A1c     TSH WITH FREE T4 REFLEX        Primary Care Provider Office Phone # Fax #    Darrin Marilou Hernandez [phone]       [address] [address]        Equal Access to Services     NAYELY WEBB : Hadii merry alcalao Soreal, waaxda luqadaha, qaybta kaalmada adeegyada, mundo acevedo. So [facility] [phone].    ATENCIÓN: Si habla español, tiene a pizano disposición servicios gratuitos de asistencia lingüística. Llame al [phone].    We comply with applicable federal civil rights laws and [place] laws. We do not discriminate on the basis of race, color, national origin, age, disability, sex, sexual orientation, or gender identity.            Thank you!     Thank you for choosing [facility]  for your care. Our goal is always to provide you with excellent care. Hearing back from our patients is one way we can continue to improve our services. Please take a few minutes to complete the written survey that you may receive in the mail after your visit with us. Thank you!             Your Updated Medication List - Protect others around you: Learn how to safely use, store and throw away your medicines at www.disposemymeds.org.          This list is accurate as of 11/13/18  9:25 AM.  Always use your most recent med list.                   Brand Name Dispense Instructions for use Diagnosis    acetaminophen 500 MG tablet    TYLENOL    100 tablet    Take 1-2 tablets (500-1,000 mg) by mouth every 6 hours as needed    Foot pain, left       aspirin 81 MG chewable tablet     108 tablet    Take 1  tablet (81 mg) by mouth daily    Essential hypertension with goal blood pressure less than 130/80, Hyperlipidemia with target LDL less than 100       atorvastatin 10 MG tablet    LIPITOR    30 tablet    Take 1 tablet (10 mg) by mouth daily    Pure hypercholesterolemia       blood glucose monitoring lancets     100 each    1 each 2 times daily    Diabetic polyneuropathy associated with diabetes mellitus due to underlying condition (H)       blood glucose monitoring meter device kit     1 kit    Use to test blood sugars TWICE DAILY  times daily or as directed.    Type 2 diabetes mellitus without complication, without long-term current use of insulin (H)       blood glucose monitoring test strip    MIKHAIL CONTOUR NEXT    100 each    1 strip by In Vitro route daily    Diabetic polyneuropathy associated with diabetes mellitus due to underlying condition (H)       cetirizine 10 MG tablet    zyrTEC    30 tablet    Take 1 tablet (10 mg) by mouth every evening    Chronic seasonal allergic rhinitis due to pollen, Seasonal allergic conjunctivitis       cholecalciferol 1000 units Tabs    VITAMIN D-1000 MAX ST    90 tablet    Take 3 tablets by mouth daily    Screening for diabetic peripheral neuropathy, Cramp of both lower extremities       finasteride 5 MG tablet    PROSCAR    90 tablet    Take 1 tablet (5 mg) by mouth daily    Benign non-nodular prostatic hyperplasia without lower urinary tract symptoms       glimepiride 1 MG tablet    AMARYL    30 tablet    Take 1 tablet (1 mg) by mouth every morning (before breakfast)    Controlled type 2 diabetes mellitus without complication, without long-term current use of insulin (H)       GLUCERNA 1.2 MARTINEZ Liqd     60 each    DRINK 1 CAN by mouth 2 times daily    Poor appetite       lidocaine 5 % Patch    LIDODERM    30 patch    On am off hour of sleep    Hx of total knee arthroplasty, right       lisinopril 2.5 MG tablet    PRINIVIL/Zestril    30 tablet    Take 1 tablet (2.5 mg) by  mouth daily    Type 2 diabetes mellitus with stage 1 chronic kidney disease, without long-term current use of insulin (H)       metFORMIN 500 MG 24 hr tablet    GLUCOPHAGE-XR    60 tablet    TAKE 1 TABLET BY MOUTH TWICE DAILY WITH MEALS    Controlled type 2 diabetes mellitus without complication, without long-term current use of insulin (H)       mineral oil-white petrolatum Crea cream     30 g    Apply topically 2 times daily    Dry skin       mometasone 50 MCG/ACT spray    NASONEX    1 Box    Spray 2 sprays into both nostrils daily    Chronic seasonal allergic rhinitis due to pollen, Seasonal allergic conjunctivitis       montelukast 10 MG tablet    SINGULAIR    30 tablet    Take 1 tablet (10 mg) by mouth At Bedtime    Seasonal allergic conjunctivitis, Chronic seasonal allergic rhinitis due to pollen       senna-docusate 8.6-50 MG per tablet    SENOKOT-S;PERICOLACE    60 tablet    Take 2 tablets by mouth 2 times daily    Drug-induced constipation       tamsulosin 0.4 MG capsule    FLOMAX    90 capsule    Take 1 capsule (0.4 mg) by mouth daily    Benign non-nodular prostatic hyperplasia without lower urinary tract symptoms       traMADol-acetaminophen 37.5-325 MG per tablet    ULTRACET    60 tablet    Take 1 tablet by mouth every 6 hours as needed for pain    Right hip pain, Foot pain, left, Chronic pain of left knee       Wrist Blood Pressure Monitor Misc     1 each    1 each 2 times daily as needed    Benign essential hypertension

## 2018-11-13 NOTE — LETTER
November 15, 2018      Chivo Urias  [address]  [address]  [place] [address]        Dear ,    We are writing to inform you of your test results.    NORMAL THYROID STIMULATING HORMONE TEST   THREE MONTH GLUCOSE AVERAGE  AT TARGET LEVEL       Resulted Orders   TSH WITH FREE T4 REFLEX   Result Value Ref Range    TSH 1.21 0.40 - 4.00 mU/L   Hemoglobin A1c   Result Value Ref Range    Hemoglobin A1C 6.4 (H) 0 - 5.6 %      Comment:      Normal <5.7% Prediabetes 5.7-6.4%  Diabetes 6.5% or higher - adopted from ADA   consensus guidelines.         If you have any questions or concerns, please call the clinic at the number listed above.       Sincerely,        EDUARDA LAMBERT MD

## 2018-11-13 NOTE — PROGRESS NOTES
[address]  [address]  [facility] [address]  [phone]  Dept: [phone]    PRE-OP EVALUATION:  Today's date: 2018    Chivo Urias (: 1945) presents for pre-operative evaluation assessment as requested by Dr. Deloris Gilman.  He requires evaluation and anesthesia risk assessment prior to undergoing surgery/procedure for treatment of cataracts .    Proposed Surgery/ Procedure: right eye  Date of Surgery/ Procedure: 2018  Time of Surgery/ Procedure: [facility]/Surgical Facility: [facility]  Fax number for surgical facility: [phone]  Primary Physician: Darrin Hernandez  Type of Anesthesia Anticipated: to be determined    Patient has a Health Care Directive or Living Will:  NO    1. NO - Do you have a history of heart attack, stroke, stent, bypass or surgery on an artery in the head, neck, heart or legs?  2. NO - Do you ever have any pain or discomfort in your chest?  3. NO - Do you have a history of  Heart Failure?  4. NO - Are you troubled by shortness of breath when: walking on the level, up a slight hill or at night?  5. NO - Do you currently have a cold, bronchitis or other respiratory infection?  6. NO - Do you have a cough, shortness of breath or wheezing?  7. NO - Do you sometimes get pains in the calves of your legs when you walk?  8. NO - Do you or anyone in your family have previous history of blood clots?  9. NO - Do you or does anyone in your family have a serious bleeding problem such as prolonged bleeding following surgeries or cuts?  10. NO - Have you ever had problems with anemia or been told to take iron pills?  11. NO - Have you had any abnormal blood loss such as black, tarry or bloody stools, or abnormal vaginal bleeding?  12. NO - Have you ever had a blood transfusion?  13. NO - Have you or any of your relatives ever had problems with anesthesia?  14. NO - Do you  have sleep apnea, excessive snoring or daytime drowsiness?  15. NO - Do you have any prosthetic heart valves?  16. NO - Do you have prosthetic joints?  17. NO - Is there any chance that you may be pregnant?      HPI:     HPI related to upcoming procedure:  CATARACT RIGHT EYE FOR 3-4 MONTHS       DIABETES - Patient has a longstanding history of DiabetesType Type II . Patient is being treated with diet and oral agents and denies significant side effects. Control has been good. Complicating factors include but are not limited to: hypertension.                                                                                                                            .  HYPERLIPIDEMIA - Patient has a long history of significant Hyperlipidemia requiring medication for treatment with recent good control. Patient reports no problems or side effects with the medication.                                                                                                                                                       .  HYPERTENSION - Patient has longstanding history of HTN , currently denies any symptoms referable to elevated blood pressure. Specifically denies chest pain, palpitations, dyspnea, orthopnea, PND or peripheral edema. Blood pressure readings have been in normal range. Current medication regimen is as listed below. Patient denies any side effects of medication.                                                                                                                                                                                          .    MEDICAL HISTORY:     Patient Active Problem List    Diagnosis Date Noted     Hypertension goal BP (blood pressure) < 130/80 06/19/2013     Priority: High     Stage 1 chronic renal impairment associated with type 2 diabetes mellitus (H) 06/10/2013     Priority: High     Controlled type 2 diabetes mellitus without complication (H) 05/23/2013     Priority: High      Hyperlipidemia with target LDL less than 100 2013     Priority: High     Diagnosis updated by automated process. Provider to review and confirm.       Enthesopathy of right hip region 09/15/2016     Priority: Medium     ACP (advance care planning) 2016     Priority: Medium     Advance Care Planning 2016: ACP Review of Chart / Resources Provided:  Reviewed chart for advance care plan.  Chivo Urias has no plan or code status on file. Discussed available resources and provided with information. Pt declined form at this time.  Added by Stella Khan           Type 2 diabetes, HbA1C goal < 8% (H) 2015     Priority: Medium     Right hip pain 10/15/2015     Priority: Medium     Low HDL (under 40) 2013     Priority: Medium     CKD (chronic kidney disease) stage 2, GFR 60-89 ml/min 2013     Priority: Medium     Comprehensive diabetic foot examination, type 2 DM, encounter for (H) 06/10/2013     Priority: Medium     Vitamin D insufficiency 2013     Priority: Medium     DDD (degenerative disc disease), lumbar 2013     Priority: Medium     radic right leg       Foot pain, left 2013     Priority: Medium     Myalgia and myositis 2013     Priority: Medium     Problem list name updated by automated process. Provider to review       Allergic rhinitis 2013     Priority: Medium     Problem list name updated by automated process. Provider to review       Sciatica 2013     Priority: Medium     Glaucoma 2013     Priority: Medium     Problem list name updated by automated process. Provider to review       Health Care Home 2012     Priority: Medium     [facility] Case Management  Laura Romo RN  [phone]   [place] [facility] CARE PLAN SUMMARY    Client Name:  Chivo Urias  Address:  [facility]  [facility] [address] Phone: [phone] (home)    :  1945 Date of Assessment: 18   Health  Plan: [facility]   Health Plan Number: [id number] Medical Assistance Number: [id number]  Financial Worker:  Skyler   Case #:  [id number]   [facility] :  Laura Romo RN CM Phone:  [phone]  CM Fax:  [phone]   [facility] Enrollment Date: 4/1/13 Case Mix:  L  Rate Cell: B  Waiver Type: EW   Emergency Contact:Chivo Oliveira  Home Phone: [phone]  Relation: Relative Language:  Spanish  :  JCAOB speaks Spanish   Health Care Agent/POA:  None Advanced Directives/Living Will:  None   Primary Care Clinic/Phone/Fax:  [facility]/p) [phone], f) [phone] Primary Dx:  Type 2 DM(E11.9)   Secondary Dx:  Degenerative disc disease (M51.36), Sciatica (M54.30)   Primary Physician:  Darrin Hernandez   Height:  6'1.5  Weight:  189 lbs   Specialty Physician:  Donny Saleh   Audiologist:  N/A   Eye Care Provider:  Dr. Deloris Manriquezom: [facility] [phone] Dental Care Provider:    [facility]: [facility] [phone] or [phone]   Other:        [facility] CURRENT SERVICES SUMMARY  Equipment owned/DME history:   SERVICE TYPE/PROVIDER NAME/PHONE AUTH DATE FREQUENCY Units OR $ Amt DESCRIPTION   Medical Transportation: [facility] [phone]  Fax:  04/01/18-  03/31/19 as needed N/A    Homemaking: [facility] [phone]    Fax:  04/01/18-  02/28/19 weekly 20 units/week Homemaking                                   Past Medical History:   Diagnosis Date     Arthritis      Controlled type 2 diabetes mellitus without complication (H) 5/23/2013     Diabetes mellitus (H)      Hyperlipidemia 1/7/2013    Problem list name updated by automated process. Provider to review     Hypertension      History reviewed. No pertinent surgical history.  Current Outpatient Prescriptions   Medication Sig Dispense Refill     acetaminophen (TYLENOL) 500 MG tablet Take 1-2 tablets (500-1,000 mg) by mouth every 6 hours as needed 100 tablet 11      aspirin 81 MG chewable tablet Take 1 tablet (81 mg) by mouth daily 108 tablet 3     atorvastatin (LIPITOR) 10 MG tablet Take 1 tablet (10 mg) by mouth daily 30 tablet 11     blood glucose monitoring (MIKHAIL CONTOUR MONITOR) meter device kit Use to test blood sugars TWICE DAILY  times daily or as directed. 1 kit 0     blood glucose monitoring (MIKHAIL CONTOUR NEXT) test strip 1 strip by In Vitro route daily 100 each 11     blood glucose monitoring (MIKHAIL MICROLET) lancets 1 each 2 times daily 100 each 11     Blood Pressure Monitoring (WRIST BLOOD PRESSURE MONITOR) MISC 1 each 2 times daily as needed 1 each 0     cetirizine (ZYRTEC) 10 MG tablet Take 1 tablet (10 mg) by mouth every evening 30 tablet 3     cholecalciferol (VITAMIN D-1000 MAX ST) 1000 units TABS Take 3 tablets by mouth daily 90 tablet 11     finasteride (PROSCAR) 5 MG tablet Take 1 tablet (5 mg) by mouth daily 90 tablet 3     glimepiride (AMARYL) 1 MG tablet Take 1 tablet (1 mg) by mouth every morning (before breakfast) 30 tablet 11     lidocaine (LIDODERM) 5 % Patch On am off hour of sleep 30 patch 3     lisinopril (PRINIVIL/ZESTRIL) 2.5 MG tablet Take 1 tablet (2.5 mg) by mouth daily 30 tablet 11     metFORMIN (GLUCOPHAGE-XR) 500 MG 24 hr tablet TAKE 1 TABLET BY MOUTH TWICE DAILY WITH MEALS 60 tablet 11     mineral oil-white petrolatum (MINERIN/EUCERIN) CREA cream Apply topically 2 times daily 30 g 11     mometasone (NASONEX) 50 MCG/ACT spray Spray 2 sprays into both nostrils daily 1 Box 11     montelukast (SINGULAIR) 10 MG tablet Take 1 tablet (10 mg) by mouth At Bedtime 30 tablet 0     Nutritional Supplements (GLUCERNA 1.2 MARTINEZ) LIQD DRINK 1 CAN by mouth 2 times daily 60 each 3     senna-docusate (SENOKOT-S;PERICOLACE) 8.6-50 MG per tablet Take 2 tablets by mouth 2 times daily 60 tablet 1     tamsulosin (FLOMAX) 0.4 MG capsule Take 1 capsule (0.4 mg) by mouth daily 90 capsule 3     traMADol-acetaminophen (ULTRACET) 37.5-325 MG per tablet Take 1 tablet  by mouth every 6 hours as needed for pain 60 tablet 0     OTC products: None, except as noted above    No Known Allergies   Latex Allergy: NO    Social History   Substance Use Topics     Smoking status: Never Smoker     Smokeless tobacco: Never Used     Alcohol use No     History   Drug Use No       REVIEW OF SYSTEMS:   Constitutional, neuro, ENT, endocrine, pulmonary, cardiac, gastrointestinal, genitourinary, musculoskeletal, integument and psychiatric systems are negative, except as otherwise noted.    EXAM:   /60  Pulse 74  Temp 96.9  F (36.1  C) (Tympanic)  Resp 16  Wt 180 lb (81.6 kg)  SpO2 98%  BMI 23.43 kg/m2    GENERAL APPEARANCE: healthy, alert and no distress     EYES: EOMI,  PERRL     HENT: ear canals and TM's normal and nose and mouth without ulcers or lesions     NECK: no adenopathy, no asymmetry, masses, or scars and thyroid normal to palpation     RESP: lungs clear to auscultation - no rales, rhonchi or wheezes     CV: regular rates and rhythm, normal S1 S2, no S3 or S4 and no murmur, click or rub     ABDOMEN:  soft, nontender, no HSM or masses and bowel sounds normal     MS: extremities normal- no gross deformities noted, no evidence of inflammation in joints, FROM in all extremities.     SKIN: no suspicious lesions or rashes     NEURO: Normal strength and tone, sensory exam grossly normal, mentation intact and speech normal     PSYCH: mentation appears normal. and affect normal/bright     LYMPHATICS: No cervical adenopathy      NORMAL CIRCULATION MOTION AND SENSATION NORMAL MONOFILAMENT TESTING BILATERAL     DIAGNOSTICS:     No labs or EKG required for low risk surgery (cataract, skin procedure, breast biopsy, etc)  Labs Resulted Today:   Results for orders placed or performed in visit on 09/18/18   Eye Exam - HIM Scan    [facility]    [facility]-Progress note       Recent Labs   Lab Test  07/30/18   0820  06/28/18   0823  09/29/17   0832   HGB   --   16.9  15.0   PLT   --   194   190   NA  139  139  140   POTASSIUM  4.1  4.3  4.2   CR  0.96  1.03  0.90   A1C  6.6*   --   7.1*        IMPRESSION:   Reason for surgery/procedure: CATARACT RIGHT EYE   Diagnosis/reason for consult:  CATARACT RIGHT EYE     The proposed surgical procedure is considered LOW risk.    REVISED CARDIAC RISK INDEX  The patient has the following serious cardiovascular risks for perioperative complications such as (MI, PE, VFib and 3  AV Block):  No serious cardiac risks  INTERPRETATION: 0 risks: Class I (very low risk - 0.4% complication rate)    The patient has the following additional risks for perioperative complications:  No identified additional risks  The 10-year ASCVD risk score (Celestinoannette RIVAS Jr, et al., 2013) is: 30.8%    Values used to calculate the score:      Age: 72 years      Sex: Male      Is Non- : Yes      Diabetic: Yes      Tobacco smoker: No      Systolic Blood Pressure: 114 mmHg      Is BP treated: Yes      HDL Cholesterol: 40 mg/dL      Total Cholesterol: 158 mg/dL      ICD-10-CM    1. Preop general physical exam Z01.818    2. Cortical age-related cataract of right eye H25.011    3. Screening for diabetic peripheral neuropathy Z13.89 FOOT EXAM  NO CHARGE [20504.114]   4. Controlled type 2 diabetes mellitus without complication, without long-term current use of insulin (H) E11.9 TSH WITH FREE T4 REFLEX     Hemoglobin A1c   5. Hyperlipidemia with target LDL less than 100 E78.5    6. Type 2 diabetes mellitus with stage 1 chronic kidney disease, without long-term current use of insulin (H) E11.22     N18.1    7. Hypertension goal BP (blood pressure) < 130/80 I10    8. Type 2 diabetes mellitus with hyperosmolarity without coma, without long-term current use of insulin (H) E11.00    9. Special screening for malignant neoplasms, colon Z12.11 Fecal colorectal cancer screen FIT - Future (S+30)   10. Comprehensive diabetic foot examination, type 2 DM, encounter for (H) E11.9         RECOMMENDATIONS:     --Consult hospital rounder / IM to assist post-op medical management    --Patient is to take all scheduled medications on the day of surgery EXCEPT for modifications listed below.    Diabetes Medication Use    -----Hold usual oral and non-insulin diabetic meds (e.g. Metformin, Actos, Glipizide) while NPO.       APPROVAL GIVEN to proceed with proposed procedure, without further diagnostic evaluation       Signed Electronically by: EDUARDA LAMBERT MD    Copy of this evaluation report is provided to requesting physician.    [facility] Preop Guidelines    Revised Cardiac Risk Index

## 2018-11-14 ENCOUNTER — TELEPHONE (OUTPATIENT)
Dept: FAMILY MEDICINE | Facility: CLINIC | Age: 73
End: 2018-11-14

## 2018-11-14 LAB — TSH SERPL DL<=0.005 MIU/L-ACNC: 1.21 MU/L (ref 0.4–4)

## 2018-11-14 NOTE — TELEPHONE ENCOUNTER
Reason for Call:  Other needs preop records for [facility]    Detailed comments: pt had preop for surgery and they are requesting records be faxed to  [phone].    Phone Number Patient can be reached at: Home number on file [phone] (home)    Best Time: any    Can we leave a detailed message on this number? YES    Call taken on 11/14/2018 at 12:03 PM by INOCENTE FIGUEREDO

## 2019-01-18 ENCOUNTER — PATIENT OUTREACH (OUTPATIENT)
Dept: GERIATRIC MEDICINE | Facility: CLINIC | Age: 74
End: 2019-01-18

## 2019-01-18 NOTE — PROGRESS NOTES
[facility] Care Coordination Contact    CC was notified that client's health plan will end on 1/31/19. Called client but was unable o reach due to non-working number. Called the homemaking provider and was informed that client had left for [place] on 12/5/18. Called daughter-Adriano and confirmed client's ALYSHA status. Daughter stated she doesn't know date of return but thinks it will be sometime in March.     Laura Romo RN BSN PHN  [facility] Care Coordinator  [phone]  Fax:[phone]

## 2019-01-24 NOTE — PROGRESS NOTES
[facility] Care Coordination Contact    Per CC, continues to be unable to contact member for assessment, MMIS was not entered due to client MA being inactive.  Per  direction EW Closed 12/31/18.    Dori Red  Case Management Specialist   [facility]   [phone]

## 2019-04-22 ENCOUNTER — PATIENT OUTREACH (OUTPATIENT)
Dept: GERIATRIC MEDICINE | Facility: CLINIC | Age: 74
End: 2019-04-22

## 2019-04-22 NOTE — PROGRESS NOTES
[facility] Care Coordination Contact    No longer active with [facility] case management effective 3/31/19.  Reason for community disenrollment: MA Term 12/31/18. [employer] term 3/31/19.     Laura Romo RN BSN PHN  [facility] Care Coordinator  [phone]  Fax:[phone]

## 2019-05-03 ENCOUNTER — OFFICE VISIT (OUTPATIENT)
Dept: FAMILY MEDICINE | Facility: CLINIC | Age: 74
End: 2019-05-03
Payer: COMMERCIAL

## 2019-05-03 VITALS
RESPIRATION RATE: 16 BRPM | TEMPERATURE: 97.9 F | BODY MASS INDEX: 24.08 KG/M2 | SYSTOLIC BLOOD PRESSURE: 132 MMHG | HEART RATE: 59 BPM | WEIGHT: 185 LBS | OXYGEN SATURATION: 99 % | DIASTOLIC BLOOD PRESSURE: 74 MMHG

## 2019-05-03 DIAGNOSIS — H10.10 SEASONAL ALLERGIC CONJUNCTIVITIS: ICD-10-CM

## 2019-05-03 DIAGNOSIS — E11.22 TYPE 2 DIABETES MELLITUS WITH CHRONIC KIDNEY DISEASE, WITH LONG-TERM CURRENT USE OF INSULIN, UNSPECIFIED CKD STAGE (H): Primary | ICD-10-CM

## 2019-05-03 DIAGNOSIS — L85.3 DRY SKIN: ICD-10-CM

## 2019-05-03 DIAGNOSIS — Z12.5 SCREENING PSA (PROSTATE SPECIFIC ANTIGEN): ICD-10-CM

## 2019-05-03 DIAGNOSIS — I10 BENIGN ESSENTIAL HYPERTENSION: ICD-10-CM

## 2019-05-03 DIAGNOSIS — R63.0 POOR APPETITE: ICD-10-CM

## 2019-05-03 DIAGNOSIS — Z13.89 SCREENING FOR DIABETIC PERIPHERAL NEUROPATHY: ICD-10-CM

## 2019-05-03 DIAGNOSIS — N18.1 TYPE 2 DIABETES MELLITUS WITH STAGE 1 CHRONIC KIDNEY DISEASE, WITHOUT LONG-TERM CURRENT USE OF INSULIN (H): Chronic | ICD-10-CM

## 2019-05-03 DIAGNOSIS — E08.42 DIABETIC POLYNEUROPATHY ASSOCIATED WITH DIABETES MELLITUS DUE TO UNDERLYING CONDITION (H): ICD-10-CM

## 2019-05-03 DIAGNOSIS — E11.22 TYPE 2 DIABETES MELLITUS WITH STAGE 1 CHRONIC KIDNEY DISEASE, WITHOUT LONG-TERM CURRENT USE OF INSULIN (H): Chronic | ICD-10-CM

## 2019-05-03 DIAGNOSIS — E78.00 PURE HYPERCHOLESTEROLEMIA: ICD-10-CM

## 2019-05-03 DIAGNOSIS — N40.0 BENIGN NON-NODULAR PROSTATIC HYPERPLASIA WITHOUT LOWER URINARY TRACT SYMPTOMS: ICD-10-CM

## 2019-05-03 DIAGNOSIS — J30.1 CHRONIC SEASONAL ALLERGIC RHINITIS DUE TO POLLEN: ICD-10-CM

## 2019-05-03 DIAGNOSIS — E11.9 CONTROLLED TYPE 2 DIABETES MELLITUS WITHOUT COMPLICATION, WITHOUT LONG-TERM CURRENT USE OF INSULIN (H): Chronic | ICD-10-CM

## 2019-05-03 DIAGNOSIS — Z96.651 HX OF TOTAL KNEE ARTHROPLASTY, RIGHT: ICD-10-CM

## 2019-05-03 DIAGNOSIS — K59.03 DRUG-INDUCED CONSTIPATION: ICD-10-CM

## 2019-05-03 DIAGNOSIS — R25.2 CRAMP OF BOTH LOWER EXTREMITIES: ICD-10-CM

## 2019-05-03 DIAGNOSIS — Z79.4 TYPE 2 DIABETES MELLITUS WITH CHRONIC KIDNEY DISEASE, WITH LONG-TERM CURRENT USE OF INSULIN, UNSPECIFIED CKD STAGE (H): Primary | ICD-10-CM

## 2019-05-03 DIAGNOSIS — E11.9 TYPE 2 DIABETES MELLITUS WITHOUT COMPLICATION, WITHOUT LONG-TERM CURRENT USE OF INSULIN (H): ICD-10-CM

## 2019-05-03 LAB — PSA SERPL-ACNC: 1.64 UG/L (ref 0–4)

## 2019-05-03 PROCEDURE — G0103 PSA SCREENING: HCPCS | Performed by: FAMILY MEDICINE

## 2019-05-03 PROCEDURE — 99214 OFFICE O/P EST MOD 30 MIN: CPT | Performed by: FAMILY MEDICINE

## 2019-05-03 PROCEDURE — 36415 COLL VENOUS BLD VENIPUNCTURE: CPT | Performed by: FAMILY MEDICINE

## 2019-05-03 PROCEDURE — 83036 HEMOGLOBIN GLYCOSYLATED A1C: CPT | Performed by: FAMILY MEDICINE

## 2019-05-03 RX ORDER — LANOLIN ALCOHOL/MO/W.PET/CERES
CREAM (GRAM) TOPICAL 2 TIMES DAILY
Qty: 30 G | Refills: 11 | Status: SHIPPED | OUTPATIENT
Start: 2019-05-03

## 2019-05-03 RX ORDER — LISINOPRIL 2.5 MG/1
2.5 TABLET ORAL DAILY
Qty: 30 TABLET | Refills: 11 | Status: SHIPPED | OUTPATIENT
Start: 2019-05-03 | End: 2020-01-07

## 2019-05-03 RX ORDER — FINASTERIDE 5 MG/1
5 TABLET, FILM COATED ORAL DAILY
Qty: 90 TABLET | Refills: 3 | Status: SHIPPED | OUTPATIENT
Start: 2019-05-03 | End: 2020-01-07

## 2019-05-03 RX ORDER — METFORMIN HCL 500 MG
TABLET, EXTENDED RELEASE 24 HR ORAL
Qty: 60 TABLET | Refills: 11 | Status: SHIPPED | OUTPATIENT
Start: 2019-05-03 | End: 2020-05-18

## 2019-05-03 RX ORDER — TAMSULOSIN HYDROCHLORIDE 0.4 MG/1
0.4 CAPSULE ORAL DAILY
Qty: 90 CAPSULE | Refills: 3 | Status: SHIPPED | OUTPATIENT
Start: 2019-05-03 | End: 2020-01-07

## 2019-05-03 RX ORDER — GLIMEPIRIDE 1 MG/1
1 TABLET ORAL
Qty: 30 TABLET | Refills: 11 | Status: SHIPPED | OUTPATIENT
Start: 2019-05-03 | End: 2019-05-10

## 2019-05-03 RX ORDER — MONTELUKAST SODIUM 10 MG/1
10 TABLET ORAL DAILY
Qty: 30 TABLET | Refills: 11 | Status: SHIPPED | OUTPATIENT
Start: 2019-05-03 | End: 2020-01-07

## 2019-05-03 RX ORDER — CETIRIZINE HYDROCHLORIDE 10 MG/1
10 TABLET ORAL EVERY EVENING
Qty: 30 TABLET | Refills: 3 | Status: SHIPPED | OUTPATIENT
Start: 2019-05-03 | End: 2020-01-07

## 2019-05-03 RX ORDER — ATORVASTATIN CALCIUM 10 MG/1
10 TABLET, FILM COATED ORAL DAILY
Qty: 30 TABLET | Refills: 11 | Status: SHIPPED | OUTPATIENT
Start: 2019-05-03 | End: 2020-06-29

## 2019-05-03 RX ORDER — AMOXICILLIN 250 MG
2 CAPSULE ORAL 2 TIMES DAILY
Qty: 60 TABLET | Refills: 1 | Status: SHIPPED | OUTPATIENT
Start: 2019-05-03 | End: 2020-01-07

## 2019-05-03 RX ORDER — CETIRIZINE HYDROCHLORIDE 10 MG/1
10 TABLET ORAL EVERY EVENING
Qty: 30 TABLET | Refills: 3 | Status: CANCELLED | OUTPATIENT
Start: 2019-05-03

## 2019-05-03 RX ORDER — LIDOCAINE 50 MG/G
PATCH TOPICAL
Qty: 30 PATCH | Refills: 3 | Status: SHIPPED | OUTPATIENT
Start: 2019-05-03 | End: 2020-01-07

## 2019-05-03 NOTE — PATIENT INSTRUCTIONS
(E11.22,  Z79.4) Type 2 diabetes mellitus with chronic kidney disease, with long-term current use of insulin, unspecified CKD stage (H)  (primary encounter diagnosis)  Comment:    Plan: Hemoglobin A1c             (J30.1) Chronic seasonal allergic rhinitis due to pollen  Comment:    Plan: montelukast (SINGULAIR) 10 MG tablet,         cetirizine (ZYRTEC) 10 MG tablet             (H10.10) Seasonal allergic conjunctivitis  Comment:    Plan: montelukast (SINGULAIR) 10 MG tablet,         cetirizine (ZYRTEC) 10 MG tablet             (R63.0) Poor appetite  Comment:    Plan:      (K59.03) Drug-induced constipation  Comment:    Plan: senna-docusate (SENOKOT-S/PERICOLACE) 8.6-50 MG        tablet             (L85.3) Dry skin  Comment:    Plan: mineral oil-white petrolatum (MINERIN/EUCERIN)         CREA cream             (E11.9) Controlled type 2 diabetes mellitus without complication, without long-term current use of insulin (H)  Comment:    Plan: metFORMIN (GLUCOPHAGE-XR) 500 MG 24 hr tablet,         glimepiride (AMARYL) 1 MG tablet             (E11.22,  N18.1) Type 2 diabetes mellitus with stage 1 chronic kidney disease, without long-term current use of insulin (H)  Comment:     Plan: lisinopril (PRINIVIL/ZESTRIL) 2.5 MG tablet             (Z96.651) Hx of total knee arthroplasty, right  Comment:    Plan: lidocaine (LIDODERM) 5 % patch             (N40.0) Benign non-nodular prostatic hyperplasia without lower urinary tract symptoms  Comment:    Plan: finasteride (PROSCAR) 5 MG tablet, tamsulosin         (FLOMAX) 0.4 MG capsule             (Z13.89) Screening for diabetic peripheral neuropathy  Comment:    Plan: cholecalciferol (VITAMIN D-1000 MAX ST) 1000         units TABS              (R25.2) Cramp of both lower extremities  Comment:    Plan: cholecalciferol (VITAMIN D-1000 MAX ST) 1000         units TABS             (I10) Benign essential hypertension  Comment:    Plan:      (E08.42) Diabetic polyneuropathy associated with  "diabetes mellitus due to underlying condition (H)  Comment:    Plan: blood glucose monitoring (MIKHAIL MICROLET)         lancets, blood glucose (MIKHAIL CONTOUR NEXT)         test strip             (E11.9) Type 2 diabetes mellitus without complication, without long-term current use of insulin (H)  Comment:    Plan: blood glucose monitoring (MIKHAIL CONTOUR         MONITOR) meter device kit              (E78.00) Pure hypercholesterolemia  Comment:    Plan: atorvastatin (LIPITOR) 10 MG tablet             (Z12.5) Screening PSA (prostate specific antigen)  Comment:    Plan: Prostate spec antigen screen                What You Can Do to Reduce Cholesterol Levels  and Reduce Risk of Diabetes, Heart, and Blood Vessel Disease  1. Eat six to eight servings of green or root vegetables or fruit (raw or cooked) per day. You need 35 grams of fiber per day.  Examples: carrots apples  broccoli oranges  spinach grapefruit  lettuce pears  bananas  2. Use up to 2 cup of walnuts, almonds, or pecans as a source of \"good\" fat per day.  3. Drink one to three servings of soy milk (great for cereal) or 2 cup of soynuts per day.  4. Use margarine with reduced trans or saturated fats (e.g. Benecol, Smart Balance, olive oil margarine) as replacement for butter or margarine.  5. Eat fewer or half-portions of desserts, baked goods, chips, cookies, processed flour and sugar, pasta, butter, cream, non-skim dairy, ice cream.  6. Use olive or canola oil as the only oils for cooking and dressings.  7. Use one tablespoon of ground flaxseed or Natural Ovens \"Energy Mix\" per day (great on cereal or over salad).  8. Eat one to two servings per week of wild salmon or water-packed tuna.  9. Limit beef, lamb, and pork to at most one serving per day. (Range-fed beef, if you can get it, is much preferred and allows for greater use in diet).  10. Eat three to four servings per day of whole grains (legumes, rice, wheat, oats).  11. Limit sodas and beer at most to " three per week (only special occasions).  12. 65 percent of us need to lose weight and all of us need to keep moving! You should be walking at least 150 minutes per week. You should lose approximately seven percent of your weight in the next year if overweight. Check with me for more details.  1. Andrew Weil's paperback book, The Healthy Kitchen, is a great addition to your healthy lifestyle library.  2. American Diabetic Association (www.diabetes.org) - a wealth of information on nutritional excellence.  3. Other great websites for Mediterranean diets are available.    Stan' Flexion Versus Osmin   Extension Exercises For   Low Back Pain   Examples of Stan' Flexion Exercises  1. Pelvic tilt.  Please press the small of your back against the floor.  Start with 5-10  and increase to 100 count over one month   2. Single Knee to chest. Lie on your back with legs in bent position. Alternate one leg and the other very slowly bringing the knee to chest.  Start with a count of 5-10   Over one month work up to 100  3. Double knee to chest.  Lie on your back with knees in bent position.  Bring both knees to the chest slowly hold for a count of 5-to 10. Over one month work to 100  4. Partial sit-up or crunch.  Lie on your back in bent leg position.  Please bring your body with arms crossed in front  To 30 degrees of flexion. Start with 5-10 over one month work up to 100 or more  5. Sit back.  Please sit on the side of the bed or a stair landing  And lie backwards until the abdominal muscles start to quiver.  Hold for a count of 5-10 and over a month work up to 100.  5. Hamstring stretch.  Please extend your legs while sitting on the floor as tolerated for 5-10 count.  Gradually increase to count of 30 over one month      Alternately find a stair landing or sturdy chair and place heel  In a comfortable level of extension  And stretch one hamstring at a time for 5-10 seconds.  Increase to count of 30 over one month                          Squat.  Stand with legs comfortably apart and lower the body slowly by flexing the knees  for count of 5-10 over one month increase to 30.  Useful for anterior disc protrusion, facette joint arthritis spond-10ylolysis, spondylolisthesis and spinal stenosis   ROTATION AND LATERAL BENDING AS TOLERATED RIGHT OR LEFT     PILLOWS UNDER KNEES AT BED TIME    STRETCHING FORWARDS AND DOWN WHEN SITTING ON CHAIR    MAY SIT IN RELAXED MANNER     WHEN IN PREVENTION PHASE START WITH WITH TRACY EXERCISES AND FOLLOW UP  WITH LARA EXERCISES AS TOLERATED     EDUARDA LAMBERT JR., MD

## 2019-05-03 NOTE — PROGRESS NOTES
SUBJECTIVE:   Chivo Urias is a 73 year old male who presents to clinic today for the following   health issues:  Due to language barrier, an  was present during the history-taking and subsequent discussion (and for part of the physical exam) with this patient.  Form for Special Diet original faxed to [phone], [facility]  Diabetes Follow-up    Patient is checking blood sugars: once daily.  Results are as follows:         am - 140, yesterday 170    Diabetic concerns: None     Symptoms of hypoglycemia (low blood sugar): none     Paresthesias (numbness or burning in feet) or sores: No     Date of last diabetic eye exam: 6 months ago    BP Readings from Last 2 Encounters:   11/13/18 114/60   09/17/18 106/64     Hemoglobin A1C (%)   Date Value   11/13/2018 6.4 (H)   07/30/2018 6.6 (H)     LDL Cholesterol Calculated (mg/dL)   Date Value   07/30/2018 80   09/29/2017 39       Diabetes Management Resources    Amount of exercise or physical activity: 6-7 days/week for an average of 30-45 minutes    Problems taking medications regularly: No    Medication side effects: none    Diet: regular (no restrictions)        Left leg      Duration: 2 weeks    Description (location/character/radiation): lower back/hip down to knee, left side    Intensity:  moderate    Accompanying signs and symptoms: sharp, zinging pain    History (similar episodes/previous evaluation): None    Precipitating or alleviating factors: was taking part in [place] ritual digging dirt     Therapies tried and outcome: None                       Topic Date Due     A1C Q6 MO  05/13/2019               .  Current Outpatient Medications   Medication Sig Dispense Refill     acetaminophen (TYLENOL) 500 MG tablet Take 1-2 tablets (500-1,000 mg) by mouth every 6 hours as needed 100 tablet 11     aspirin 81 MG chewable tablet Take 1 tablet (81 mg) by mouth daily 108 tablet 3     atorvastatin (LIPITOR) 10 MG tablet Take 1 tablet (10 mg)  by mouth daily 30 tablet 11     blood glucose (MIKHAIL CONTOUR NEXT) test strip 1 strip by In Vitro route daily 100 each 11     blood glucose monitoring (MIKHAIL CONTOUR MONITOR) meter device kit Use to test blood sugars TWICE DAILY  times daily or as directed. 1 kit 0     blood glucose monitoring (MIKHAIL MICROLET) lancets 1 each 2 times daily 100 each 11     Blood Pressure Monitoring (WRIST BLOOD PRESSURE MONITOR) MISC 1 each 2 times daily as needed 1 each 0     cetirizine (ZYRTEC) 10 MG tablet Take 1 tablet (10 mg) by mouth every evening 30 tablet 3     cholecalciferol (VITAMIN D-1000 MAX ST) 1000 units TABS Take 3 tablets by mouth daily 90 tablet 11     finasteride (PROSCAR) 5 MG tablet Take 1 tablet (5 mg) by mouth daily 90 tablet 3     glimepiride (AMARYL) 1 MG tablet Take 1 tablet (1 mg) by mouth every morning (before breakfast) 30 tablet 11     lidocaine (LIDODERM) 5 % patch On am off hour of sleep 30 patch 3     lisinopril (PRINIVIL/ZESTRIL) 2.5 MG tablet Take 1 tablet (2.5 mg) by mouth daily 30 tablet 11     metFORMIN (GLUCOPHAGE-XR) 500 MG 24 hr tablet TAKE 1 TABLET BY MOUTH TWICE DAILY WITH MEALS 60 tablet 11     mineral oil-white petrolatum (MINERIN/EUCERIN) CREA cream Apply topically 2 times daily 30 g 11     montelukast (SINGULAIR) 10 MG tablet Take 1 tablet (10 mg) by mouth daily 30 tablet 11     senna-docusate (SENOKOT-S/PERICOLACE) 8.6-50 MG tablet Take 2 tablets by mouth 2 times daily 60 tablet 1     tamsulosin (FLOMAX) 0.4 MG capsule Take 1 capsule (0.4 mg) by mouth daily 90 capsule 3              No Known Allergies      Immunization History   Administered Date(s) Administered     Pneumococcal 23 valent 03/05/2013     Tetanus 01/01/2010               reports that he does not drink alcohol.          reports that he does not use drugs.        family history includes Family History Negative in his father and mother.        indicated that his mother is alive. He indicated that his father is  alive.             has no past surgical history on file.         reports that he does not currently engage in sexual activity.    .  Pediatric History   Patient Guardian Status     Not on file     Other Topics Concern     Parent/sibling w/ CABG, MI or angioplasty before 65F 55M? No   Social History Narrative    Surgical History  Return To Top     Status Surgery Time Frame Comment Record Date     Inactive  NONE      11/14/2007          --------------------------------------------------------------------------------    Food Allergy  Return To Top     Allergen Reaction Comment Record Date     * No known food allergies      11/14/2007          --------------------------------------------------------------------------------    Drug Allergy  Return To Top     Allergen Reaction Comment Record Date     * No known drug allergies      5/15/2007          --------------------------------------------------------------------------------    Environment Allergy  Return To Top     Allergen Reaction Comment Record Date     * No known environmental allergies      11/14/2007          --------------------------------------------------------------------------------    Social History  Return To Top     Question Answer Comment Record Date     Marital status      11/14/2007      Advance Directive or Living Will  No    5/18/2010      Emotional Abuse  No    7/1/2011      Exercise  Yes SOMETIMES  walks alot.  11/14/2007      Caffeine  Yes  Tea  1/11/2008      Physical Abuse  No    7/1/2011      Sealtbelts  Yes    11/14/2007      Sexual Abuse  No     7/1/2011      Breast/Testicle Self Check  Yes    11/14/2007      Number of children  7  4 GIRLS AND 3 BOYS  7/17/2007      Living arrangements  Apartment/Condo    11/14/2007      Number of children in household  0    11/14/2007      Number of adults in household  1    11/14/2007      Education level  High School Graduate    11/14/2007      Employment  Currently unemployed    11/14/2007       Tobacco history  Has never smoked or chewed tobacco    8/29/2008      Alcohol history  Never drinks alcohol    11/14/2007      Has the patient ever used illegal drugs?  Has never used illegal drugs    7/1/2011      Has the patient used marijuana?  No    7/1/2011      Has the patient used cocaine?  No    7/1/2011          --------------------------------------------------------------------------------    Medical History Return To Top     Status Diagnosis Time Frame Comment Record Date     Active (729.1) - C - Myalgia      8/11/2011      Active (726.79) - C - Sub-Achilles bursitis      8/11/2011      Active (700) - C - Corn/callus    o  7/1/2011      Active (272.4) - C - Hyperlipidemia      7/1/2011      Active (V81.2) - C - SCREEN-CARDIOVASC NEC      6/21/2011      Active (V82.9) - C - Screening, vitamin d deficiency      4/8/2011      Active (V77.91) - C - Screening, lipids      4/8/2011      Active (V76.51) - C - Screening, colon      4/8/2011      Active (780.79) - C - Fatigue      5/18/2010      Active V76.44 Screening, prostate      10/7/2009      Active (780.79) - C - Fatigue      10/7/2009      Active 477.9 Rhinitis, allergic, cause unspecified      8/29/2008      Active (782.0) - C - Numbness    RIGHT LEG  6/19/2008      Active 780.79 Fatigue      2/20/2008      Active (784.0) - C - Headache, unspec.      2/1/2008      Active 724.3 Sciatica    chronic, with worsening weakness and sensory changes in S1 distribution  1/11/2008      Active 608.4 MALE GEN INFLAM DIS OT      11/20/2007      Active 608.9 MALE GENITAL DIS UNSPEC      11/14/2007      Active V78.3 Screening, HGB      11/14/2007      Active 355.9 MONONEURITIS UNSPEC      7/17/2007      Active 365.9 UNSPECIFIED GLAUCOMA      5/16/2007      Active (719.46) - C - Knee pain      5/16/2007      Active (729.5) - C - limb pain    both legs muscle aches  5/16/2007      Active (724.2) - C - Low back pain      5/16/2007      Inactive  (780.79) - C - Fatigue     IMPROVED   10/22/2009      Inactive  (780.79) - C - Fatigue    IMPROVED  6/19/2008      Inactive  782.0 Numbness      5/16/2007          ----------------------------------------------------        --------------------------------------------------------------------------------    Family History  Return To Top     Status Relationship Disease Comment Record Date     Alive Mother      11/14/2007      Alive Father      11/14/2007          --------------------------------------------------------------------------------                       reports that he has never smoked. He has never used smokeless tobacco.        Medical, social, surgical, and family histories reviewed.        Labs reviewed in EPIC  Patient Active Problem List   Diagnosis     Health Care Home     Myalgia and myositis     Allergic rhinitis     Sciatica     Glaucoma     Foot pain, left     Controlled type 2 diabetes mellitus without complication (H)     Hyperlipidemia with target LDL less than 100     Vitamin D insufficiency     DDD (degenerative disc disease), lumbar     Stage 1 chronic renal impairment associated with type 2 diabetes mellitus (H)     Comprehensive diabetic foot examination, type 2 DM, encounter for (H)     Low HDL (under 40)     Hypertension goal BP (blood pressure) < 130/80     CKD (chronic kidney disease) stage 2, GFR 60-89 ml/min     Right hip pain     Type 2 diabetes, HbA1C goal < 8% (H)     ACP (advance care planning)     Enthesopathy of right hip region         History reviewed. No pertinent surgical history.    Social History     Tobacco Use     Smoking status: Never Smoker     Smokeless tobacco: Never Used   Substance Use Topics     Alcohol use: No       Family History   Problem Relation Age of Onset     Family History Negative Mother      Family History Negative Father              Current Outpatient Medications   Medication Sig Dispense Refill     acetaminophen (TYLENOL) 500 MG tablet Take 1-2 tablets (500-1,000 mg) by  mouth every 6 hours as needed 100 tablet 11     aspirin 81 MG chewable tablet Take 1 tablet (81 mg) by mouth daily 108 tablet 3     atorvastatin (LIPITOR) 10 MG tablet Take 1 tablet (10 mg) by mouth daily 30 tablet 11     blood glucose (MIKHAIL CONTOUR NEXT) test strip 1 strip by In Vitro route daily 100 each 11     blood glucose monitoring (MIKHAIL CONTOUR MONITOR) meter device kit Use to test blood sugars TWICE DAILY  times daily or as directed. 1 kit 0     blood glucose monitoring (MIKHAIL MICROLET) lancets 1 each 2 times daily 100 each 11     Blood Pressure Monitoring (WRIST BLOOD PRESSURE MONITOR) MISC 1 each 2 times daily as needed 1 each 0     cetirizine (ZYRTEC) 10 MG tablet Take 1 tablet (10 mg) by mouth every evening 30 tablet 3     cholecalciferol (VITAMIN D-1000 MAX ST) 1000 units TABS Take 3 tablets by mouth daily 90 tablet 11     finasteride (PROSCAR) 5 MG tablet Take 1 tablet (5 mg) by mouth daily 90 tablet 3     glimepiride (AMARYL) 1 MG tablet Take 1 tablet (1 mg) by mouth every morning (before breakfast) 30 tablet 11     lidocaine (LIDODERM) 5 % patch On am off hour of sleep 30 patch 3     lisinopril (PRINIVIL/ZESTRIL) 2.5 MG tablet Take 1 tablet (2.5 mg) by mouth daily 30 tablet 11     metFORMIN (GLUCOPHAGE-XR) 500 MG 24 hr tablet TAKE 1 TABLET BY MOUTH TWICE DAILY WITH MEALS 60 tablet 11     mineral oil-white petrolatum (MINERIN/EUCERIN) CREA cream Apply topically 2 times daily 30 g 11     montelukast (SINGULAIR) 10 MG tablet Take 1 tablet (10 mg) by mouth daily 30 tablet 11     senna-docusate (SENOKOT-S/PERICOLACE) 8.6-50 MG tablet Take 2 tablets by mouth 2 times daily 60 tablet 1     tamsulosin (FLOMAX) 0.4 MG capsule Take 1 capsule (0.4 mg) by mouth daily 90 capsule 3           Recent Labs   Lab Test 11/13/18  0927 07/30/18  0820 06/28/18  0823 09/29/17  0832 06/08/17  0808 12/08/16  0920 11/10/16  1028   A1C 6.4* 6.6*  --  7.1* 7.6*  --  6.9*   LDL  --  80  --  39  --   --  62   HDL  --  40  --  40   --   --  42   TRIG  --  189*  --  306*  --   --  216*   ALT  --  22  --  24 23  --  32   CR  --  0.96 1.03 0.90 0.88  --  1.08   GFRESTIMATED  --  77 70 83 85  --  67   GFRESTBLACK  --  >90 85 >90 >90   GFR Calc    --  81   POTASSIUM  --  4.1 4.3 4.2 4.0  --  4.2   TSH 1.21  --   --   --   --  0.99  --             BP Readings from Last 6 Encounters:   05/03/19 132/74   11/13/18 114/60   09/17/18 106/64   08/20/18 112/64   07/30/18 118/66   07/23/18 124/72           Wt Readings from Last 3 Encounters:   05/03/19 83.9 kg (185 lb)   11/13/18 81.6 kg (180 lb)   09/17/18 79.4 kg (175 lb)                 Positive symptoms or findings indicated by bold designation:         ROS: 10 point ROS neg other than the symptoms noted above in the HPI.except  has Health Care Home; Myalgia and myositis; Allergic rhinitis; Sciatica; Glaucoma; Foot pain, left; Controlled type 2 diabetes mellitus without complication (H); Hyperlipidemia with target LDL less than 100; Vitamin D insufficiency; DDD (degenerative disc disease), lumbar; Stage 1 chronic renal impairment associated with type 2 diabetes mellitus (H); Comprehensive diabetic foot examination, type 2 DM, encounter for (H); Low HDL (under 40); Hypertension goal BP (blood pressure) < 130/80; CKD (chronic kidney disease) stage 2, GFR 60-89 ml/min; Right hip pain; Type 2 diabetes, HbA1C goal < 8% (H); ACP (advance care planning); and Enthesopathy of right hip region on their problem list.  Review Of Systems    Skin: negative    Eyes: negative    Ears/Nose/Throat: negative    Respiratory: No shortness of breath, dyspnea on exertion, cough, or hemoptysis    Cardiovascular: negative    Gastrointestinal: negative    Genitourinary: Urinary hesitancy and frequency    Mild BPH symptoms responding to finasteride and tamsulosin    Nevertheless patient wants to get a PSA test today    Musculoskeletal: See history of present illness    Low back pain with radicular pain down left  thigh    Neurologic: negative    Psychiatric: negative    Hematologic/Lymphatic/Immunologic: negative    Endocrine: diabetes                PE:  /74   Pulse 59   Temp 97.9  F (36.6  C) (Tympanic)   Resp 16   Wt 83.9 kg (185 lb)   SpO2 99%   BMI 24.08 kg/m   Body mass index is 24.08 kg/m .        Constitutional: general appearance, well nourished, well developed, in no acute distress, well developed, appears stated age, normal body habitus,          Eyes:; The patient has normal eyelids sclerae and conjunctivae :          Ears/Nose/Throat: external ear, overall: normal appearance; external nose, overall: benign appearance, normal moujth gums and lips           Neck: thyroid, overall: normal size, normal consistency, nontender,          Respiratory:  palpation of chest, overall: normal excursion,    Clear to percussion and auscultation     NO Tachypnea    NORMAL  Color          Cardiovascular:  Good color with no peripheral edema    Regular sinus rhythm without murmur.  Physiologic heart sounds   Heart is unelarged    .     Chest/Breast: normal shape           Abdominal exam,  Liver and spleen are  unenlarged        Tenderness    Scars              Urogenital; no renal, flank or bladder  tenderness;          Lymphatic: neck nodes,     Other nodes         Musculoskeletal:  Brief ortho exam normal except:   I decreased range of motion of back    And is relieved by flexion exercises    Negative straight leg raising test  Reflexes intact  Minimal OA knees        Integument: inspection of skin, no rash, lesions; and, palpation, no induration, no tenderness.          Neurologic mental status, overall: alert and oriented; gait, no ataxia, no unsteadiness; coordination, no tremors; cranial nerves, overall: normal motor, overall: normal bulk, tone.          Psychiatric: orientation/consciousness, overall: oriented to person, place and time; behavior/psychomotor activity, no tics, normal psychomotor activity; mood  and affect, overall: normal mood and affect; appearance, overall: well-groomed, good eye contact; speech, overall: normal quality, no aphasia and normal quality, quantity, intact.        Diagnostic Test Results:  Results for orders placed or performed in visit on 11/13/18   Fecal colorectal cancer screen FIT - Future (S+30)   Result Value Ref Range    Occult Blood Scn FIT Negative NEG^Negative           ICD-10-CM    1. Type 2 diabetes mellitus with chronic kidney disease, with long-term current use of insulin, unspecified CKD stage (H) E11.22 Hemoglobin A1c    Z79.4    2. Chronic seasonal allergic rhinitis due to pollen J30.1 montelukast (SINGULAIR) 10 MG tablet     cetirizine (ZYRTEC) 10 MG tablet   3. Seasonal allergic conjunctivitis H10.10 montelukast (SINGULAIR) 10 MG tablet     cetirizine (ZYRTEC) 10 MG tablet   4. Poor appetite R63.0    5. Drug-induced constipation K59.03 senna-docusate (SENOKOT-S/PERICOLACE) 8.6-50 MG tablet   6. Dry skin L85.3 mineral oil-white petrolatum (MINERIN/EUCERIN) CREA cream   7. Controlled type 2 diabetes mellitus without complication, without long-term current use of insulin (H) E11.9 metFORMIN (GLUCOPHAGE-XR) 500 MG 24 hr tablet     glimepiride (AMARYL) 1 MG tablet   8. Type 2 diabetes mellitus with stage 1 chronic kidney disease, without long-term current use of insulin (H) E11.22 lisinopril (PRINIVIL/ZESTRIL) 2.5 MG tablet    N18.1    9. Hx of total knee arthroplasty, right Z96.651 lidocaine (LIDODERM) 5 % patch   10. Benign non-nodular prostatic hyperplasia without lower urinary tract symptoms N40.0 finasteride (PROSCAR) 5 MG tablet     tamsulosin (FLOMAX) 0.4 MG capsule   11. Screening for diabetic peripheral neuropathy Z13.89 cholecalciferol (VITAMIN D-1000 MAX ST) 1000 units TABS   12. Cramp of both lower extremities R25.2 cholecalciferol (VITAMIN D-1000 MAX ST) 1000 units TABS   13. Benign essential hypertension I10    14. Diabetic polyneuropathy associated with diabetes  mellitus due to underlying condition (H) E08.42 blood glucose monitoring (MIKHAIL MICROLET) lancets     blood glucose (MIKHAIL CONTOUR NEXT) test strip   15. Type 2 diabetes mellitus without complication, without long-term current use of insulin (H) E11.9 blood glucose monitoring (MIKHAIL CONTOUR MONITOR) meter device kit   16. Pure hypercholesterolemia E78.00 atorvastatin (LIPITOR) 10 MG tablet   17. Screening PSA (prostate specific antigen) Z12.5 Prostate spec antigen screen              .    Side effects benefits and risks thoroughly discussed. .he may come in early if unimproved or getting worse          Please drink 2 glasses of water prior to meals and walk 15-30 minutes after meals        I spent 25 minutes with patient discussing the following issues   The primary encounter diagnosis was Type 2 diabetes mellitus with chronic kidney disease, with long-term current use of insulin, unspecified CKD stage (H). Diagnoses of Chronic seasonal allergic rhinitis due to pollen, Seasonal allergic conjunctivitis, Poor appetite, Drug-induced constipation, Dry skin, Controlled type 2 diabetes mellitus without complication, without long-term current use of insulin (H), Type 2 diabetes mellitus with stage 1 chronic kidney disease, without long-term current use of insulin (H), Hx of total knee arthroplasty, right, Benign non-nodular prostatic hyperplasia without lower urinary tract symptoms, Screening for diabetic peripheral neuropathy, Cramp of both lower extremities, Benign essential hypertension, Diabetic polyneuropathy associated with diabetes mellitus due to underlying condition (H), Type 2 diabetes mellitus without complication, without long-term current use of insulin (H), Pure hypercholesterolemia, and Screening PSA (prostate specific antigen) were also pertinent to this visit. over half of which involved counseling and coordination of care.      Patient Instructions   (E11.22,  Z79.4) Type 2 diabetes mellitus with chronic  kidney disease, with long-term current use of insulin, unspecified CKD stage (H)  (primary encounter diagnosis)  Comment:    Plan: Hemoglobin A1c             (J30.1) Chronic seasonal allergic rhinitis due to pollen  Comment:    Plan: montelukast (SINGULAIR) 10 MG tablet,         cetirizine (ZYRTEC) 10 MG tablet             (H10.10) Seasonal allergic conjunctivitis  Comment:    Plan: montelukast (SINGULAIR) 10 MG tablet,         cetirizine (ZYRTEC) 10 MG tablet             (R63.0) Poor appetite  Comment:    Plan:      (K59.03) Drug-induced constipation  Comment:    Plan: senna-docusate (SENOKOT-S/PERICOLACE) 8.6-50 MG        tablet             (L85.3) Dry skin  Comment:    Plan: mineral oil-white petrolatum (MINERIN/EUCERIN)         CREA cream             (E11.9) Controlled type 2 diabetes mellitus without complication, without long-term current use of insulin (H)  Comment:    Plan: metFORMIN (GLUCOPHAGE-XR) 500 MG 24 hr tablet,         glimepiride (AMARYL) 1 MG tablet             (E11.22,  N18.1) Type 2 diabetes mellitus with stage 1 chronic kidney disease, without long-term current use of insulin (H)  Comment:     Plan: lisinopril (PRINIVIL/ZESTRIL) 2.5 MG tablet             (Z96.651) Hx of total knee arthroplasty, right  Comment:    Plan: lidocaine (LIDODERM) 5 % patch             (N40.0) Benign non-nodular prostatic hyperplasia without lower urinary tract symptoms  Comment:    Plan: finasteride (PROSCAR) 5 MG tablet, tamsulosin         (FLOMAX) 0.4 MG capsule             (Z13.89) Screening for diabetic peripheral neuropathy  Comment:    Plan: cholecalciferol (VITAMIN D-1000 MAX ST) 1000         units TABS              (R25.2) Cramp of both lower extremities  Comment:    Plan: cholecalciferol (VITAMIN D-1000 MAX ST) 1000         units TABS             (I10) Benign essential hypertension  Comment:    Plan:      (E08.42) Diabetic polyneuropathy associated with diabetes mellitus due to underlying condition  "(H)  Comment:    Plan: blood glucose monitoring (MIKHAIL MICROLET)         lancets, blood glucose (MIKHAIL CONTOUR NEXT)         test strip             (E11.9) Type 2 diabetes mellitus without complication, without long-term current use of insulin (H)  Comment:    Plan: blood glucose monitoring (MIKHAIL CONTOUR         MONITOR) meter device kit              (E78.00) Pure hypercholesterolemia  Comment:    Plan: atorvastatin (LIPITOR) 10 MG tablet             (Z12.5) Screening PSA (prostate specific antigen)  Comment:    Plan: Prostate spec antigen screen                What You Can Do to Reduce Cholesterol Levels  and Reduce Risk of Diabetes, Heart, and Blood Vessel Disease  1. Eat six to eight servings of green or root vegetables or fruit (raw or cooked) per day. You need 35 grams of fiber per day.  Examples: carrots apples  broccoli oranges  spinach grapefruit  lettuce pears  bananas  2. Use up to 2 cup of walnuts, almonds, or pecans as a source of \"good\" fat per day.  3. Drink one to three servings of soy milk (great for cereal) or 2 cup of soynuts per day.  4. Use margarine with reduced trans or saturated fats (e.g. Benecol, Smart Balance, olive oil margarine) as replacement for butter or margarine.  5. Eat fewer or half-portions of desserts, baked goods, chips, cookies, processed flour and sugar, pasta, butter, cream, non-skim dairy, ice cream.  6. Use olive or canola oil as the only oils for cooking and dressings.  7. Use one tablespoon of ground flaxseed or Natural Ovens \"Energy Mix\" per day (great on cereal or over salad).  8. Eat one to two servings per week of wild salmon or water-packed tuna.  9. Limit beef, lamb, and pork to at most one serving per day. (Range-fed beef, if you can get it, is much preferred and allows for greater use in diet).  10. Eat three to four servings per day of whole grains (legumes, rice, wheat, oats).  11. Limit sodas and beer at most to three per week (only special occasions).  12. 65 " percent of us need to lose weight and all of us need to keep moving! You should be walking at least 150 minutes per week. You should lose approximately seven percent of your weight in the next year if overweight. Check with me for more details.  1. Andrew Weil's paperback book, The Healthy Kitchen, is a great addition to your healthy lifestyle library.  2. American Diabetic Association (www.diabetes.org) - a wealth of information on nutritional excellence.  3. Other great websites for Mediterranean diets are available.    Stan' Flexion Versus Osmin   Extension Exercises For   Low Back Pain   Examples of Stan' Flexion Exercises  1. Pelvic tilt.  Please press the small of your back against the floor.  Start with 5-10  and increase to 100 count over one month   2. Single Knee to chest. Lie on your back with legs in bent position. Alternate one leg and the other very slowly bringing the knee to chest.  Start with a count of 5-10   Over one month work up to 100  3. Double knee to chest.  Lie on your back with knees in bent position.  Bring both knees to the chest slowly hold for a count of 5-to 10. Over one month work to 100  4. Partial sit-up or crunch.  Lie on your back in bent leg position.  Please bring your body with arms crossed in front  To 30 degrees of flexion. Start with 5-10 over one month work up to 100 or more  5. Sit back.  Please sit on the side of the bed or a stair landing  And lie backwards until the abdominal muscles start to quiver.  Hold for a count of 5-10 and over a month work up to 100.  5. Hamstring stretch.  Please extend your legs while sitting on the floor as tolerated for 5-10 count.  Gradually increase to count of 30 over one month      Alternately find a stair landing or sturdy chair and place heel  In a comfortable level of extension  And stretch one hamstring at a time for 5-10 seconds.  Increase to count of 30 over one month                         Squat.  Stand with legs  comfortably apart and lower the body slowly by flexing the knees  for count of 5-10 over one month increase to 30.  Useful for anterior disc protrusion, facette joint arthritis spond-10ylolysis, spondylolisthesis and spinal stenosis   ROTATION AND LATERAL BENDING AS TOLERATED RIGHT OR LEFT     PILLOWS UNDER KNEES AT BED TIME    STRETCHING FORWARDS AND DOWN WHEN SITTING ON CHAIR    MAY SIT IN RELAXED MANNER     WHEN IN PREVENTION PHASE START WITH WITH TRACY EXERCISES AND FOLLOW UP  WITH LARA EXERCISES AS TOLERATED     EDUARDA LAMBERT JR., MD                          ALL THE ABOVE PROBLEMS ARE STABLE AND MED CHANGES AS NOTED        Diet: Mediterranean and diabetic diet        Exercise: Tracy flexion exercises exercises Range of motion, balance, isometric, and strengthening exercises 30 repetitions twice daily of involved joints            .EDUARDA LAMBERT MD 5/3/2019 4:28 PM  May 3, 2019

## 2019-05-03 NOTE — LETTER
May 6, 2019      Chivo Urias  [address]  [address]  [facility] [address]        Dear ,    We are writing to inform you of your test results.  THREE MONTH GLUCOSE AVERAGE AT TARGET LEVEL     KEEP SAME TREATMENT   REPEAT 4-6 MONTHS   NORMAL PSA OR PROSTATE CANCER SCREENING TEST,     Resulted Orders   Hemoglobin A1c   Result Value Ref Range    Hemoglobin A1C 7.1 (H) 0 - 5.6 %      Comment:      Normal <5.7% Prediabetes 5.7-6.4%  Diabetes 6.5% or higher - adopted from ADA   consensus guidelines.     Prostate spec antigen screen   Result Value Ref Range    PSA 1.64 0 - 4 ug/L      Comment:      Assay Method:  Chemiluminescence using Siemens Vista analyzer       If you have any questions or concerns, please call the clinic at the number listed above.       Sincerely,        EDUARDA LAMBERT MD

## 2019-05-06 LAB — HBA1C MFR BLD: 7.1 % (ref 0–5.6)

## 2019-05-09 ENCOUNTER — PATIENT OUTREACH (OUTPATIENT)
Dept: GERIATRIC MEDICINE | Facility: CLINIC | Age: 74
End: 2019-05-09

## 2019-05-09 NOTE — PROGRESS NOTES
[facility] Care Coordination Contact    Member became effective with  [facility] on 5/1/19 with Stevenson GORDON.  Previous Health Plan: [place]/Fee For Service  Previous Care System: [facility]  Previous care coordinators name and number: Laura Romo RN, PHN (self)  Waiver Type: N/A  Last MMIS Entry: Date 3/19/18 and Type Reassessment - EW open  MMIS visit date if different from above: n/a  Services Listed in MMIS:   Member currently receiving the following home care services:     Member currently receiving the following community resources:    [facility] received: No: Requested on n/a   Binta Thakkar  Case Management Specialist  [facility]  [phone]

## 2019-05-09 NOTE — LETTER
[address], [address]  [place] [address]  Phone:  [phone]  Fax:  [phone]        May 22, 2019    CHIVO RADHA MARY  [address]  [address]  [place] [address]    Dear Chivo,    My name is Laura Romo RN, PHN and I am your [facility] Care Coordinator.  You have indicated that you are not interested in meeting with me in person to complete a Health Risk Assessment.     As your care coordinator, I work in collaboration with your primary physician and clinic.  We also want you to be aware of the health plan initiatives that are available to you, free of charge.     Currently, the health plan is supporting the pneumonia vaccine and prescription ordered calcium and vitamin D supplements.  Please call me for more details, my number is listed below.    [facility] encourages all members to contact their care coordinator if they have had any change in condition, a recent emergency room visit or hospital stay.    Thank you,    Laura Romo RN, PHN    E-mail: [email or web address]  Phone: [phone]      [facility]

## 2019-05-09 NOTE — LETTER
May 9, 2019    CHIVO HERNANDEZ  [address]  [address]  [facility] [address]      Dear Chivo,    Welcome to [facility] ([facility]) health program. My name is Laura Romo RN, PHN. I am your [facility] care coordinator.     I will call you soon to see how you are doing and determine what needs you may have. My job is to help connect you to services, complete an assessment, and develop a care plan with you. There is no charge to you for the care coordination and assessment services. Our goal is to keep you as healthy and independent as possible.     [facility] combines the benefits you may already receive from Medical Assistance, Medicare and the Prescription Drug Coverage Program.    Soon you will receive a new [facility] member identification (ID) card from [facility]. When you receive it, please use this card where you get your health services.]    If you have questions, please call me at [phone]. If you reach my voice mail, leave a message and your phone number. If you are hearing impaired, please call the [place] [employer] at 136 or [phone] ([id number] relay service).    Sincerely,    Laura Romo RN, PHN    E-mail: [email or web address]  Phone: [phone]      [facility] ([facility]) is a health plan that contracts with both Medicare and the [place] Medical Assistance (Medicaid) program to provide benefits of both programs to enrollees. Enrollment in [facility] depends on contract renewal.    [facility] [id number] DHS Approved ([id number])  [id number] (11/18)

## 2019-05-10 ENCOUNTER — OFFICE VISIT (OUTPATIENT)
Dept: FAMILY MEDICINE | Facility: CLINIC | Age: 74
End: 2019-05-10
Payer: COMMERCIAL

## 2019-05-10 VITALS
SYSTOLIC BLOOD PRESSURE: 124 MMHG | DIASTOLIC BLOOD PRESSURE: 76 MMHG | BODY MASS INDEX: 23.69 KG/M2 | OXYGEN SATURATION: 96 % | WEIGHT: 182 LBS | TEMPERATURE: 97.9 F | RESPIRATION RATE: 16 BRPM | HEART RATE: 80 BPM

## 2019-05-10 DIAGNOSIS — E11.22 TYPE 2 DIABETES MELLITUS WITH CHRONIC KIDNEY DISEASE, WITH LONG-TERM CURRENT USE OF INSULIN, UNSPECIFIED CKD STAGE (H): Primary | ICD-10-CM

## 2019-05-10 DIAGNOSIS — M51.369 DDD (DEGENERATIVE DISC DISEASE), LUMBAR: ICD-10-CM

## 2019-05-10 DIAGNOSIS — E11.9 CONTROLLED TYPE 2 DIABETES MELLITUS WITHOUT COMPLICATION, WITHOUT LONG-TERM CURRENT USE OF INSULIN (H): Chronic | ICD-10-CM

## 2019-05-10 DIAGNOSIS — Z79.4 TYPE 2 DIABETES MELLITUS WITH CHRONIC KIDNEY DISEASE, WITH LONG-TERM CURRENT USE OF INSULIN, UNSPECIFIED CKD STAGE (H): Primary | ICD-10-CM

## 2019-05-10 PROCEDURE — 99214 OFFICE O/P EST MOD 30 MIN: CPT | Performed by: FAMILY MEDICINE

## 2019-05-10 RX ORDER — GABAPENTIN 100 MG/1
100 CAPSULE ORAL 3 TIMES DAILY
Qty: 90 CAPSULE | Refills: 1 | Status: SHIPPED | OUTPATIENT
Start: 2019-05-10 | End: 2019-07-09

## 2019-05-10 RX ORDER — GLIMEPIRIDE 1 MG/1
1 TABLET ORAL
Qty: 30 TABLET | Refills: 11 | Status: SHIPPED | OUTPATIENT
Start: 2019-05-10 | End: 2020-01-07

## 2019-05-10 RX ORDER — GLIMEPIRIDE 1 MG/1
1 TABLET ORAL
Qty: 30 TABLET | Refills: 11 | Status: SHIPPED | OUTPATIENT
Start: 2019-05-10 | End: 2019-05-10

## 2019-05-10 RX ORDER — GLIPIZIDE 10 MG/1
10 TABLET, FILM COATED, EXTENDED RELEASE ORAL DAILY
Qty: 90 TABLET | Refills: 3 | Status: SHIPPED | OUTPATIENT
Start: 2019-05-10 | End: 2019-05-10

## 2019-05-10 NOTE — PROGRESS NOTES
SUBJECTIVE:   Chivo Urias is a 73 year old male who presents to clinic today for the following   health issues:  Due to language barrier, an  was present during the history-taking and subsequent discussion (and for part of the physical exam) with this patient.    Musculoskeletal problem/pain      Duration: 1 month    Description  Location: left leg    Intensity:  8/10 sometimes 10    Accompanying signs and symptoms: numbness, from knee to hip, cramping    History  Previous similar problem: yes, was seen last week  Previous evaluation:  none    Precipitating or alleviating factors:  Trauma or overuse: YES- shoveling  Aggravating factors include: standing and walking    Therapies tried and outcome: acetaminophen      Diabetes Follow-up    Patient is checking blood sugars: once daily.  Results are as follows:         am - 120-160    Diabetic concerns: None     Symptoms of hypoglycemia (low blood sugar): none     Paresthesias (numbness or burning in feet) or sores: No     Date of last diabetic eye exam: 12/18    BP Readings from Last 2 Encounters:   05/03/19 132/74   11/13/18 114/60     Hemoglobin A1C (%)   Date Value   05/03/2019 7.1 (H)   11/13/2018 6.4 (H)     LDL Cholesterol Calculated (mg/dL)   Date Value   07/30/2018 80   09/29/2017 39       Diabetes Management Resources        .EDUARDA LAMBERT MD .5/10/2019 5:34 PM .May 10, 2019            There are no preventive care reminders to display for this patient.          .  Current Outpatient Medications   Medication Sig Dispense Refill     acetaminophen (TYLENOL) 500 MG tablet Take 1-2 tablets (500-1,000 mg) by mouth every 6 hours as needed 100 tablet 11     aspirin 81 MG chewable tablet Take 1 tablet (81 mg) by mouth daily 108 tablet 3     atorvastatin (LIPITOR) 10 MG tablet Take 1 tablet (10 mg) by mouth daily 30 tablet 11     blood glucose (MIKHAIL CONTOUR NEXT) test strip 1 strip by In Vitro route daily 100 each 11     blood glucose  monitoring (MIKHAIL CONTOUR MONITOR) meter device kit Use to test blood sugars TWICE DAILY  times daily or as directed. 1 kit 0     blood glucose monitoring (MIKHAIL MICROLET) lancets 1 each 2 times daily 100 each 11     Blood Pressure Monitoring (WRIST BLOOD PRESSURE MONITOR) MISC 1 each 2 times daily as needed 1 each 0     cetirizine (ZYRTEC) 10 MG tablet Take 1 tablet (10 mg) by mouth every evening 30 tablet 3     cholecalciferol (VITAMIN D-1000 MAX ST) 1000 units TABS Take 3 tablets by mouth daily 90 tablet 11     finasteride (PROSCAR) 5 MG tablet Take 1 tablet (5 mg) by mouth daily 90 tablet 3     gabapentin (NEURONTIN) 100 MG capsule Take 1 capsule (100 mg) by mouth 3 times daily 90 capsule 1     glimepiride (AMARYL) 1 MG tablet Take 1 tablet (1 mg) by mouth every morning (before breakfast) 30 tablet 11     lidocaine (LIDODERM) 5 % patch On am off hour of sleep 30 patch 3     lisinopril (PRINIVIL/ZESTRIL) 2.5 MG tablet Take 1 tablet (2.5 mg) by mouth daily 30 tablet 11     metFORMIN (GLUCOPHAGE-XR) 500 MG 24 hr tablet TAKE 1 TABLET BY MOUTH TWICE DAILY WITH MEALS 60 tablet 11     mineral oil-white petrolatum (MINERIN/EUCERIN) CREA cream Apply topically 2 times daily 30 g 11     montelukast (SINGULAIR) 10 MG tablet Take 1 tablet (10 mg) by mouth daily 30 tablet 11     senna-docusate (SENOKOT-S/PERICOLACE) 8.6-50 MG tablet Take 2 tablets by mouth 2 times daily 60 tablet 1     tamsulosin (FLOMAX) 0.4 MG capsule Take 1 capsule (0.4 mg) by mouth daily 90 capsule 3              No Known Allergies      Immunization History   Administered Date(s) Administered     Pneumococcal 23 valent 03/05/2013     Tetanus 01/01/2010               reports that he does not drink alcohol.          reports that he does not use drugs.        family history includes Family History Negative in his father and mother.        indicated that his mother is alive. He indicated that his father is alive.             has no past surgical history on  file.         reports that he does not currently engage in sexual activity.    .  Pediatric History   Patient Guardian Status     Not on file     Other Topics Concern     Parent/sibling w/ CABG, MI or angioplasty before 65F 55M? No   Social History Narrative    Surgical History  Return To Top     Status Surgery Time Frame Comment Record Date     Inactive  NONE      11/14/2007          --------------------------------------------------------------------------------    Food Allergy  Return To Top     Allergen Reaction Comment Record Date     * No known food allergies      11/14/2007          --------------------------------------------------------------------------------    Drug Allergy  Return To Top     Allergen Reaction Comment Record Date     * No known drug allergies      5/15/2007          --------------------------------------------------------------------------------    Environment Allergy  Return To Top     Allergen Reaction Comment Record Date     * No known environmental allergies      11/14/2007          --------------------------------------------------------------------------------    Social History  Return To Top     Question Answer Comment Record Date     Marital status      11/14/2007      Advance Directive or Living Will  No    5/18/2010      Emotional Abuse  No    7/1/2011      Exercise  Yes SOMETIMES  walks alot.  11/14/2007      Caffeine  Yes  Tea  1/11/2008      Physical Abuse  No    7/1/2011      Sealtbelts  Yes    11/14/2007      Sexual Abuse  No     7/1/2011      Breast/Testicle Self Check  Yes    11/14/2007      Number of children  7  4 GIRLS AND 3 BOYS  7/17/2007      Living arrangements  Apartment/Condo    11/14/2007      Number of children in household  0    11/14/2007      Number of adults in household  1    11/14/2007      Education level  High School Graduate    11/14/2007      Employment  Currently unemployed    11/14/2007      Tobacco history  Has never smoked or chewed tobacco     8/29/2008      Alcohol history  Never drinks alcohol    11/14/2007      Has the patient ever used illegal drugs?  Has never used illegal drugs    7/1/2011      Has the patient used marijuana?  No    7/1/2011      Has the patient used cocaine?  No    7/1/2011          --------------------------------------------------------------------------------    Medical History Return To Top     Status Diagnosis Time Frame Comment Record Date     Active (729.1) - C - Myalgia      8/11/2011      Active (726.79) - C - Sub-Achilles bursitis      8/11/2011      Active (700) - C - Corn/callus    o  7/1/2011      Active (272.4) - C - Hyperlipidemia      7/1/2011      Active (V81.2) - C - SCREEN-CARDIOVASC NEC      6/21/2011      Active (V82.9) - C - Screening, vitamin d deficiency      4/8/2011      Active (V77.91) - C - Screening, lipids      4/8/2011      Active (V76.51) - C - Screening, colon      4/8/2011      Active (780.79) - C - Fatigue      5/18/2010      Active V76.44 Screening, prostate      10/7/2009      Active (780.79) - C - Fatigue      10/7/2009      Active 477.9 Rhinitis, allergic, cause unspecified      8/29/2008      Active (782.0) - C - Numbness    RIGHT LEG  6/19/2008      Active 780.79 Fatigue      2/20/2008      Active (784.0) - C - Headache, unspec.      2/1/2008      Active 724.3 Sciatica    chronic, with worsening weakness and sensory changes in S1 distribution  1/11/2008      Active 608.4 MALE GEN INFLAM DIS OT      11/20/2007      Active 608.9 MALE GENITAL DIS UNSPEC      11/14/2007      Active V78.3 Screening, HGB      11/14/2007      Active 355.9 MONONEURITIS UNSPEC      7/17/2007      Active 365.9 UNSPECIFIED GLAUCOMA      5/16/2007      Active (719.46) - C - Knee pain      5/16/2007      Active (729.5) - C - limb pain    both legs muscle aches  5/16/2007      Active (724.2) - C - Low back pain      5/16/2007      Inactive  (780.79) - C - Fatigue    IMPROVED   10/22/2009      Inactive  (780.79) - C -  Fatigue    IMPROVED  6/19/2008      Inactive  782.0 Numbness      5/16/2007          ----------------------------------------------------        --------------------------------------------------------------------------------    Family History  Return To Top     Status Relationship Disease Comment Record Date     Alive Mother      11/14/2007      Alive Father      11/14/2007          --------------------------------------------------------------------------------                       reports that he has never smoked. He has never used smokeless tobacco.        Medical, social, surgical, and family histories reviewed.        Labs reviewed in EPIC  Patient Active Problem List   Diagnosis     Myalgia and myositis     Allergic rhinitis     Sciatica     Glaucoma     Foot pain, left     Controlled type 2 diabetes mellitus without complication (H)     Hyperlipidemia with target LDL less than 100     Vitamin D insufficiency     DDD (degenerative disc disease), lumbar     Stage 1 chronic renal impairment associated with type 2 diabetes mellitus (H)     Comprehensive diabetic foot examination, type 2 DM, encounter for (H)     Low HDL (under 40)     Hypertension goal BP (blood pressure) < 130/80     CKD (chronic kidney disease) stage 2, GFR 60-89 ml/min     Right hip pain     Type 2 diabetes, HbA1C goal < 8% (H)     ACP (advance care planning)     Enthesopathy of right hip region         History reviewed. No pertinent surgical history.    Social History     Tobacco Use     Smoking status: Never Smoker     Smokeless tobacco: Never Used   Substance Use Topics     Alcohol use: No       Family History   Problem Relation Age of Onset     Family History Negative Mother      Family History Negative Father              Current Outpatient Medications   Medication Sig Dispense Refill     acetaminophen (TYLENOL) 500 MG tablet Take 1-2 tablets (500-1,000 mg) by mouth every 6 hours as needed 100 tablet 11     aspirin 81 MG chewable tablet  Take 1 tablet (81 mg) by mouth daily 108 tablet 3     atorvastatin (LIPITOR) 10 MG tablet Take 1 tablet (10 mg) by mouth daily 30 tablet 11     blood glucose (MIKHAIL CONTOUR NEXT) test strip 1 strip by In Vitro route daily 100 each 11     blood glucose monitoring (MIKHAIL CONTOUR MONITOR) meter device kit Use to test blood sugars TWICE DAILY  times daily or as directed. 1 kit 0     blood glucose monitoring (MIKHAIL MICROLET) lancets 1 each 2 times daily 100 each 11     Blood Pressure Monitoring (WRIST BLOOD PRESSURE MONITOR) MISC 1 each 2 times daily as needed 1 each 0     cetirizine (ZYRTEC) 10 MG tablet Take 1 tablet (10 mg) by mouth every evening 30 tablet 3     cholecalciferol (VITAMIN D-1000 MAX ST) 1000 units TABS Take 3 tablets by mouth daily 90 tablet 11     finasteride (PROSCAR) 5 MG tablet Take 1 tablet (5 mg) by mouth daily 90 tablet 3     gabapentin (NEURONTIN) 100 MG capsule Take 1 capsule (100 mg) by mouth 3 times daily 90 capsule 1     glimepiride (AMARYL) 1 MG tablet Take 1 tablet (1 mg) by mouth every morning (before breakfast) 30 tablet 11     lidocaine (LIDODERM) 5 % patch On am off hour of sleep 30 patch 3     lisinopril (PRINIVIL/ZESTRIL) 2.5 MG tablet Take 1 tablet (2.5 mg) by mouth daily 30 tablet 11     metFORMIN (GLUCOPHAGE-XR) 500 MG 24 hr tablet TAKE 1 TABLET BY MOUTH TWICE DAILY WITH MEALS 60 tablet 11     mineral oil-white petrolatum (MINERIN/EUCERIN) CREA cream Apply topically 2 times daily 30 g 11     montelukast (SINGULAIR) 10 MG tablet Take 1 tablet (10 mg) by mouth daily 30 tablet 11     senna-docusate (SENOKOT-S/PERICOLACE) 8.6-50 MG tablet Take 2 tablets by mouth 2 times daily 60 tablet 1     tamsulosin (FLOMAX) 0.4 MG capsule Take 1 capsule (0.4 mg) by mouth daily 90 capsule 3           Recent Labs   Lab Test 05/03/19  1514 11/13/18  0927 07/30/18  0820 06/28/18  0823 09/29/17  0832 06/08/17  0808 12/08/16  0920 11/10/16  1028   A1C 7.1* 6.4* 6.6*  --  7.1* 7.6*  --  6.9*   LDL  --    --  80  --  39  --   --  62   HDL  --   --  40  --  40  --   --  42   TRIG  --   --  189*  --  306*  --   --  216*   ALT  --   --  22  --  24 23  --  32   CR  --   --  0.96 1.03 0.90 0.88  --  1.08   GFRESTIMATED  --   --  77 70 83 85  --  67   GFRESTBLACK  --   --  >90 85 >90 >90   GFR Calc    --  81   POTASSIUM  --   --  4.1 4.3 4.2 4.0  --  4.2   TSH  --  1.21  --   --   --   --  0.99  --             BP Readings from Last 6 Encounters:   05/10/19 124/76   05/03/19 132/74   11/13/18 114/60   09/17/18 106/64   08/20/18 112/64   07/30/18 118/66           Wt Readings from Last 3 Encounters:   05/10/19 82.6 kg (182 lb)   05/03/19 83.9 kg (185 lb)   11/13/18 81.6 kg (180 lb)                 Positive symptoms or findings indicated by bold designation:         ROS: 10 point ROS neg other than the symptoms noted above in the HPI.except  has Myalgia and myositis; Allergic rhinitis; Sciatica; Glaucoma; Foot pain, left; Controlled type 2 diabetes mellitus without complication (H); Hyperlipidemia with target LDL less than 100; Vitamin D insufficiency; DDD (degenerative disc disease), lumbar; Stage 1 chronic renal impairment associated with type 2 diabetes mellitus (H); Comprehensive diabetic foot examination, type 2 DM, encounter for (H); Low HDL (under 40); Hypertension goal BP (blood pressure) < 130/80; CKD (chronic kidney disease) stage 2, GFR 60-89 ml/min; Right hip pain; Type 2 diabetes, HbA1C goal < 8% (H); ACP (advance care planning); and Enthesopathy of right hip region on their problem list.  Review Of Systems    Episodic myalgias    Allergic rhinitis is symptomatic present    Personal history of glaucoma    Left foot pain    Chronic type 2 diabetes hello    Skin: negative    Eyes: negative    Ears/Nose/Throat: negative    Respiratory: No shortness of breath, dyspnea on exertion, cough, or hemoptysis    Cardiovascular: negative    Gastrointestinal: negative    Genitourinary:  negative    Musculoskeletal: negative    Neurologic: negative    Psychiatric: negative    Hematologic/Lymphatic/Immunologic: negative    Endocrine: diabetes                PE:  /76   Pulse 80   Temp 97.9  F (36.6  C) (Tympanic)   Resp 16   Wt 82.6 kg (182 lb)   SpO2 96%   BMI 23.69 kg/m   Body mass index is 23.69 kg/m .        Constitutional: general appearance, well nourished, well developed, in no acute distress, well developed, appears stated age, normal body habitus,          Eyes:; The patient has normal eyelids sclerae and conjunctivae :          Ears/Nose/Throat: external ear, overall: normal appearance; external nose, overall: benign appearance, normal moujth gums and lips           Neck: thyroid, overall: normal size, normal consistency, nontender,          Respiratory:  palpation of chest, overall: normal excursion,    Clear to percussion and auscultation     NO Tachypnea    NORMAL  Color          Cardiovascular:  Good color with no peripheral edema    Regular sinus rhythm without murmur.  Physiologic heart sounds   Heart is unelarged    .     Chest/Breast: normal shape           Abdominal exam,  Liver and spleen are  unenlarged        Tenderness     Scars              Urogenital; no renal, flank or bladder  tenderness;          Lymphatic: neck nodes,     Other nodes         Musculoskeletal:  Brief ortho exam normal except:   WITHIN NORMAL LIMITS         Integument: inspection of skin, no rash, lesions; and, palpation, no induration, no tenderness.          Neurologic mental status, overall: alert and oriented; gait, no ataxia, no unsteadiness; coordination, no tremors; cranial nerves, overall: normal motor, overall: normal bulk, tone.          Psychiatric: orientation/consciousness, overall: oriented to person, place and time; behavior/psychomotor activity, no tics, normal psychomotor activity; mood and affect, overall: normal mood and affect; appearance, overall: well-groomed, good eye  contact; speech, overall: normal quality, no aphasia and normal quality, quantity, intact.        Diagnostic Test Results:  Results for orders placed or performed in visit on 05/03/19   Hemoglobin A1c   Result Value Ref Range    Hemoglobin A1C 7.1 (H) 0 - 5.6 %   Prostate spec antigen screen   Result Value Ref Range    PSA 1.64 0 - 4 ug/L           ICD-10-CM    1. Type 2 diabetes mellitus with chronic kidney disease, with long-term current use of insulin, unspecified CKD stage (H) E11.22 DISCONTINUED: glipiZIDE (GLUCOTROL XL) 10 MG 24 hr tablet    Z79.4    2. DDD (degenerative disc disease), lumbar M51.36 PHYSICAL THERAPY REFERRAL     gabapentin (NEURONTIN) 100 MG capsule   3. Controlled type 2 diabetes mellitus without complication, without long-term current use of insulin (H) E11.9 glimepiride (AMARYL) 1 MG tablet     DISCONTINUED: glimepiride (AMARYL) 1 MG tablet              .    Side effects benefits and risks thoroughly discussed. .he may come in early if unimproved or getting worse          Please drink 2 glasses of water prior to meals and walk 15-30 minutes after meals        I spent  25 MINUTES SPENT  with patient discussing the following issues   The primary encounter diagnosis was Type 2 diabetes mellitus with chronic kidney disease, with long-term current use of insulin, unspecified CKD stage (H). Diagnoses of DDD (degenerative disc disease), lumbar and Controlled type 2 diabetes mellitus without complication, without long-term current use of insulin (H) were also pertinent to this visit. over half of which involved counseling and coordination of care.      Patient Instructions   Stan' Flexion Versus Osmin   Extension Exercises For   Low Back Pain   Examples of Stan' Flexion Exercises  1. Pelvic tilt.  Please press the small of your back against the floor.  Start with 5-10  and increase to 100 count over one month   2. Single Knee to chest. Lie on your back with legs in bent position.  Alternate one leg and the other very slowly bringing the knee to chest.  Start with a count of 5-10   Over one month work up to 100  3. Double knee to chest.  Lie on your back with knees in bent position.  Bring both knees to the chest slowly hold for a count of 5-to 10. Over one month work to 100  4. Partial sit-up or crunch.  Lie on your back in bent leg position.  Please bring your body with arms crossed in front  To 30 degrees of flexion. Start with 5-10 over one month work up to 100 or more  5. Sit back.  Please sit on the side of the bed or a stair landing  And lie backwards until the abdominal muscles start to quiver.  Hold for a count of 5-10 and over a month work up to 100.  5. Hamstring stretch.  Please extend your legs while sitting on the floor as tolerated for 5-10 count.  Gradually increase to count of 30 over one month      Alternately find a stair landing or sturdy chair and place heel  In a comfortable level of extension  And stretch one hamstring at a time for 5-10 seconds.  Increase to count of 30 over one month                         Squat.  Stand with legs comfortably apart and lower the body slowly by flexing the knees  for count of 5-10 over one month increase to 30.  Useful for anterior disc protrusion, facette joint arthritis spond-10ylolysis, spondylolisthesis and spinal stenosis   ROTATION AND LATERAL BENDING AS TOLERATED RIGHT OR LEFT     PILLOWS UNDER KNEES AT BED TIME    STRETCHING FORWARDS AND DOWN WHEN SITTING ON CHAIR    MAY SIT IN RELAXED MANNER     WHEN IN PREVENTION PHASE START WITH WITH TRACY EXERCISES AND FOLLOW UP  WITH LARA EXERCISES AS TOLERATED     EDUARDA LAMBERT JR., MD     [facility]   [address]   PHONE [phone]  [phone]  PHYSICAL THERAPY 2 X PER WEEK X 4 WEEKS     (E11.22,  Z79.4) Type 2 diabetes mellitus with chronic kidney disease, with long-term current use of insulin, unspecified CKD stage (H)  (primary  encounter diagnosis)  Comment:     Plan: glipiZIDE (GLUCOTROL XL) 10 MG 24 hr tablet          (M51.36) DDD (degenerative disc disease), lumbar  Comment:    Plan: PHYSICAL THERAPY REFERRAL  GABAPENTIN 100MG PO HOUR OF SLEEP X 3 DAYS  THEN TWICE DAILY X 3 DAYS  THEN THREE TIMES DAILY                                ALL THE ABOVE PROBLEMS ARE STABLE AND MED CHANGES AS NOTED        Diet:  MEDITERRANEAN DIET AND DIABETES is made        Exercise: Right eye exercises Range of motion, balance, isometric, and strengthening exercises 30 repetitions twice daily of involved joints            .EDUARDA LAMBERT MD 5/10/2019 5:34 PM  May 10, 2019

## 2019-05-10 NOTE — PATIENT INSTRUCTIONS
Stan' Flexion Versus Lara   Extension Exercises For   Low Back Pain   Examples of Stan' Flexion Exercises  1. Pelvic tilt.  Please press the small of your back against the floor.  Start with 5-10  and increase to 100 count over one month   2. Single Knee to chest. Lie on your back with legs in bent position. Alternate one leg and the other very slowly bringing the knee to chest.  Start with a count of 5-10   Over one month work up to 100  3. Double knee to chest.  Lie on your back with knees in bent position.  Bring both knees to the chest slowly hold for a count of 5-to 10. Over one month work to 100  4. Partial sit-up or crunch.  Lie on your back in bent leg position.  Please bring your body with arms crossed in front  To 30 degrees of flexion. Start with 5-10 over one month work up to 100 or more  5. Sit back.  Please sit on the side of the bed or a stair landing  And lie backwards until the abdominal muscles start to quiver.  Hold for a count of 5-10 and over a month work up to 100.  5. Hamstring stretch.  Please extend your legs while sitting on the floor as tolerated for 5-10 count.  Gradually increase to count of 30 over one month      Alternately find a stair landing or sturdy chair and place heel  In a comfortable level of extension  And stretch one hamstring at a time for 5-10 seconds.  Increase to count of 30 over one month                         Squat.  Stand with legs comfortably apart and lower the body slowly by flexing the knees  for count of 5-10 over one month increase to 30.  Useful for anterior disc protrusion, facette joint arthritis spond-10ylolysis, spondylolisthesis and spinal stenosis   ROTATION AND LATERAL BENDING AS TOLERATED RIGHT OR LEFT     PILLOWS UNDER KNEES AT BED TIME    STRETCHING FORWARDS AND DOWN WHEN SITTING ON CHAIR    MAY SIT IN RELAXED MANNER     WHEN IN PREVENTION PHASE START WITH WITH STAN EXERCISES AND FOLLOW UP  WITH LARA EXERCISES AS TOLERATED      EDUARDA LAMBERT JR., MD     [address]   PHONE [phone]  [phone]  PHYSICAL THERAPY 2 X PER WEEK X 4 WEEKS     (E11.22,  Z79.4) Type 2 diabetes mellitus with chronic kidney disease, with long-term current use of insulin, unspecified CKD stage (H)  (primary encounter diagnosis)  Comment:     Plan: glipiZIDE (GLUCOTROL XL) 10 MG 24 hr tablet          (M51.36) DDD (degenerative disc disease), lumbar  Comment:    Plan: PHYSICAL THERAPY REFERRAL  GABAPENTIN 100MG PO HOUR OF SLEEP X 3 DAYS  THEN TWICE DAILY X 3 DAYS  THEN THREE TIMES DAILY

## 2019-05-16 NOTE — PROGRESS NOTES
[facility] Care Coordination Contact    Left member a vm requesting to return call.     Laura Romo RN BSN PHN  [facility] Care Coordinator  [phone]  Fax:[phone]

## 2019-05-20 NOTE — PROGRESS NOTES
[facility] Care Coordination Contact    Left second vm requesting client to call back.     Laura Romo RN BSN PHN  [facility] Care Coordinator  [phone]  Fax:[phone]

## 2019-05-21 NOTE — PROGRESS NOTES
Client returned call and stated he does not want to be seen this month due to observing Ramadan.   Requested CMS to send client a refusal letter. MMIS entered.     Laura Romo RN BSN N  [facility] Coordinator  [phone]  Fax:[phone]

## 2019-05-22 NOTE — PROGRESS NOTES
"[facility] Care Coordination Contact    Per CC, mailed client a \"Refusal of Home Visit\" letter.  MMIS entry.    Binta Thakkar  Case Management Specialist  [facility]  [phone]      "

## 2019-06-28 ENCOUNTER — TRANSFERRED RECORDS (OUTPATIENT)
Dept: HEALTH INFORMATION MANAGEMENT | Facility: CLINIC | Age: 74
End: 2019-06-28

## 2019-07-29 ENCOUNTER — TRANSFERRED RECORDS (OUTPATIENT)
Dept: HEALTH INFORMATION MANAGEMENT | Facility: CLINIC | Age: 74
End: 2019-07-29

## 2019-08-10 DIAGNOSIS — E78.00 PURE HYPERCHOLESTEROLEMIA: ICD-10-CM

## 2019-08-10 DIAGNOSIS — N40.0 BENIGN NON-NODULAR PROSTATIC HYPERPLASIA WITHOUT LOWER URINARY TRACT SYMPTOMS: ICD-10-CM

## 2019-08-10 DIAGNOSIS — E11.9 CONTROLLED TYPE 2 DIABETES MELLITUS WITHOUT COMPLICATION, WITHOUT LONG-TERM CURRENT USE OF INSULIN (H): Chronic | ICD-10-CM

## 2019-08-12 RX ORDER — ATORVASTATIN CALCIUM 10 MG/1
10 TABLET, FILM COATED ORAL DAILY
Qty: 30 TABLET | Refills: 11 | OUTPATIENT
Start: 2019-08-12

## 2019-08-12 RX ORDER — TAMSULOSIN HYDROCHLORIDE 0.4 MG/1
0.4 CAPSULE ORAL DAILY
Qty: 90 CAPSULE | Refills: 3 | OUTPATIENT
Start: 2019-08-12

## 2019-08-12 RX ORDER — METFORMIN HCL 500 MG
TABLET, EXTENDED RELEASE 24 HR ORAL
Qty: 60 TABLET | Refills: 11 | OUTPATIENT
Start: 2019-08-12

## 2019-08-12 NOTE — TELEPHONE ENCOUNTER
Multiple refills noted at patients pharmacy for atorvastatin, metformin, and Flomax.     Pharmacy was called, message left that there are refills noted to be at the pharmacy for all three of these medications.     Denied at this time. Duplicate.

## 2019-08-12 NOTE — TELEPHONE ENCOUNTER
"Requested Prescriptions   Pending Prescriptions Disp Refills     atorvastatin (LIPITOR) 10 MG tablet [Pharmacy Med Name: ATORVASTATIN 10 MG TABLET 10 TAB]  Last Written Prescription Date:  5/3/2019  Last Fill Quantity: 30 tablet,  # refills: 11   Last office visit: 5/10/2019 with prescribing provider:  ROBERT Hernandez   Future Office Visit:   Next 5 appointments (look out 90 days)    Aug 13, 2019  8:00 AM CDT  Office Visit with Darrin Hernandez MD, [facility]  [facility] [address] [address] [phone]          30 tablet 11     Sig: Take 1 tablet (10 mg) by mouth daily       Statins Protocol Failed - 8/10/2019  3:04 PM        Failed - LDL on file in past 12 months     Recent Labs   Lab Test 07/30/18  0820   LDL 80             Passed - No abnormal creatine kinase in past 12 months     No lab results found.             Passed - Recent (12 mo) or future (30 days) visit within the authorizing provider's specialty     Patient had office visit in the last 12 months or has a visit in the next 30 days with authorizing provider or within the authorizing provider's specialty.  See \"Patient Info\" tab in inbasket, or \"Choose Columns\" in Meds & Orders section of the refill encounter.              Passed - Medication is active on med list        Passed - Patient is age 18 or older        tamsulosin (FLOMAX) 0.4 MG capsule [Pharmacy Med Name: TAMSULOSIN HCL 0.4 MG CAPS 0.4 CAP]  Last Written Prescription Date:  5/3/2019  Last Fill Quantity: 90 capsule,  # refills: 3   Last office visit: 5/10/2019 with prescribing provider:  ROBERT Hernandez   Future Office Visit:   Next 5 appointments (look out 90 days)    Aug 13, 2019  8:00 AM CDT  Office Visit with Darrin Hernandez MD, [employer] [facility] [employer] [facility]  [facility] ([facility]" [place] [address]  [facility] [address]  [phone]          90 capsule 3     Sig: Take 1 capsule (0.4 mg) by mouth daily       Alpha Blockers Passed - 8/10/2019  3:04 PM        Passed - Blood pressure under 140/90 in past 12 months     BP Readings from Last 3 Encounters:   05/10/19 124/76   05/03/19 132/74   11/13/18 114/60                 Passed - Recent (12 mo) or future (30 days) visit within the authorizing provider's specialty     Patient had office visit in the last 12 months or has a visit in the next 30 days with authorizing provider or within the authorizing provider's specialty.  See \"Patient Info\" tab in inbasket, or \"Choose Columns\" in Meds & Orders section of the refill encounter.              Passed - Patient does not have Tadalafil, Vardenafil, or Sildenafil on their medication list        Passed - Medication is active on med list        Passed - Patient is 18 years of age or older        metFORMIN (GLUCOPHAGE-XR) 500 MG 24 hr tablet [Pharmacy Med Name: METFORMIN HCL  MG  TAB]  Last Written Prescription Date:  5/3/2019  Last Fill Quantity: 60 tablet,  # refills: 11   Last office visit: 5/10/2019 with prescribing provider:  ROBERT Hernandez   Future Office Visit:   Next 5 appointments (look out 90 days)    Aug 13, 2019  8:00 AM CDT  Office Visit with Darrin Hernandez MD, [facility] [employer] [facility]  [facility] [address]  [facility] [address]  [phone]          60 tablet 11     Sig: TAKE 1 TABLET BY MOUTH TWICE DAILY WITH MEALS       Biguanide Agents Failed - 8/10/2019  3:04 PM        Failed - Patient has documented LDL within the past 12 mos.     Recent Labs   Lab Test 07/30/18  0820   LDL 80             Failed - Patient's CR is NOT>1.4 OR Patient's EGFR is NOT<45 within past 12 mos.     Recent Labs   Lab Test 07/30/18  0820   GFRESTIMATED 77 " "  GFRESTBLACK >90       Recent Labs   Lab Test 07/30/18  0820   CR 0.96             Passed - Blood pressure less than 140/90 in past 6 months     BP Readings from Last 3 Encounters:   05/10/19 124/76   05/03/19 132/74   11/13/18 114/60                 Passed - Patient has had a Microalbumin in the past 15 mos.     Recent Labs   Lab Test 07/30/18  0825   MICROL 11   UMALCR 4.82             Passed - Patient is age 10 or older        Passed - Patient has documented A1c within the specified period of time.     If HgbA1C is 8 or greater, it needs to be on file within the past 3 months.  If less than 8, must be on file within the past 6 months.     Recent Labs   Lab Test 05/03/19  1514   A1C 7.1*             Passed - Patient does NOT have a diagnosis of CHF.        Passed - Medication is active on med list        Passed - Recent (6 mo) or future (30 days) visit within the authorizing provider's specialty     Patient had office visit in the last 6 months or has a visit in the next 30 days with authorizing provider or within the authorizing provider's specialty.  See \"Patient Info\" tab in inbasket, or \"Choose Columns\" in Meds & Orders section of the refill encounter.               "

## 2019-08-13 ENCOUNTER — OFFICE VISIT (OUTPATIENT)
Dept: FAMILY MEDICINE | Facility: CLINIC | Age: 74
End: 2019-08-13
Payer: COMMERCIAL

## 2019-08-13 VITALS
BODY MASS INDEX: 23.43 KG/M2 | WEIGHT: 180 LBS | TEMPERATURE: 96.2 F | SYSTOLIC BLOOD PRESSURE: 120 MMHG | RESPIRATION RATE: 16 BRPM | HEART RATE: 55 BPM | OXYGEN SATURATION: 99 % | DIASTOLIC BLOOD PRESSURE: 68 MMHG

## 2019-08-13 DIAGNOSIS — E11.9 TYPE 2 DIABETES, HBA1C GOAL < 8% (H): ICD-10-CM

## 2019-08-13 DIAGNOSIS — E78.5 HYPERLIPIDEMIA WITH TARGET LDL LESS THAN 100: Chronic | ICD-10-CM

## 2019-08-13 DIAGNOSIS — I10 ESSENTIAL HYPERTENSION WITH GOAL BLOOD PRESSURE LESS THAN 130/80: Chronic | ICD-10-CM

## 2019-08-13 DIAGNOSIS — M79.672 FOOT PAIN, LEFT: ICD-10-CM

## 2019-08-13 DIAGNOSIS — K21.00 GASTROESOPHAGEAL REFLUX DISEASE WITH ESOPHAGITIS: Primary | ICD-10-CM

## 2019-08-13 LAB
ALT SERPL W P-5'-P-CCNC: 18 U/L (ref 0–70)
ANION GAP SERPL CALCULATED.3IONS-SCNC: 8 MMOL/L (ref 3–14)
BUN SERPL-MCNC: 13 MG/DL (ref 7–30)
CALCIUM SERPL-MCNC: 9.1 MG/DL (ref 8.5–10.1)
CHLORIDE SERPL-SCNC: 107 MMOL/L (ref 94–109)
CHOLEST SERPL-MCNC: 144 MG/DL
CO2 SERPL-SCNC: 25 MMOL/L (ref 20–32)
CREAT SERPL-MCNC: 0.9 MG/DL (ref 0.66–1.25)
GFR SERPL CREATININE-BSD FRML MDRD: 83 ML/MIN/{1.73_M2}
GLUCOSE SERPL-MCNC: 132 MG/DL (ref 70–99)
HBA1C MFR BLD: 6.3 % (ref 0–5.6)
HDLC SERPL-MCNC: 35 MG/DL
LDLC SERPL CALC-MCNC: 77 MG/DL
NONHDLC SERPL-MCNC: 109 MG/DL
POTASSIUM SERPL-SCNC: 4 MMOL/L (ref 3.4–5.3)
SODIUM SERPL-SCNC: 140 MMOL/L (ref 133–144)
TRIGL SERPL-MCNC: 159 MG/DL

## 2019-08-13 PROCEDURE — 82043 UR ALBUMIN QUANTITATIVE: CPT | Performed by: FAMILY MEDICINE

## 2019-08-13 PROCEDURE — 80061 LIPID PANEL: CPT | Performed by: FAMILY MEDICINE

## 2019-08-13 PROCEDURE — 99214 OFFICE O/P EST MOD 30 MIN: CPT | Performed by: FAMILY MEDICINE

## 2019-08-13 PROCEDURE — 80048 BASIC METABOLIC PNL TOTAL CA: CPT | Performed by: FAMILY MEDICINE

## 2019-08-13 PROCEDURE — 84460 ALANINE AMINO (ALT) (SGPT): CPT | Performed by: FAMILY MEDICINE

## 2019-08-13 PROCEDURE — 36415 COLL VENOUS BLD VENIPUNCTURE: CPT | Performed by: FAMILY MEDICINE

## 2019-08-13 PROCEDURE — 83036 HEMOGLOBIN GLYCOSYLATED A1C: CPT | Performed by: FAMILY MEDICINE

## 2019-08-13 RX ORDER — ACETAMINOPHEN 500 MG
500-1000 TABLET ORAL EVERY 6 HOURS PRN
Qty: 120 TABLET | Refills: 11 | Status: SHIPPED | OUTPATIENT
Start: 2019-08-13

## 2019-08-13 RX ORDER — ASPIRIN 81 MG/1
81 TABLET, CHEWABLE ORAL DAILY
Qty: 108 TABLET | Refills: 3 | Status: SHIPPED | OUTPATIENT
Start: 2019-08-13 | End: 2020-01-07

## 2019-08-13 NOTE — PATIENT INSTRUCTIONS
(K21.0) Gastroesophageal reflux disease with esophagitis  (primary encounter diagnosis)  Comment:    Plan: ranitidine (ZANTAC) 150 MG capsule             (I10) Essential hypertension with goal blood pressure less than 130/80  Comment:    Plan: aspirin (ASA) 81 MG chewable tablet, Basic         metabolic panel, Albumin Random Urine         Quantitative with Creat Ratio             (E78.5) Hyperlipidemia with target LDL less than 100  Comment:    Plan: aspirin (ASA) 81 MG chewable tablet, Lipid         panel reflex to direct LDL Fasting, ALT             (M79.672) Foot pain, left  Comment:    Plan: acetaminophen (TYLENOL) 500 MG tablet             (E11.9) Type 2 diabetes, HbA1C goal < 8% (H)  Comment:    Plan: Basic metabolic panel, Albumin Random Urine         Quantitative with Creat Ratio, Hemoglobin A1c

## 2019-08-13 NOTE — PROGRESS NOTES
Subjective     Chivo Urias is a 73 year old male who presents to clinic today for the following health issues:    HPI   Diabetes Follow-up      How often are you checking your blood sugar? One time daily    What time of day are you checking your blood sugars (select all that apply)?  Before meals    Have you had any blood sugars above 200?  No    Have you had any blood sugars below 70?  No    What symptoms do you notice when your blood sugar is low?  None    What concerns do you have today about your diabetes? Other: went for a walk yesterday, walked up a hill and felt pressure in chest and legs were sore. Thinks it may be related to cholesterol     Do you have any of these symptoms? (Select all that apply)  No numbness or tingling in feet.  No redness, sores or blisters on feet.  No complaints of excessive thirst.  No reports of blurry vision.  No significant changes to weight.     Have you had a diabetic eye exam in the last 12 months? Yes- Date of last eye exam: 9/2/2019    BP Readings from Last 2 Encounters:   05/10/19 124/76   05/03/19 132/74     Hemoglobin A1C (%)   Date Value   05/03/2019 7.1 (H)   11/13/2018 6.4 (H)     LDL Cholesterol Calculated (mg/dL)   Date Value   07/30/2018 80   09/29/2017 39       Diabetes Management Resources      How many servings of fruits and vegetables do you eat daily?  2-3    On average, how many sweetened beverages do you drink each day (soda, juice, sweet tea, etc)?   0    How many days per week do you miss taking your medication? 0        .EDUARDA LAMBERT MD .8/13/2019 8:54 AM .August 13, 2019        Chivo Urias is a 73 year old male who is who presents with FOLLOW UP  DIABETES     HYPERTENSION WELL CONTROLLED     DIABETES 2 GOAL 8% CONTROLLED A1 6.1 TODAY     WANTS TO STOP GLIMIPERIDE    Onset : MANY YEARS     Severity: MODERATE       Home treatments  ONGOING      Additional Symptoms:  NUMBNESS AND FEET     CHRONIC LOWER BACK PAIN      Course  ONGOING          SOME MUSCLE ACHES WITH ATORVASTATIN     TAKING VITAMIN D  VITAMIN D REPLACEMENT      GLUCOSE IS LOW WANTS OFF GLIMPERIDE     BENIGN PROSTATIC HYPERTROPHY SYMPTOMS ON FLOMAX AND FINASTERIDE     SEASONAL ALLERGIC RHINITIS SYMPTOMS OCCASIONAL USES CETIRIZINE     HYPERTENSION WITH GOAL OF LESS THAN 140/80 CONTROLLED LISINOPRIL     OCCASIONAL GASTROESOPHAGEAL REFLUX DISEASE WITHOUT ESOPHAGITIS WANTS  PRN ZANTAC 150MG PO TWICE DAILY     REFILLED TYLENOL AND ASPIRIN     HOLD ASPIRIN IF HAVING EPIGASTRIC PAIN                There are no preventive care reminders to display for this patient.          .  Current Outpatient Medications   Medication Sig Dispense Refill     acetaminophen (TYLENOL) 500 MG tablet Take 1-2 tablets (500-1,000 mg) by mouth every 6 hours as needed 120 tablet 11     aspirin (ASA) 81 MG chewable tablet Take 1 tablet (81 mg) by mouth daily 108 tablet 3     atorvastatin (LIPITOR) 10 MG tablet Take 1 tablet (10 mg) by mouth daily 30 tablet 11     blood glucose (MIKHAIL CONTOUR NEXT) test strip 1 strip by In Vitro route daily 100 each 11     blood glucose monitoring (Apliiq CONTOUR MONITOR) meter device kit Use to test blood sugars TWICE DAILY  times daily or as directed. 1 kit 0     blood glucose monitoring (MIKHAIL MICROLET) lancets 1 each 2 times daily 100 each 11     Blood Pressure Monitoring (WRIST BLOOD PRESSURE MONITOR) MISC 1 each 2 times daily as needed 1 each 0     cetirizine (ZYRTEC) 10 MG tablet Take 1 tablet (10 mg) by mouth every evening 30 tablet 3     cholecalciferol (VITAMIN D-1000 MAX ST) 1000 units TABS Take 3 tablets by mouth daily 90 tablet 11     finasteride (PROSCAR) 5 MG tablet Take 1 tablet (5 mg) by mouth daily 90 tablet 3     gabapentin (NEURONTIN) 100 MG capsule TAKE 1 CAPSULE (100 MG) BY MOUTH 3 TIMES DAILY 90 capsule 1     glimepiride (AMARYL) 1 MG tablet Take 1 tablet (1 mg) by mouth every morning (before breakfast) 30 tablet 11     lidocaine (LIDODERM) 5 % patch On am off  hour of sleep 30 patch 3     lisinopril (PRINIVIL/ZESTRIL) 2.5 MG tablet Take 1 tablet (2.5 mg) by mouth daily 30 tablet 11     metFORMIN (GLUCOPHAGE-XR) 500 MG 24 hr tablet TAKE 1 TABLET BY MOUTH TWICE DAILY WITH MEALS 60 tablet 11     mineral oil-white petrolatum (MINERIN/EUCERIN) CREA cream Apply topically 2 times daily 30 g 11     montelukast (SINGULAIR) 10 MG tablet Take 1 tablet (10 mg) by mouth daily 30 tablet 11     ranitidine (ZANTAC) 150 MG capsule Take 1 capsule (150 mg) by mouth 2 times daily 60 capsule 11     senna-docusate (SENOKOT-S/PERICOLACE) 8.6-50 MG tablet Take 2 tablets by mouth 2 times daily 60 tablet 1     tamsulosin (FLOMAX) 0.4 MG capsule Take 1 capsule (0.4 mg) by mouth daily 90 capsule 3              No Known Allergies      Immunization History   Administered Date(s) Administered     Pneumococcal 23 valent 03/05/2013     Tetanus 01/01/2010               reports that he does not drink alcohol.          reports that he does not use drugs.        family history includes Family History Negative in his father and mother.        indicated that his mother is alive. He indicated that his father is alive.             has no past surgical history on file.         reports that he does not currently engage in sexual activity.    .  Pediatric History   Patient Guardian Status     Not on file     Other Topics Concern     Parent/sibling w/ CABG, MI or angioplasty before 65F 55M? No   Social History Narrative    Surgical History  Return To Top     Status Surgery Time Frame Comment Record Date     Inactive  NONE      11/14/2007          --------------------------------------------------------------------------------    Food Allergy  Return To Top     Allergen Reaction Comment Record Date     * No known food allergies      11/14/2007          --------------------------------------------------------------------------------    Drug Allergy  Return To Top     Allergen Reaction Comment Record Date     * No  known drug allergies      5/15/2007          --------------------------------------------------------------------------------    Environment Allergy  Return To Top     Allergen Reaction Comment Record Date     * No known environmental allergies      11/14/2007          --------------------------------------------------------------------------------    Social History  Return To Top     Question Answer Comment Record Date     Marital status      11/14/2007      Advance Directive or Living Will  No    5/18/2010      Emotional Abuse  No    7/1/2011      Exercise  Yes SOMETIMES  walks alot.  11/14/2007      Caffeine  Yes  Tea  1/11/2008      Physical Abuse  No    7/1/2011      Sealtbelts  Yes    11/14/2007      Sexual Abuse  No     7/1/2011      Breast/Testicle Self Check  Yes    11/14/2007      Number of children  7  4 GIRLS AND 3 BOYS  7/17/2007      Living arrangements  Apartment/Condo    11/14/2007      Number of children in household  0    11/14/2007      Number of adults in household  1    11/14/2007      Education level  High School Graduate    11/14/2007      Employment  Currently unemployed    11/14/2007      Tobacco history  Has never smoked or chewed tobacco    8/29/2008      Alcohol history  Never drinks alcohol    11/14/2007      Has the patient ever used illegal drugs?  Has never used illegal drugs    7/1/2011      Has the patient used marijuana?  No    7/1/2011      Has the patient used cocaine?  No    7/1/2011          --------------------------------------------------------------------------------    Medical History Return To Top     Status Diagnosis Time Frame Comment Record Date     Active (729.1) - C - Myalgia      8/11/2011      Active (726.79) - C - Sub-Achilles bursitis      8/11/2011      Active (700) - C - Corn/callus    o  7/1/2011      Active (272.4) - C - Hyperlipidemia      7/1/2011      Active (V81.2) - C - SCREEN-CARDIOVASC NEC      6/21/2011      Active (V82.9) - C - Screening,  vitamin d deficiency      4/8/2011      Active (V77.91) - C - Screening, lipids      4/8/2011      Active (U56.51) - C - Screening, colon      4/8/2011      Active (780.79) - C - Fatigue      5/18/2010      Active V76.44 Screening, prostate      10/7/2009      Active (780.79) - C - Fatigue      10/7/2009      Active 477.9 Rhinitis, allergic, cause unspecified      8/29/2008      Active (782.0) - C - Numbness    RIGHT LEG  6/19/2008      Active 780.79 Fatigue      2/20/2008      Active (784.0) - C - Headache, unspec.      2/1/2008      Active 724.3 Sciatica    chronic, with worsening weakness and sensory changes in S1 distribution  1/11/2008      Active 608.4 MALE GEN INFLAM DIS OT      11/20/2007      Active 608.9 MALE GENITAL DIS UNSPEC      11/14/2007      Active V78.3 Screening, HGB      11/14/2007      Active 355.9 MONONEURITIS UNSPEC      7/17/2007      Active 365.9 UNSPECIFIED GLAUCOMA      5/16/2007      Active (719.46) - C - Knee pain      5/16/2007      Active (729.5) - C - limb pain    both legs muscle aches  5/16/2007      Active (724.2) - C - Low back pain      5/16/2007      Inactive  (780.79) - C - Fatigue    IMPROVED   10/22/2009      Inactive  (780.79) - C - Fatigue    IMPROVED  6/19/2008      Inactive  782.0 Numbness      5/16/2007          ----------------------------------------------------        --------------------------------------------------------------------------------    Family History  Return To Top     Status Relationship Disease Comment Record Date     Alive Mother      11/14/2007      Alive Father      11/14/2007          --------------------------------------------------------------------------------                       reports that he has never smoked. He has never used smokeless tobacco.        Medical, social, surgical, and family histories reviewed.        Labs reviewed in EPIC  Patient Active Problem List   Diagnosis     Health Care Home     Myalgia and myositis     Allergic  rhinitis     Sciatica     Glaucoma     Foot pain, left     Controlled type 2 diabetes mellitus without complication (H)     Hyperlipidemia with target LDL less than 100     Vitamin D insufficiency     DDD (degenerative disc disease), lumbar     Stage 1 chronic renal impairment associated with type 2 diabetes mellitus (H)     Comprehensive diabetic foot examination, type 2 DM, encounter for (H)     Low HDL (under 40)     Hypertension goal BP (blood pressure) < 130/80     CKD (chronic kidney disease) stage 2, GFR 60-89 ml/min     Right hip pain     Type 2 diabetes, HbA1C goal < 8% (H)     ACP (advance care planning)     Enthesopathy of right hip region         History reviewed. No pertinent surgical history.    Social History     Tobacco Use     Smoking status: Never Smoker     Smokeless tobacco: Never Used   Substance Use Topics     Alcohol use: No       Family History   Problem Relation Age of Onset     Family History Negative Mother      Family History Negative Father              Current Outpatient Medications   Medication Sig Dispense Refill     acetaminophen (TYLENOL) 500 MG tablet Take 1-2 tablets (500-1,000 mg) by mouth every 6 hours as needed 120 tablet 11     aspirin (ASA) 81 MG chewable tablet Take 1 tablet (81 mg) by mouth daily 108 tablet 3     atorvastatin (LIPITOR) 10 MG tablet Take 1 tablet (10 mg) by mouth daily 30 tablet 11     blood glucose (MIKHAIL CONTOUR NEXT) test strip 1 strip by In Vitro route daily 100 each 11     blood glucose monitoring (MIKHAIL CONTOUR MONITOR) meter device kit Use to test blood sugars TWICE DAILY  times daily or as directed. 1 kit 0     blood glucose monitoring (MIKHAIL MICROLET) lancets 1 each 2 times daily 100 each 11     Blood Pressure Monitoring (WRIST BLOOD PRESSURE MONITOR) MISC 1 each 2 times daily as needed 1 each 0     cetirizine (ZYRTEC) 10 MG tablet Take 1 tablet (10 mg) by mouth every evening 30 tablet 3     cholecalciferol (VITAMIN D-1000 MAX ST) 1000 units TABS  Take 3 tablets by mouth daily 90 tablet 11     finasteride (PROSCAR) 5 MG tablet Take 1 tablet (5 mg) by mouth daily 90 tablet 3     gabapentin (NEURONTIN) 100 MG capsule TAKE 1 CAPSULE (100 MG) BY MOUTH 3 TIMES DAILY 90 capsule 1     glimepiride (AMARYL) 1 MG tablet Take 1 tablet (1 mg) by mouth every morning (before breakfast) 30 tablet 11     lidocaine (LIDODERM) 5 % patch On am off hour of sleep 30 patch 3     lisinopril (PRINIVIL/ZESTRIL) 2.5 MG tablet Take 1 tablet (2.5 mg) by mouth daily 30 tablet 11     metFORMIN (GLUCOPHAGE-XR) 500 MG 24 hr tablet TAKE 1 TABLET BY MOUTH TWICE DAILY WITH MEALS 60 tablet 11     mineral oil-white petrolatum (MINERIN/EUCERIN) CREA cream Apply topically 2 times daily 30 g 11     montelukast (SINGULAIR) 10 MG tablet Take 1 tablet (10 mg) by mouth daily 30 tablet 11     ranitidine (ZANTAC) 150 MG capsule Take 1 capsule (150 mg) by mouth 2 times daily 60 capsule 11     senna-docusate (SENOKOT-S/PERICOLACE) 8.6-50 MG tablet Take 2 tablets by mouth 2 times daily 60 tablet 1     tamsulosin (FLOMAX) 0.4 MG capsule Take 1 capsule (0.4 mg) by mouth daily 90 capsule 3           Recent Labs   Lab Test 05/03/19  1514 11/13/18  0927 07/30/18  0820 06/28/18  0823 09/29/17  0832 06/08/17  0808 12/08/16  0920 11/10/16  1028   A1C 7.1* 6.4* 6.6*  --  7.1* 7.6*  --  6.9*   LDL  --   --  80  --  39  --   --  62   HDL  --   --  40  --  40  --   --  42   TRIG  --   --  189*  --  306*  --   --  216*   ALT  --   --  22  --  24 23  --  32   CR  --   --  0.96 1.03 0.90 0.88  --  1.08   GFRESTIMATED  --   --  77 70 83 85  --  67   GFRESTBLACK  --   --  >90 85 >90 >90   GFR Calc    --  81   POTASSIUM  --   --  4.1 4.3 4.2 4.0  --  4.2   TSH  --  1.21  --   --   --   --  0.99  --             BP Readings from Last 6 Encounters:   08/13/19 120/68   05/10/19 124/76   05/03/19 132/74   11/13/18 114/60   09/17/18 106/64   08/20/18 112/64           Wt Readings from Last 3 Encounters:   08/13/19  "81.6 kg (180 lb)   05/10/19 82.6 kg (182 lb)   05/03/19 83.9 kg (185 lb)                 Positive symptoms or findings indicated by bold designation:         ROS: 10 point ROS neg other than the symptoms noted above in the HPI.except  has Health Care Home; Myalgia and myositis; Allergic rhinitis; Sciatica; Glaucoma; Foot pain, left; Controlled type 2 diabetes mellitus without complication (H); Hyperlipidemia with target LDL less than 100; Vitamin D insufficiency; DDD (degenerative disc disease), lumbar; Stage 1 chronic renal impairment associated with type 2 diabetes mellitus (H); Comprehensive diabetic foot examination, type 2 DM, encounter for (H); Low HDL (under 40); Hypertension goal BP (blood pressure) < 130/80; CKD (chronic kidney disease) stage 2, GFR 60-89 ml/min; Right hip pain; Type 2 diabetes, HbA1C goal < 8% (H); ACP (advance care planning); and Enthesopathy of right hip region on their problem list.  Review Of Systems    Skin: negative    Eyes: negative    Ears/Nose/Throat: negative    Respiratory: No shortness of breath, dyspnea on exertion, cough, or hemoptysis    Cardiovascular: negative    Gastrointestinal: heartburn    Genitourinary: negative    Musculoskeletal: back pain, arthritis and joint pain    Neurologic: negative    Psychiatric: negative    Hematologic/Lymphatic/Immunologic: negative    Endocrine: DIABETES 2 GOAL 8%  LDL OR \"BAD\" CHOLESTEROL  GOAL < 100                 PE:  /68   Pulse 55   Temp 96.2  F (35.7  C) (Tympanic)   Resp 16   Wt 81.6 kg (180 lb)   SpO2 99%   BMI 23.43 kg/m   Body mass index is 23.43 kg/m .        Constitutional: general appearance, well nourished, well developed, in no acute distress, well developed, appears stated age, normal body habitus,             Eyes:; The patient has normal eyelids sclerae and conjunctivae :          Ears/Nose/Throat: external ear, overall: normal appearance; external nose, overall: benign appearance, normal moujth gums and " lips            Neck: thyroid, overall: normal size, normal consistency, nontender,          Respiratory:  palpation of chest, overall: normal excursion,    Clear to percussion and auscultation     NO Tachypnea    NORMAL  Color          Cardiovascular:  Good color with no peripheral edema     Regular sinus rhythm without murmur.  Physiologic heart sounds   Heart is unelarged    .     Chest/Breast: normal shape           Abdominal exam,  Liver and spleen are  unenlarged        Tenderness    Scars              Urogenital; no renal, flank or bladder  tenderness;          Lymphatic: neck nodes,     Other nodes         Musculoskeletal:  Brief ortho exam normal except:   DECREASE RANGE OF MOTION OF BACK     MILD KNEE PAIN JOINT LINE TENDERNESS BILATERAL         Integument: inspection of skin, no rash, lesions; and, palpation, no induration, no tenderness.          Neurologic mental status, overall: alert and oriented; gait, no ataxia, no unsteadiness; coordination, no tremors; cranial nerves, overall: normal motor, overall: normal bulk, tone.          Psychiatric: orientation/consciousness, overall: oriented to person, place and time; behavior/psychomotor activity, no tics, normal psychomotor activity; mood and affect, overall: normal mood and affect; appearance, overall: well-groomed, good eye contact; speech, overall: normal quality, no aphasia and normal quality, quantity, intact.        Diagnostic Test Results:  Results for orders placed or performed in visit on 06/28/19   Eye Exam - HIM Scan    Narrative    [facility] EYE EXAM NOTE            ICD-10-CM    1. Gastroesophageal reflux disease with esophagitis K21.0 ranitidine (ZANTAC) 150 MG capsule   2. Essential hypertension with goal blood pressure less than 130/80 I10 aspirin (ASA) 81 MG chewable tablet     Basic metabolic panel     Albumin Random Urine Quantitative with Creat Ratio   3. Hyperlipidemia with target LDL less than 100 E78.5 aspirin (ASA) 81 MG  chewable tablet     Lipid panel reflex to direct LDL Fasting     ALT   4. Foot pain, left M79.672 acetaminophen (TYLENOL) 500 MG tablet   5. Type 2 diabetes, HbA1C goal < 8% (H) E11.9 Basic metabolic panel     Albumin Random Urine Quantitative with Creat Ratio     Hemoglobin A1c              .    Side effects benefits and risks thoroughly discussed. .he may come in early if unimproved or getting worse          Please drink 2 glasses of water prior to meals and walk 15-30 minutes after meals        I spent  25 MINUTES SPENT  with patient discussing the following issues    The primary encounter diagnosis was Gastroesophageal reflux disease with esophagitis. Diagnoses of Essential hypertension with goal blood pressure less than 130/80, Hyperlipidemia with target LDL less than 100, Foot pain, left, and Type 2 diabetes, HbA1C goal < 8% (H) were also pertinent to this visit. over half of which involved counseling and coordination of care.      Patient Instructions   (K21.0) Gastroesophageal reflux disease with esophagitis  (primary encounter diagnosis)  Comment:    Plan: ranitidine (ZANTAC) 150 MG capsule             (I10) Essential hypertension with goal blood pressure less than 130/80  Comment:    Plan: aspirin (ASA) 81 MG chewable tablet, Basic         metabolic panel, Albumin Random Urine         Quantitative with Creat Ratio             (E78.5) Hyperlipidemia with target LDL less than 100  Comment:    Plan: aspirin (ASA) 81 MG chewable tablet, Lipid         panel reflex to direct LDL Fasting, ALT             (M79.672) Foot pain, left  Comment:    Plan: acetaminophen (TYLENOL) 500 MG tablet             (E11.9) Type 2 diabetes, HbA1C goal < 8% (H)  Comment:    Plan: Basic metabolic panel, Albumin Random Urine         Quantitative with Creat Ratio, Hemoglobin A1c                             ALL THE ABOVE PROBLEMS ARE STABLE AND MED CHANGES AS NOTED        Diet: MEDITERRANEAN DIET AND DIABETES         Exercise:  KNEE  PAIN   Exercises Range of motion, balance, isometric, and strengthening exercises 30 repetitions twice daily of involved joints            .EDUARDA LAMBERT MD 8/13/2019 8:54 AM  August 13, 2019

## 2019-08-13 NOTE — LETTER
"August 16, 2019      Chivo Urias  [address]  [address]  [facility] [id number]        Dear ,    We are writing to inform you of your test results.      NORMAL LIVER FUNCTION TEST   NORMAL DIABETES URINE PROTEIN TEST   THREE MONTH GLUCOSE AVERAGE AT TARGET LEVEL     STOP GLIMIPERIDE AS WE DISCUSSED AT OFFICE VISIT     NORMAL TOTAL CHOLESTEROL   BORDERLINE  TRIGLYCERIDES   LOW HDL OR \"GOOD\" CHOLESTEROL   NORMAL LDL OR \"BAD\" CHOLESTEROL   NORMAL VERY LOW DENSITY CHOLESTEROL   NORMAL LIVER FUNCTION TEST   BORDERLINE  BLOOD SUGAR   REST OF , RENAL AND BLOOD SALTS  WITHIN NORMAL LIMITS   Resulted Orders   Basic metabolic panel   Result Value Ref Range    Sodium 140 133 - 144 mmol/L    Potassium 4.0 3.4 - 5.3 mmol/L    Chloride 107 94 - 109 mmol/L    Carbon Dioxide 25 20 - 32 mmol/L    Anion Gap 8 3 - 14 mmol/L    Glucose 132 (H) 70 - 99 mg/dL    Urea Nitrogen 13 7 - 30 mg/dL    Creatinine 0.90 0.66 - 1.25 mg/dL    GFR Estimate 83 >60 mL/min/[1.73_m2]      Comment:      Non  GFR Calc  Starting 12/18/2018, serum creatinine based estimated GFR (eGFR) will be   calculated using the Chronic Kidney Disease Epidemiology Collaboration   (CKD-EPI) equation.      GFR Estimate If Black >90 >60 mL/min/[1.73_m2]      Comment:       GFR Calc  Starting 12/18/2018, serum creatinine based estimated GFR (eGFR) will be   calculated using the Chronic Kidney Disease Epidemiology Collaboration   (CKD-EPI) equation.      Calcium 9.1 8.5 - 10.1 mg/dL   Albumin Random Urine Quantitative with Creat Ratio   Result Value Ref Range    Creatinine Urine 97 mg/dL    Albumin Urine mg/L <5 mg/L    Albumin Urine mg/g Cr Unable to calculate due to low value 0 - 17 mg/g Cr   Lipid panel reflex to direct LDL Fasting   Result Value Ref Range    Cholesterol 144 <200 mg/dL    Triglycerides 159 (H) <150 mg/dL      Comment:      Borderline high:  150-199 mg/dl  High:             200-499 mg/dl  Very " high:       >499 mg/dl      HDL Cholesterol 35 (L) >39 mg/dL    LDL Cholesterol Calculated 77 <100 mg/dL      Comment:      Desirable:       <100 mg/dl    Non HDL Cholesterol 109 <130 mg/dL   Hemoglobin A1c   Result Value Ref Range    Hemoglobin A1C 6.3 (H) 0 - 5.6 %      Comment:      Normal <5.7% Prediabetes 5.7-6.4%  Diabetes 6.5% or higher - adopted from ADA   consensus guidelines.     ALT   Result Value Ref Range    ALT 18 0 - 70 U/L       If you have any questions or concerns, please call the clinic at the number listed above.       Sincerely,        EDUARDA LAMBERT MD

## 2019-08-14 LAB
CREAT UR-MCNC: 97 MG/DL
MICROALBUMIN UR-MCNC: <5 MG/L
MICROALBUMIN/CREAT UR: NORMAL MG/G CR (ref 0–17)

## 2019-12-23 ENCOUNTER — PATIENT OUTREACH (OUTPATIENT)
Dept: GERIATRIC MEDICINE | Facility: CLINIC | Age: 74
End: 2019-12-23

## 2019-12-23 NOTE — PROGRESS NOTES
[facility] [place] Care Coordination Contact    Called client to schedule annual home visit but call went straight to . Called daughter Aide Adler and she reported that [place] is currently [place] of [place]. Daughter stated client is expected to return sometime in January.    Laura Romo RN BSN PHN  [facility] [place] Care Coordinator  [phone]  Fax:[phone]

## 2020-01-07 ENCOUNTER — OFFICE VISIT (OUTPATIENT)
Dept: FAMILY MEDICINE | Facility: CLINIC | Age: 75
End: 2020-01-07
Payer: COMMERCIAL

## 2020-01-07 VITALS
OXYGEN SATURATION: 97 % | RESPIRATION RATE: 16 BRPM | WEIGHT: 182 LBS | DIASTOLIC BLOOD PRESSURE: 80 MMHG | BODY MASS INDEX: 23.36 KG/M2 | SYSTOLIC BLOOD PRESSURE: 120 MMHG | HEART RATE: 69 BPM | HEIGHT: 74 IN

## 2020-01-07 DIAGNOSIS — E78.5 HYPERLIPIDEMIA WITH TARGET LDL LESS THAN 100: Chronic | ICD-10-CM

## 2020-01-07 DIAGNOSIS — Z00.00 ENCOUNTER FOR MEDICARE ANNUAL WELLNESS EXAM: ICD-10-CM

## 2020-01-07 DIAGNOSIS — N40.0 BENIGN NON-NODULAR PROSTATIC HYPERPLASIA WITHOUT LOWER URINARY TRACT SYMPTOMS: ICD-10-CM

## 2020-01-07 DIAGNOSIS — I10 ESSENTIAL HYPERTENSION WITH GOAL BLOOD PRESSURE LESS THAN 130/80: Chronic | ICD-10-CM

## 2020-01-07 DIAGNOSIS — N18.1 TYPE 2 DIABETES MELLITUS WITH STAGE 1 CHRONIC KIDNEY DISEASE, WITHOUT LONG-TERM CURRENT USE OF INSULIN (H): Chronic | ICD-10-CM

## 2020-01-07 DIAGNOSIS — I10 HYPERTENSION GOAL BP (BLOOD PRESSURE) < 130/80: Chronic | ICD-10-CM

## 2020-01-07 DIAGNOSIS — E78.5 HYPERLIPIDEMIA LDL GOAL <100: ICD-10-CM

## 2020-01-07 DIAGNOSIS — E11.9 CONTROLLED TYPE 2 DIABETES MELLITUS WITHOUT COMPLICATION, WITHOUT LONG-TERM CURRENT USE OF INSULIN (H): Chronic | ICD-10-CM

## 2020-01-07 DIAGNOSIS — Z12.5 SCREENING PSA (PROSTATE SPECIFIC ANTIGEN): ICD-10-CM

## 2020-01-07 DIAGNOSIS — E11.22 TYPE 2 DIABETES MELLITUS WITH STAGE 1 CHRONIC KIDNEY DISEASE, WITHOUT LONG-TERM CURRENT USE OF INSULIN (H): Chronic | ICD-10-CM

## 2020-01-07 DIAGNOSIS — Z00.00 WELL ADULT EXAM: Primary | ICD-10-CM

## 2020-01-07 DIAGNOSIS — Z23 NEEDS FLU SHOT: ICD-10-CM

## 2020-01-07 DIAGNOSIS — F51.01 PRIMARY INSOMNIA: ICD-10-CM

## 2020-01-07 LAB
ALBUMIN UR-MCNC: NEGATIVE MG/DL
APPEARANCE UR: CLEAR
BILIRUB UR QL STRIP: NEGATIVE
COLOR UR AUTO: YELLOW
GLUCOSE UR STRIP-MCNC: NEGATIVE MG/DL
HBA1C MFR BLD: 6.6 % (ref 0–5.6)
HGB UR QL STRIP: NEGATIVE
KETONES UR STRIP-MCNC: NEGATIVE MG/DL
LEUKOCYTE ESTERASE UR QL STRIP: NEGATIVE
NITRATE UR QL: NEGATIVE
PH UR STRIP: 5.5 PH (ref 5–7)
SOURCE: NORMAL
SP GR UR STRIP: 1.02 (ref 1–1.03)
UROBILINOGEN UR STRIP-ACNC: 0.2 EU/DL (ref 0.2–1)

## 2020-01-07 PROCEDURE — 84460 ALANINE AMINO (ALT) (SGPT): CPT | Performed by: FAMILY MEDICINE

## 2020-01-07 PROCEDURE — 80061 LIPID PANEL: CPT | Performed by: FAMILY MEDICINE

## 2020-01-07 PROCEDURE — 99397 PER PM REEVAL EST PAT 65+ YR: CPT | Performed by: FAMILY MEDICINE

## 2020-01-07 PROCEDURE — 80048 BASIC METABOLIC PNL TOTAL CA: CPT | Performed by: FAMILY MEDICINE

## 2020-01-07 PROCEDURE — 83036 HEMOGLOBIN GLYCOSYLATED A1C: CPT | Performed by: FAMILY MEDICINE

## 2020-01-07 PROCEDURE — G0103 PSA SCREENING: HCPCS | Performed by: FAMILY MEDICINE

## 2020-01-07 PROCEDURE — 82043 UR ALBUMIN QUANTITATIVE: CPT | Performed by: FAMILY MEDICINE

## 2020-01-07 PROCEDURE — 81003 URINALYSIS AUTO W/O SCOPE: CPT | Performed by: FAMILY MEDICINE

## 2020-01-07 PROCEDURE — 36415 COLL VENOUS BLD VENIPUNCTURE: CPT | Performed by: FAMILY MEDICINE

## 2020-01-07 PROCEDURE — 99207 ZZC FOOT EXAM  NO CHARGE: CPT | Mod: 25 | Performed by: FAMILY MEDICINE

## 2020-01-07 RX ORDER — GLIMEPIRIDE 1 MG/1
1 TABLET ORAL
Qty: 30 TABLET | Refills: 11 | Status: SHIPPED | OUTPATIENT
Start: 2020-01-07 | End: 2020-01-07

## 2020-01-07 RX ORDER — ASPIRIN 81 MG/1
81 TABLET, CHEWABLE ORAL DAILY
Qty: 108 TABLET | Refills: 3 | Status: SHIPPED | OUTPATIENT
Start: 2020-01-07

## 2020-01-07 RX ORDER — TAMSULOSIN HYDROCHLORIDE 0.4 MG/1
0.4 CAPSULE ORAL DAILY
Qty: 90 CAPSULE | Refills: 3 | Status: SHIPPED | OUTPATIENT
Start: 2020-01-07 | End: 2020-06-02

## 2020-01-07 RX ORDER — FINASTERIDE 5 MG/1
5 TABLET, FILM COATED ORAL DAILY
Qty: 90 TABLET | Refills: 3 | Status: SHIPPED | OUTPATIENT
Start: 2020-01-07 | End: 2020-06-02

## 2020-01-07 RX ORDER — LISINOPRIL 2.5 MG/1
2.5 TABLET ORAL DAILY
Qty: 30 TABLET | Refills: 11 | Status: SHIPPED | OUTPATIENT
Start: 2020-01-07 | End: 2020-05-18

## 2020-01-07 RX ORDER — LANOLIN ALCOHOL/MO/W.PET/CERES
3 CREAM (GRAM) TOPICAL
Qty: 90 TABLET | Refills: 3 | Status: SHIPPED | OUTPATIENT
Start: 2020-01-07

## 2020-01-07 ASSESSMENT — ACTIVITIES OF DAILY LIVING (ADL)
CURRENT_FUNCTION: HOUSEWORK REQUIRES ASSISTANCE
CURRENT_FUNCTION: LAUNDRY REQUIRES ASSISTANCE

## 2020-01-07 ASSESSMENT — MIFFLIN-ST. JEOR: SCORE: 1629.27

## 2020-01-07 NOTE — PATIENT INSTRUCTIONS
1945  URINE DRUG SCREEN      12/01/1954  DTAP/TDAP/TD IMMUNIZATION (1 - Tdap)      12/01/1993  ZOSTER IMMUNIZATION (1 of 2)      01/08/2014  MEDICARE ANNUAL WELLNESS VISIT      03/05/2014  PNEUMOCOCCAL IMMUNIZATION 65+ LOW/MEDIUM RISK (2 of 2 - PCV13)      09/01/2019  INFLUENZA VACCINE (1)      09/17/2019  FALL RISK ASSESSMENT      11/13/2019  DIABETIC FOOT EXAM      01/01/2020  PHQ-2      Patient Active Problem List   Diagnosis     Health Care Home     Myalgia and myositis     Allergic rhinitis     Sciatica     Glaucoma     Foot pain, left     Controlled type 2 diabetes mellitus without complication (H)     Hyperlipidemia with target LDL less than 100     Vitamin D insufficiency     DDD (degenerative disc disease), lumbar     Stage 1 chronic renal impairment associated with type 2 diabetes mellitus (H)     Comprehensive diabetic foot examination, type 2 DM, encounter for (H)     Low HDL (under 40)     Hypertension goal BP (blood pressure) < 130/80     CKD (chronic kidney disease) stage 2, GFR 60-89 ml/min     Right hip pain     Type 2 diabetes, HbA1C goal < 8% (H)     ACP (advance care planning)     Enthesopathy of right hip region     Current Outpatient Medications   Medication     acetaminophen (TYLENOL) 500 MG tablet     aspirin (ASA) 81 MG chewable tablet     atorvastatin (LIPITOR) 10 MG tablet     blood glucose (MIKHAIL CONTOUR NEXT) test strip     blood glucose monitoring (MIKHAIL CONTOUR MONITOR) meter device kit     blood glucose monitoring (MIKHAIL MICROLET) lancets     Blood Pressure Monitoring (WRIST BLOOD PRESSURE MONITOR) MISC     cholecalciferol (VITAMIN D-1000 MAX ST) 1000 units TABS     finasteride (PROSCAR) 5 MG tablet     gabapentin (NEURONTIN) 100 MG capsule     metFORMIN (GLUCOPHAGE-XR) 500 MG 24 hr tablet     mineral oil-white petrolatum (MINERIN/EUCERIN) CREA cream     ranitidine (ZANTAC) 150 MG capsule     cetirizine (ZYRTEC) 10 MG tablet     glimepiride (AMARYL) 1 MG tablet     lidocaine  (LIDODERM) 5 % patch     lisinopril (PRINIVIL/ZESTRIL) 2.5 MG tablet     montelukast (SINGULAIR) 10 MG tablet     senna-docusate (SENOKOT-S/PERICOLACE) 8.6-50 MG tablet     tamsulosin (FLOMAX) 0.4 MG capsule     No current facility-administered medications for this visit.      (Z00.00) Well adult exam  (primary encounter diagnosis)  Comment:    Plan: UA reflex to Microscopic and Culture             (Z12.5) Screening PSA (prostate specific antigen)  Comment:    Plan: Prostate spec antigen screen             (E11.9) Controlled type 2 diabetes mellitus without complication, without long-term current use of insulin (H)  Comment:    Plan: DISCONTINUED: glimepiride (AMARYL) 1 MG tablet             (E11.22,  N18.1) Type 2 diabetes mellitus with stage 1 chronic kidney disease, without long-term current use of insulin (H)  Comment:    Plan: Albumin Random Urine Quantitative with Creat         Ratio, Hemoglobin A1c, lisinopril         (PRINIVIL/ZESTRIL) 2.5 MG tablet             (I10) Hypertension goal BP (blood pressure) < 130/80  Comment:    Plan: Basic metabolic panel             (E78.5) Hyperlipidemia LDL goal <100  Comment:    Plan: Lipid panel reflex to direct LDL Fasting, ALT             (F51.01) Primary insomnia  Comment:    Plan: melatonin 3 MG tablet             (N40.0) Benign non-nodular prostatic hyperplasia without lower urinary tract symptoms  Comment:    Plan: finasteride (PROSCAR) 5 MG tablet, tamsulosin         (FLOMAX) 0.4 MG capsule             (I10) Essential hypertension with goal blood pressure less than 130/80  Comment:    Plan: aspirin (ASA) 81 MG chewable tablet             (E78.5) Hyperlipidemia with target LDL less than 100  Comment:    Plan: aspirin (ASA) 81 MG chewable tablet                    Patient Education   Personalized Prevention Plan  You are due for the preventive services outlined below.  Your care team is available to assist you in scheduling these services.  If you have already  completed any of these items, please share that information with your care team to update in your medical record.  Health Maintenance Due   Topic Date Due     URINE DRUG SCREEN  1945     Diptheria Tetanus Pertussis (DTAP/TDAP/TD) Vaccine (1 - Tdap) 12/01/1954     Zoster (Shingles) Vaccine (1 of 2) 12/01/1993     Annual Wellness Visit  01/08/2014     Pneumococcal Vaccine (2 of 2 - PCV13) 03/05/2014     Flu Vaccine (1) 09/01/2019     FALL RISK ASSESSMENT  09/17/2019     Diabetic Foot Exam  11/13/2019     PHQ-2  01/01/2020       Understanding [employer] MyPlate  The USDA (U.S. Department of Agriculture) has guidelines to help you make healthy food choices. These are called MyPlate. MyPlate shows the food groups that make up healthy meals using the image of a place setting. Before you eat, think about the healthiest choices for what to put onto your plate or into your cup or bowl. To learn more about building a healthy plate, visit www.choosemyplate.gov.    The food groups    Fruits. Any fruit or 100% fruit juice counts as part of the Fruit Group. Fruits may be fresh, canned, frozen, or dried, and may be whole, cut-up, or pureed. Make half your plate fruits and vegetables.    Vegetables. Any vegetable or 100% vegetable juice counts as a member of the Vegetable Group. Vegetables may be fresh, frozen, canned, or dried. They can be served raw or cooked and may be whole, cut-up, or mashed. Make half your plate fruits and vegetables.    Grains. All foods made from grains are part of the Grains Group. These include wheat, rice, oats, cornmeal, and barley such as bread, pasta, oatmeal, cereal, tortillas, and grits. Grains should be no more than a quarter of your plate. At least half of your grains should be whole grains.    Protein. This group includes meat, poultry, seafood, beans and peas, eggs, processed soy products (like tofu), nuts (including nut butters), and seeds. Make protein choices no more than a quarter of your  plate. Meat and poultry choices should be lean or low fat.    Dairy. All fluid milk products and foods made from milk that contain calcium, like yogurt and cheese, are part of the Dairy Group. (Foods that have little calcium, such as cream, butter, and cream cheese, are not part of the group.) Most dairy choices should be low-fat or fat-free.    Oils. These are fats that are liquid at room temperature. They include canola, corn, olive, soybean, and sunflower oil. Foods that are mainly oil include mayonnaise, certain salad dressings, and soft margarines. You should have only 5 to 7 teaspoons of oils a day. You probably already get this much from the food you eat.  Date Last Reviewed: 8/1/2017 2000-2019 [employer]. [address] [place] [address], [place] [id number]. All rights reserved. This information is not intended as a substitute for professional medical care. Always follow your healthcare professional's instructions.        Activities of Daily Living    Your Health Risk Assessment indicates you have difficulties with activities of daily living such as housework, bathing, preparing meals, taking medication, etc. Please make a follow up appointment for us to address this issue in more detail.

## 2020-01-07 NOTE — PROGRESS NOTES
"SUBJECTIVE:   Chivo Urias is a 74 year old male who presents for Preventive Visit.    Are you in the first 12 months of your Medicare coverage?  No    Healthy Habits:     In general, how would you rate your overall health?  Good    Frequency of exercise:  2-3 days/week    Duration of exercise:  15-30 minutes    Do you usually eat at least 4 servings of fruit and vegetables a day, include whole grains    & fiber and avoid regularly eating high fat or \"junk\" foods?  No    Taking medications regularly:  Yes    Barriers to taking medications:  None    Medication side effects:  None    Ability to successfully perform activities of daily living:  Laundry requires assistance and housework requires assistance    Home Safety:  No safety concerns identified    Hearing Impairment:  No hearing concerns    In the past 6 months, have you been bothered by leaking of urine?  No    In general, how would you rate your overall mental or emotional health?  Good      PHQ-2 Total Score: 0    Additional concerns today:  No    Do you feel safe in your environment? Yes    Have you ever done Advance Care Planning? (For example, a Health Directive, POLST, or a discussion with a medical provider or your loved ones about your wishes): No, advance care planning information given to patient to review.  Patient declined advance care planning discussion at this time.    Normal hearing   Fall risk  Fallen 2 or more times in the past year?: No  Any fall with injury in the past year?: No  click delete button to remove this line now  Cognitive Screening Not appropriate due to language barrier    Do you have sleep apnea, excessive snoring or daytime drowsiness?: no    Reviewed and updated as needed this visit by clinical staff  Allergies         Screening and PSA test    Bladder symptoms with frequency    Controlled type 2 diabetes Amaryl caused hypoglycemia and he wanted to discontinue this    Hypertension well-controlled on current blood " pressure regimen    Lisinopril 2.5 mg daily for blood pressure control and for control of microalbuminuria    Insomnia taking an antihistamine which is worsening his prostate symptoms therefore I recommended that he discontinue it    Restarted the finasteride and tamsulosin for BPH symptoms    Recommended aspirin on a daily basis for stroke and heart attack protection    Recommended cholesterol medicine for stroke and heart attack protection    A atorvastatin 10 mg daily for hyperlipidemia without complication    Zantac as needed for gastroesophageal reflux disease    Cholecalciferol for vitamin D deficiency        Reviewed and updated as needed this visit by Provider        Social History     Tobacco Use     Smoking status: Never Smoker     Smokeless tobacco: Never Used   Substance Use Topics     Alcohol use: No     If you drink alcohol do you typically have >3 drinks per day or >7 drinks per week? No    Alcohol Use 1/8/2013   Prescreen: >3 drinks/day or >7 drinks/week? The patient does not drink >3 drinks per day nor >7 drinks per week.   No flowsheet data found.        Current providers sharing in care for this patient include:   Patient Care Team:  Darrin Hernandez MD as PCP - General (Family Practice)  Darrin Hernandez MD as Assigned PCP  Laura Romo RN as Lead Care Coordinator    The following health maintenance items are reviewed in Epic and correct as of today:  Health Maintenance   Topic Date Due     URINE DRUG SCREEN  1945     DTAP/TDAP/TD IMMUNIZATION (1 - Tdap) 12/01/1954     ZOSTER IMMUNIZATION (1 of 2) 12/01/1993     MEDICARE ANNUAL WELLNESS VISIT  01/08/2014     PNEUMOCOCCAL IMMUNIZATION 65+ LOW/MEDIUM RISK (2 of 2 - PCV13) 03/05/2014     INFLUENZA VACCINE (1) 09/01/2019     FALL RISK ASSESSMENT  09/17/2019     DIABETIC FOOT EXAM  11/13/2019     PHQ-2  01/01/2020     A1C  02/13/2020     EYE EXAM  06/28/2020     BMP  08/13/2020     LIPID  08/13/2020     MICROALBUMIN  08/13/2020      TSH W/FREE T4 REFLEX  11/13/2020     ADVANCE CARE PLANNING  03/21/2022     IPV IMMUNIZATION  Aged Out     MENINGITIS IMMUNIZATION  Aged Out       Lab work is in process  Labs reviewed in EPIC  BP Readings from Last 3 Encounters:   01/07/20 120/80   08/13/19 120/68   05/10/19 124/76    Wt Readings from Last 3 Encounters:   01/07/20 82.6 kg (182 lb)   08/13/19 81.6 kg (180 lb)   05/10/19 82.6 kg (182 lb)                  Patient Active Problem List   Diagnosis     Health Care Home     Myalgia and myositis     Allergic rhinitis     Sciatica     Glaucoma     Foot pain, left     Controlled type 2 diabetes mellitus without complication (H)     Hyperlipidemia with target LDL less than 100     Vitamin D insufficiency     DDD (degenerative disc disease), lumbar     Stage 1 chronic renal impairment associated with type 2 diabetes mellitus (H)     Comprehensive diabetic foot examination, type 2 DM, encounter for (H)     Low HDL (under 40)     Hypertension goal BP (blood pressure) < 130/80     CKD (chronic kidney disease) stage 2, GFR 60-89 ml/min     Right hip pain     Type 2 diabetes, HbA1C goal < 8% (H)     ACP (advance care planning)     Enthesopathy of right hip region     History reviewed. No pertinent surgical history.    Social History     Tobacco Use     Smoking status: Never Smoker     Smokeless tobacco: Never Used   Substance Use Topics     Alcohol use: No     Family History   Problem Relation Age of Onset     Family History Negative Mother      Family History Negative Father          Current Outpatient Medications   Medication Sig Dispense Refill     acetaminophen (TYLENOL) 500 MG tablet Take 1-2 tablets (500-1,000 mg) by mouth every 6 hours as needed 120 tablet 11     aspirin (ASA) 81 MG chewable tablet Take 1 tablet (81 mg) by mouth daily 108 tablet 3     atorvastatin (LIPITOR) 10 MG tablet Take 1 tablet (10 mg) by mouth daily 30 tablet 11     blood glucose (MIKHAIL CONTOUR NEXT) test strip 1 strip by In Vitro  route daily 100 each 11     blood glucose monitoring (MIKHAIL CONTOUR MONITOR) meter device kit Use to test blood sugars TWICE DAILY  times daily or as directed. 1 kit 0     blood glucose monitoring (MIKHAIL MICROLET) lancets 1 each 2 times daily 100 each 11     Blood Pressure Monitoring (WRIST BLOOD PRESSURE MONITOR) MISC 1 each 2 times daily as needed 1 each 0     cholecalciferol (VITAMIN D-1000 MAX ST) 1000 units TABS Take 3 tablets by mouth daily 90 tablet 11     finasteride (PROSCAR) 5 MG tablet Take 1 tablet (5 mg) by mouth daily 90 tablet 3     gabapentin (NEURONTIN) 100 MG capsule TAKE 1 CAPSULE (100 MG) BY MOUTH 3 TIMES DAILY 90 capsule 1     lisinopril (PRINIVIL/ZESTRIL) 2.5 MG tablet Take 1 tablet (2.5 mg) by mouth daily 30 tablet 11     melatonin 3 MG tablet Take 1 tablet (3 mg) by mouth nightly as needed for sleep 90 tablet 3     metFORMIN (GLUCOPHAGE-XR) 500 MG 24 hr tablet TAKE 1 TABLET BY MOUTH TWICE DAILY WITH MEALS 60 tablet 11     mineral oil-white petrolatum (MINERIN/EUCERIN) CREA cream Apply topically 2 times daily 30 g 11     ranitidine (ZANTAC) 150 MG capsule Take 1 capsule (150 mg) by mouth 2 times daily 60 capsule 11     tamsulosin (FLOMAX) 0.4 MG capsule Take 1 capsule (0.4 mg) by mouth daily 90 capsule 3     No Known Allergies  Recent Labs   Lab Test 01/07/20  1151 08/13/19  0830 05/03/19  1514 11/13/18  0927 07/30/18  0820  09/29/17  0832  12/08/16  0920   A1C 6.6* 6.3* 7.1* 6.4* 6.6*  --  7.1*   < >  --    LDL  --  77  --   --  80  --  39  --   --    HDL  --  35*  --   --  40  --  40  --   --    TRIG  --  159*  --   --  189*  --  306*  --   --    ALT  --  18  --   --  22  --  24   < >  --    CR  --  0.90  --   --  0.96   < > 0.90   < >  --    GFRESTIMATED  --  83  --   --  77   < > 83   < >  --    GFRESTBLACK  --  >90  --   --  >90   < > >90   < >  --    POTASSIUM  --  4.0  --   --  4.1   < > 4.2   < >  --    TSH  --   --   --  1.21  --   --   --   --  0.99    < > = values in this interval  "not displayed.      No as long as long as they did call them and discontinued the [facility] they could you do that and then I will send it to the other and he just cannot arbitrarily change from pharmacy to pharmacy    Review of Systems   has Health Care Home; Myalgia and myositis; Allergic rhinitis; Sciatica; Glaucoma; Foot pain, left; Controlled type 2 diabetes mellitus without complication (H); Hyperlipidemia with target LDL less than 100; Vitamin D insufficiency; DDD (degenerative disc disease), lumbar; Stage 1 chronic renal impairment associated with type 2 diabetes mellitus (H); Comprehensive diabetic foot examination, type 2 DM, encounter for (H); Low HDL (under 40); Hypertension goal BP (blood pressure) < 130/80; CKD (chronic kidney disease) stage 2, GFR 60-89 ml/min; Right hip pain; Type 2 diabetes, HbA1C goal < 8% (H); ACP (advance care planning); and Enthesopathy of right hip region on their problem list.  Review Of Systems  Skin: negative  Eyes: negative  Ears/Nose/Throat:   Seasonal allergic rhinitis  Respiratory: No shortness of breath, dyspnea on exertion, cough, or hemoptysis  Cardiovascular: negative  Gastrointestinal: heartburn  Genitourinary: negative  Musculoskeletal: Right hip pain  Neurologic: negative  Psychiatric: negative  Hematologic/Lymphatic/Immunologic: negative  Endocrine: diabetes  Hypertension  Hyperlipidemia  Very mild chronic kidney disease          OBJECTIVE:   There were no vitals taken for this visit. Estimated body mass index is 23.43 kg/m  as calculated from the following:    Height as of 6/8/17: 1.867 m (6' 1.5\").    Weight as of 8/13/19: 81.6 kg (180 lb).  Physical Exam  GENERAL: healthy, alert and no distress  EYES: Eyes grossly normal to inspection, PERRL and conjunctivae and sclerae normal  HENT: ear canals and TM's normal, nose and mouth without ulcers or lesions  NECK: no adenopathy, no asymmetry, masses, or scars and thyroid normal to palpation  RESP: lungs clear to " auscultation - no rales, rhonchi or wheezes  CV: regular rate and rhythm, normal S1 S2, no S3 or S4, no murmur, click or rub, no peripheral edema and peripheral pulses strong  ABDOMEN: soft, nontender, no hepatosplenomegaly, no masses and bowel sounds normal   (male): normal male genitalia without lesions or urethral discharge, no hernia  MS: no gross musculoskeletal defects noted, no edema  SKIN: no suspicious lesions or rashes  NEURO: Normal strength and tone, mentation intact and speech normal  PSYCH: mentation appears normal, affect normal/bright  LYMPH: no cervical, supraclavicular, axillary, or inguinal adenopathy  Diabetic foot exam: normal DP and PT pulses, no trophic changes or ulcerative lesions, normal sensory exam and normal monofilament exam    Diagnostic Test Results:  Labs reviewed in Epic  Results for orders placed or performed in visit on 01/07/20   Hemoglobin A1c     Status: Abnormal   Result Value Ref Range    Hemoglobin A1C 6.6 (H) 0 - 5.6 %   UA reflex to Microscopic and Culture     Status: None   Result Value Ref Range    Color Urine Yellow     Appearance Urine Clear     Glucose Urine Negative NEG^Negative mg/dL    Bilirubin Urine Negative NEG^Negative    Ketones Urine Negative NEG^Negative mg/dL    Specific Gravity Urine 1.020 1.003 - 1.035    Blood Urine Negative NEG^Negative    pH Urine 5.5 5.0 - 7.0 pH    Protein Albumin Urine Negative NEG^Negative mg/dL    Urobilinogen Urine 0.2 0.2 - 1.0 EU/dL    Nitrite Urine Negative NEG^Negative    Leukocyte Esterase Urine Negative NEG^Negative    Source Midstream Urine        ASSESSMENT / PLAN:       ICD-10-CM    1. Well adult exam Z00.00 UA reflex to Microscopic and Culture   2. Screening PSA (prostate specific antigen) Z12.5 Prostate spec antigen screen   3. Controlled type 2 diabetes mellitus without complication, without long-term current use of insulin (H) E11.9 DISCONTINUED: glimepiride (AMARYL) 1 MG tablet   4. Type 2 diabetes mellitus with  "stage 1 chronic kidney disease, without long-term current use of insulin (H) E11.22 Albumin Random Urine Quantitative with Creat Ratio    N18.1 Hemoglobin A1c     lisinopril (PRINIVIL/ZESTRIL) 2.5 MG tablet   5. Hypertension goal BP (blood pressure) < 130/80 I10 Basic metabolic panel   6. Hyperlipidemia LDL goal <100 E78.5 Lipid panel reflex to direct LDL Fasting     ALT   7. Primary insomnia F51.01 melatonin 3 MG tablet   8. Benign non-nodular prostatic hyperplasia without lower urinary tract symptoms N40.0 finasteride (PROSCAR) 5 MG tablet     tamsulosin (FLOMAX) 0.4 MG capsule   9. Essential hypertension with goal blood pressure less than 130/80 I10 aspirin (ASA) 81 MG chewable tablet   10. Hyperlipidemia with target LDL less than 100 E78.5 aspirin (ASA) 81 MG chewable tablet       COUNSELING:       Regular exercise       Healthy diet/nutrition       Vision screening       Hearing screening       Dental care    Estimated body mass index is 23.43 kg/m  as calculated from the following:    Height as of 6/8/17: 1.867 m (6' 1.5\").    Weight as of 8/13/19: 81.6 kg (180 lb).         reports that he has never smoked. He has never used smokeless tobacco.      Appropriate preventive services were discussed with this patient, including applicable screening as appropriate for cardiovascular disease, diabetes, osteopenia/osteoporosis, and glaucoma.  As appropriate for age/gender, discussed screening for colorectal cancer, prostate cancer, breast cancer, and cervical cancer. Checklist reviewing preventive services available has been given to the patient.    Reviewed patients plan of care and provided an AVS. The Basic Care Plan (routine screening as documented in Health Maintenance) for Chivo meets the Care Plan requirement. This Care Plan has been established and reviewed with the Patient.    Counseling Resources:  ATP IV Guidelines  Pooled Cohorts Equation Calculator  Breast Cancer Risk Calculator  FRAX Risk " Assessment  ICSI Preventive Guidelines  Dietary Guidelines for Americans, 2010  USDA's MyPlate  ASA Prophylaxis  Lung CA Screening    EDUARDA LAMBERT MD  [facility]    Identified Health Risks:    The patient was counseled and encouraged to consider modifying their diet and eating habits. He was provided with information on recommended healthy diet options.  The patient reports that he has difficulty with activities of daily living. I have asked that the patient make a follow up appointment in 53   weeks where this issue will be further evaluated and addressed.

## 2020-01-07 NOTE — LETTER
"January 8, 2020      Chivo Urias  [address]  [address]  [facility] [address]        Dear ,    We are writing to inform you of your test results.    NORMAL URINE ANALYSIS   NORMAL LIVER FUNCTION TEST   NORMAL TOTAL CHOLESTEROL   HIGH TRIGLYCERIDES   BORDERLINE  LOW HDL OR \"GOOD\" CHOLESTEROL   NORMAL LDL OR \"BAD\" CHOLESTEROL   NORMAL VERY LOW DENSITY CHOLESTEROL   HIGH BLOOD GLUCOSE  MG%   NORMAL BLOOD SALTS AND RENAL FUNCTION   THREE MONTH GLUCOSE AVERAGE AT TARGET LEVEL   6.6MG%   NORMAL URINE ANALYSIS     Resulted Orders   Basic metabolic panel   Result Value Ref Range    Sodium 138 133 - 144 mmol/L    Potassium 3.8 3.4 - 5.3 mmol/L    Chloride 107 94 - 109 mmol/L    Carbon Dioxide 24 20 - 32 mmol/L    Anion Gap 7 3 - 14 mmol/L    Glucose 198 (H) 70 - 99 mg/dL      Comment:      Non Fasting    Urea Nitrogen 13 7 - 30 mg/dL    Creatinine 0.98 0.66 - 1.25 mg/dL    GFR Estimate 74 >60 mL/min/[1.73_m2]      Comment:      Non  GFR Calc  Starting 12/18/2018, serum creatinine based estimated GFR (eGFR) will be   calculated using the Chronic Kidney Disease Epidemiology Collaboration   (CKD-EPI) equation.      GFR Estimate If Black 86 >60 mL/min/[1.73_m2]      Comment:       GFR Calc  Starting 12/18/2018, serum creatinine based estimated GFR (eGFR) will be   calculated using the Chronic Kidney Disease Epidemiology Collaboration   (CKD-EPI) equation.      Calcium 8.7 8.5 - 10.1 mg/dL   Lipid panel reflex to direct LDL Fasting   Result Value Ref Range    Cholesterol 140 <200 mg/dL    Triglycerides 234 (H) <150 mg/dL      Comment:      Borderline high:  150-199 mg/dl  High:             200-499 mg/dl  Very high:       >499 mg/dl  Non Fasting      HDL Cholesterol 38 (L) >39 mg/dL    LDL Cholesterol Calculated 55 <100 mg/dL      Comment:      Desirable:       <100 mg/dl    Non HDL Cholesterol 102 <130 mg/dL   Hemoglobin A1c   Result Value Ref Range    " Hemoglobin A1C 6.6 (H) 0 - 5.6 %      Comment:      Normal <5.7% Prediabetes 5.7-6.4%  Diabetes 6.5% or higher - adopted from ADA   consensus guidelines.     ALT   Result Value Ref Range    ALT 24 0 - 70 U/L   UA reflex to Microscopic and Culture   Result Value Ref Range    Color Urine Yellow     Appearance Urine Clear     Glucose Urine Negative NEG^Negative mg/dL    Bilirubin Urine Negative NEG^Negative    Ketones Urine Negative NEG^Negative mg/dL    Specific Gravity Urine 1.020 1.003 - 1.035    Blood Urine Negative NEG^Negative    pH Urine 5.5 5.0 - 7.0 pH    Protein Albumin Urine Negative NEG^Negative mg/dL    Urobilinogen Urine 0.2 0.2 - 1.0 EU/dL    Nitrite Urine Negative NEG^Negative    Leukocyte Esterase Urine Negative NEG^Negative    Source Midstream Urine        If you have any questions or concerns, please call the clinic at the number listed above.       Sincerely,        EDUARDA LAMBERT MD

## 2020-01-07 NOTE — LETTER
"January 13, 2020      Chivo Urias  [address]  [address]  [facility] [address]        Dear ,    We are writing to inform you of your test results.    NO URINE ANALYSIS   NORMAL LIVER FUNCTION TEST   NORMAL PSA OR PROSTATE CANCER SCREENING TEST,     NORMAL DIABETES URINE PROTEIN TEST   NORMAL URINE ANALYSIS   NORMAL LIVER FUNCTION TEST   NORMAL TOTAL CHOLESTEROL   HIGH TRIGLYCERIDES   BORDERLINE  LOW HDL OR \"GOOD\" CHOLESTEROL   NORMAL LDL OR \"BAD\" CHOLESTEROL   NORMAL VERY LOW DENSITY CHOLESTEROL   HIGH BLOOD GLUCOSE  MG%   NORMAL BLOOD SALTS AND RENAL FUNCTION   THREE MONTH GLUCOSE AVERAGE AT TARGET LEVEL   6.6MG%   NORMAL URINE ANALYSIS     Resulted Orders   Basic metabolic panel   Result Value Ref Range    Sodium 138 133 - 144 mmol/L    Potassium 3.8 3.4 - 5.3 mmol/L    Chloride 107 94 - 109 mmol/L    Carbon Dioxide 24 20 - 32 mmol/L    Anion Gap 7 3 - 14 mmol/L    Glucose 198 (H) 70 - 99 mg/dL      Comment:      Non Fasting    Urea Nitrogen 13 7 - 30 mg/dL    Creatinine 0.98 0.66 - 1.25 mg/dL    GFR Estimate 74 >60 mL/min/[1.73_m2]      Comment:      Non  GFR Calc  Starting 12/18/2018, serum creatinine based estimated GFR (eGFR) will be   calculated using the Chronic Kidney Disease Epidemiology Collaboration   (CKD-EPI) equation.      GFR Estimate If Black 86 >60 mL/min/[1.73_m2]      Comment:       GFR Calc  Starting 12/18/2018, serum creatinine based estimated GFR (eGFR) will be   calculated using the Chronic Kidney Disease Epidemiology Collaboration   (CKD-EPI) equation.      Calcium 8.7 8.5 - 10.1 mg/dL   Lipid panel reflex to direct LDL Fasting   Result Value Ref Range    Cholesterol 140 <200 mg/dL    Triglycerides 234 (H) <150 mg/dL      Comment:      Borderline high:  150-199 mg/dl  High:             200-499 mg/dl  Very high:       >499 mg/dl  Non Fasting      HDL Cholesterol 38 (L) >39 mg/dL    LDL Cholesterol Calculated 55 <100 mg/dL "      Comment:      Desirable:       <100 mg/dl    Non HDL Cholesterol 102 <130 mg/dL   Albumin Random Urine Quantitative with Creat Ratio   Result Value Ref Range    Creatinine Urine 119 mg/dL    Albumin Urine mg/L <5 mg/L    Albumin Urine mg/g Cr Unable to calculate due to low value 0 - 17 mg/g Cr   Hemoglobin A1c   Result Value Ref Range    Hemoglobin A1C 6.6 (H) 0 - 5.6 %      Comment:      Normal <5.7% Prediabetes 5.7-6.4%  Diabetes 6.5% or higher - adopted from ADA   consensus guidelines.     ALT   Result Value Ref Range    ALT 24 0 - 70 U/L   UA reflex to Microscopic and Culture   Result Value Ref Range    Color Urine Yellow     Appearance Urine Clear     Glucose Urine Negative NEG^Negative mg/dL    Bilirubin Urine Negative NEG^Negative    Ketones Urine Negative NEG^Negative mg/dL    Specific Gravity Urine 1.020 1.003 - 1.035    Blood Urine Negative NEG^Negative    pH Urine 5.5 5.0 - 7.0 pH    Protein Albumin Urine Negative NEG^Negative mg/dL    Urobilinogen Urine 0.2 0.2 - 1.0 EU/dL    Nitrite Urine Negative NEG^Negative    Leukocyte Esterase Urine Negative NEG^Negative    Source Midstream Urine    Prostate spec antigen screen   Result Value Ref Range    PSA 1.53 0 - 4 ug/L      Comment:      Assay Method:  Chemiluminescence using Siemens Vista analyzer       If you have any questions or concerns, please call the clinic at the number listed above.       Sincerely,        EDUARDA LAMBERT MD

## 2020-01-08 LAB
ALT SERPL W P-5'-P-CCNC: 24 U/L (ref 0–70)
ANION GAP SERPL CALCULATED.3IONS-SCNC: 7 MMOL/L (ref 3–14)
BUN SERPL-MCNC: 13 MG/DL (ref 7–30)
CALCIUM SERPL-MCNC: 8.7 MG/DL (ref 8.5–10.1)
CHLORIDE SERPL-SCNC: 107 MMOL/L (ref 94–109)
CHOLEST SERPL-MCNC: 140 MG/DL
CO2 SERPL-SCNC: 24 MMOL/L (ref 20–32)
CREAT SERPL-MCNC: 0.98 MG/DL (ref 0.66–1.25)
CREAT UR-MCNC: 119 MG/DL
GFR SERPL CREATININE-BSD FRML MDRD: 74 ML/MIN/{1.73_M2}
GLUCOSE SERPL-MCNC: 198 MG/DL (ref 70–99)
HDLC SERPL-MCNC: 38 MG/DL
LDLC SERPL CALC-MCNC: 55 MG/DL
MICROALBUMIN UR-MCNC: <5 MG/L
MICROALBUMIN/CREAT UR: NORMAL MG/G CR (ref 0–17)
NONHDLC SERPL-MCNC: 102 MG/DL
POTASSIUM SERPL-SCNC: 3.8 MMOL/L (ref 3.4–5.3)
PSA SERPL-ACNC: 1.53 UG/L (ref 0–4)
SODIUM SERPL-SCNC: 138 MMOL/L (ref 133–144)
TRIGL SERPL-MCNC: 234 MG/DL

## 2020-02-27 ENCOUNTER — TRANSFERRED RECORDS (OUTPATIENT)
Dept: HEALTH INFORMATION MANAGEMENT | Facility: CLINIC | Age: 75
End: 2020-02-27

## 2020-05-08 DIAGNOSIS — E08.42 DIABETIC POLYNEUROPATHY ASSOCIATED WITH DIABETES MELLITUS DUE TO UNDERLYING CONDITION (H): ICD-10-CM

## 2020-05-08 RX ORDER — BLOOD SUGAR DIAGNOSTIC
STRIP MISCELLANEOUS
Qty: 100 STRIP | Refills: 11 | Status: SHIPPED | OUTPATIENT
Start: 2020-05-08

## 2020-05-16 DIAGNOSIS — Z79.4 TYPE 2 DIABETES MELLITUS WITH CHRONIC KIDNEY DISEASE, WITH LONG-TERM CURRENT USE OF INSULIN, UNSPECIFIED CKD STAGE (H): ICD-10-CM

## 2020-05-16 DIAGNOSIS — E11.9 CONTROLLED TYPE 2 DIABETES MELLITUS WITHOUT COMPLICATION, WITHOUT LONG-TERM CURRENT USE OF INSULIN (H): Chronic | ICD-10-CM

## 2020-05-16 DIAGNOSIS — N18.1 TYPE 2 DIABETES MELLITUS WITH STAGE 1 CHRONIC KIDNEY DISEASE, WITHOUT LONG-TERM CURRENT USE OF INSULIN (H): Chronic | ICD-10-CM

## 2020-05-16 DIAGNOSIS — E11.22 TYPE 2 DIABETES MELLITUS WITH CHRONIC KIDNEY DISEASE, WITH LONG-TERM CURRENT USE OF INSULIN, UNSPECIFIED CKD STAGE (H): ICD-10-CM

## 2020-05-16 DIAGNOSIS — J30.1 CHRONIC SEASONAL ALLERGIC RHINITIS DUE TO POLLEN: ICD-10-CM

## 2020-05-16 DIAGNOSIS — H10.10 SEASONAL ALLERGIC CONJUNCTIVITIS: ICD-10-CM

## 2020-05-16 DIAGNOSIS — E11.22 TYPE 2 DIABETES MELLITUS WITH STAGE 1 CHRONIC KIDNEY DISEASE, WITHOUT LONG-TERM CURRENT USE OF INSULIN (H): Chronic | ICD-10-CM

## 2020-05-18 RX ORDER — MONTELUKAST SODIUM 10 MG/1
10 TABLET ORAL DAILY
Qty: 30 TABLET | Refills: 11 | Status: SHIPPED | OUTPATIENT
Start: 2020-05-18

## 2020-05-18 RX ORDER — LISINOPRIL 2.5 MG/1
2.5 TABLET ORAL DAILY
Qty: 90 TABLET | Refills: 2 | Status: SHIPPED | OUTPATIENT
Start: 2020-05-18

## 2020-05-18 RX ORDER — GLIPIZIDE 10 MG/1
10 TABLET, FILM COATED, EXTENDED RELEASE ORAL DAILY
Qty: 90 TABLET | Refills: 3 | Status: SHIPPED | OUTPATIENT
Start: 2020-05-18

## 2020-06-02 ENCOUNTER — VIRTUAL VISIT (OUTPATIENT)
Dept: FAMILY MEDICINE | Facility: CLINIC | Age: 75
End: 2020-06-02
Payer: COMMERCIAL

## 2020-06-02 ENCOUNTER — TELEPHONE (OUTPATIENT)
Dept: FAMILY MEDICINE | Facility: CLINIC | Age: 75
End: 2020-06-02

## 2020-06-02 DIAGNOSIS — N40.1 BENIGN PROSTATIC HYPERPLASIA WITH NOCTURIA: ICD-10-CM

## 2020-06-02 DIAGNOSIS — M51.369 DDD (DEGENERATIVE DISC DISEASE), LUMBAR: ICD-10-CM

## 2020-06-02 DIAGNOSIS — R35.1 BENIGN PROSTATIC HYPERPLASIA WITH NOCTURIA: ICD-10-CM

## 2020-06-02 DIAGNOSIS — N40.0 BENIGN NON-NODULAR PROSTATIC HYPERPLASIA WITHOUT LOWER URINARY TRACT SYMPTOMS: ICD-10-CM

## 2020-06-02 DIAGNOSIS — M48.07 SPINAL STENOSIS OF LUMBOSACRAL REGION: Primary | ICD-10-CM

## 2020-06-02 PROCEDURE — 99214 OFFICE O/P EST MOD 30 MIN: CPT | Mod: 95 | Performed by: FAMILY MEDICINE

## 2020-06-02 RX ORDER — MSM/ALOE VERA/EMU OIL/HERBAL66
GEL (GRAM) TOPICAL 3 TIMES DAILY PRN
Qty: 120 G | Refills: 3 | Status: SHIPPED | OUTPATIENT
Start: 2020-06-02

## 2020-06-02 RX ORDER — GABAPENTIN 300 MG/1
300 CAPSULE ORAL AT BEDTIME
Qty: 30 CAPSULE | Refills: 3 | Status: SHIPPED | OUTPATIENT
Start: 2020-06-02

## 2020-06-02 RX ORDER — TADALAFIL 5 MG/1
5 TABLET ORAL EVERY 24 HOURS
Qty: 30 TABLET | Refills: 11 | Status: SHIPPED | OUTPATIENT
Start: 2020-06-02

## 2020-06-02 NOTE — PATIENT INSTRUCTIONS
(M48.07) Spinal stenosis of lumbosacral region  (primary encounter diagnosis)  Comment:    Plan: Orthopedic [facility] Spine  Referral, [facility],         [facility], AND CHIROPRACTIC REFERRAL, [facility]         (DEEP BLUE RELIEF) GEL              (M51.36) DDD (degenerative disc disease), lumbar  Comment:    Plan: gabapentin (NEURONTIN) 300 MG capsule,         [facility] (DEEP BLUE RELIEF) GEL

## 2020-06-02 NOTE — TELEPHONE ENCOUNTER
Central Prior Authorization Team   Phone: [phone]      Prior Authorization Approval    Authorization Effective Date: 5/3/2020  Authorization Expiration Date: 6/2/2021  Medication: tadalafil (CIALIS) 5 MG tablet - APPROVED  Approved Dose/Quantity: 30 FOR 30  Reference #:     Insurance Company: [employer] - Phone [phone] Fax [phone]  Expected CoPay:       CoPay Card Available:      Foundation Assistance Needed:    Which Pharmacy is filling the prescription (Not needed for infusion/clinic administered): [facility] - [place], [place] - [address] [place] [address]  Pharmacy Notified: Yes  Patient Notified: Yes (**Instructed pharmacy to notify patient when script is ready to /ship.**)

## 2020-06-02 NOTE — TELEPHONE ENCOUNTER
Prior Authorization Retail Medication Request    Medication/Dose: tadalafil (CIALIS) 5 MG tablet   ICD code (if different than what is on RX):  Benign prostatic hyperplasia with nocturia (N40.1 , R35.1  Previously Tried and Failed:    Rationale:      Insurance Name:  [employer]   Insurance ID:  [id number]      Pharmacy Information (if different than what is on RX)  Name: [facility] - [address]   Phone:  [phone]

## 2020-06-02 NOTE — PROGRESS NOTES
"Chivo Urias is a 74 year old male who is being evaluated via a billable telephone visit.      The patient has been notified of following:     \"This telephone visit will be conducted via a call between you and your physician/provider. We have found that certain health care needs can be provided without the need for a physical exam.  This service lets us provide the care you need with a short phone conversation.  If a prescription is necessary we can send it directly to your pharmacy.  If lab work is needed we can place an order for that and you can then stop by our lab to have the test done at a later time.    Telephone visits are billed at different rates depending on your insurance coverage. During this emergency period, for some insurers they may be billed the same as an in-person visit.  Please reach out to your insurance provider with any questions.    If during the course of the call the physician/provider feels a telephone visit is not appropriate, you will not be charged for this service.\"    Patient has given verbal consent for Telephone visit?  Yes    What phone number would you like to be contacted at? [phone]                                                              interpreture     [phone]  How would you like to obtain your AVS? Mail a copy    Subjective     Chivo Urias is a 74 year old male who presents via phone visit today for the following health issues:    HPI  Back Pain       Duration: 2 months, worse in morning        Specific cause: none    Description:   Location of pain: middle of back both  Character of pain: sharp  Pain radiation:none  New numbness or weakness in legs, not attributed to pain:  YES    Intensity: Currently /10, moderate    History:   Pain interferes with job: No  History of back problems: 20 years  Any previous MRI or X-rays: None  Sees a specialist for back pain:  No  Therapies tried without relief: none    Alleviating factors:   Improved by: " walking, bike      Precipitating factors:  Worsened by: Lying Flat  NERVE PAIN ITCHING NOT APPLICABLE PAINFUL   RASH ON LEGS NOT SIGNIFICANT INCREASE ITCHING   PHYSICAL THERAPY IN PAST OCCASIONAL IN PAST AND HOME BIKE  SPINAL STENOSIS             Accompanying Signs & Symptoms:  Risk of Fracture:  None  Risk of Cauda Equina:  None  Risk of Infection:  None  Risk of Cancer:  None  Risk of Ankylosing Spondylitis:  Onset at age <35, male, AND morning back stiffness. no                itching      Duration: 3 months    Description (location/character/radiation): both legs    Intensity:  moderate    Accompanying signs and symptoms: burning, itching    History (similar episodes/previous evaluation): None    Precipitating or alleviating factors: None    Therapies tried and outcome: creams     .  Current Outpatient Medications   Medication Sig Dispense Refill     acetaminophen (TYLENOL) 500 MG tablet Take 1-2 tablets (500-1,000 mg) by mouth every 6 hours as needed 120 tablet 11     aspirin (ASA) 81 MG chewable tablet Take 1 tablet (81 mg) by mouth daily 108 tablet 3     atorvastatin (LIPITOR) 10 MG tablet Take 1 tablet (10 mg) by mouth daily 30 tablet 11     blood glucose monitoring (MIKHAIL CONTOUR MONITOR) meter device kit Use to test blood sugars TWICE DAILY  times daily or as directed. 1 kit 0     blood glucose monitoring (MIKHAIL MICROLET) lancets 1 each 2 times daily 100 each 11     Blood Pressure Monitoring (WRIST BLOOD PRESSURE MONITOR) MISC 1 each 2 times daily as needed 1 each 0     cholecalciferol (VITAMIN D-1000 MAX ST) 1000 units TABS Take 3 tablets by mouth daily 90 tablet 11     gabapentin (NEURONTIN) 300 MG capsule Take 1 capsule (300 mg) by mouth At Bedtime 30 capsule 3     glipiZIDE (GLUCOTROL XL) 10 MG 24 hr tablet TAKE 1 TABLET (10 MG) BY MOUTH DAILY 90 tablet 3     Liniments (DEEP BLUE RELIEF) GEL Externally apply topically 3 times daily as needed 120 g 3     lisinopril (ZESTRIL) 2.5 MG tablet TAKE 1 TABLET  (2.5 MG) BY MOUTH DAILY 90 tablet 2     melatonin 3 MG tablet Take 1 tablet (3 mg) by mouth nightly as needed for sleep 90 tablet 3     metFORMIN (GLUCOPHAGE) 500 MG tablet TAKE 1 TABLET BY MOUTH TWICE DAILY WITH MEALS 180 tablet 0     mineral oil-white petrolatum (MINERIN/EUCERIN) CREA cream Apply topically 2 times daily 30 g 11     montelukast (SINGULAIR) 10 MG tablet TAKE 1 TABLET (10 MG) BY MOUTH DAILY 30 tablet 11     ONETOUCH ULTRA test strip USE 1 STRIP BY IN VITRO ROUTE DAILY 100 strip 11     ranitidine (ZANTAC) 150 MG capsule Take 1 capsule (150 mg) by mouth 2 times daily 60 capsule 11     tadalafil (CIALIS) 5 MG tablet Take 1 tablet (5 mg) by mouth every 24 hours 30 tablet 11              No Known Allergies      Immunization History   Administered Date(s) Administered     HepA-Adult 03/16/2017     Meningococcal (Menactra ) 03/16/2017     Pneumococcal 23 valent 03/05/2013     Tetanus 01/01/2010               reports no history of alcohol use.          reports no history of drug use.        family history includes Family History Negative in his father and mother.        He indicated that his mother is alive. He indicated that his father is alive.             has no past surgical history on file.         reports previously being sexually active.    .  Pediatric History   Patient Parents     Not on file     Other Topics Concern     Parent/sibling w/ CABG, MI or angioplasty before 65F 55M? No   Social History Narrative    Surgical History  Return To Top     Status Surgery Time Frame Comment Record Date     Inactive  NONE      11/14/2007          --------------------------------------------------------------------------------    Food Allergy  Return To Top     Allergen Reaction Comment Record Date     * No known food allergies      11/14/2007          --------------------------------------------------------------------------------    Drug Allergy  Return To Top     Allergen Reaction Comment Record Date     * No  known drug allergies      5/15/2007          --------------------------------------------------------------------------------    Environment Allergy  Return To Top     Allergen Reaction Comment Record Date     * No known environmental allergies      11/14/2007          --------------------------------------------------------------------------------    Social History  Return To Top     Question Answer Comment Record Date     Marital status      11/14/2007      Advance Directive or Living Will  No    5/18/2010      Emotional Abuse  No    7/1/2011      Exercise  Yes SOMETIMES  walks alot.  11/14/2007      Caffeine  Yes  Tea  1/11/2008      Physical Abuse  No    7/1/2011      Sealtbelts  Yes    11/14/2007      Sexual Abuse  No     7/1/2011      Breast/Testicle Self Check  Yes    11/14/2007      Number of children  7  4 GIRLS AND 3 BOYS  7/17/2007      Living arrangements  Apartment/Condo    11/14/2007      Number of children in household  0    11/14/2007      Number of adults in household  1    11/14/2007      Education level  High School Graduate    11/14/2007      Employment  Currently unemployed    11/14/2007      Tobacco history  Has never smoked or chewed tobacco    8/29/2008      Alcohol history  Never drinks alcohol    11/14/2007      Has the patient ever used illegal drugs?  Has never used illegal drugs    7/1/2011      Has the patient used marijuana?  No    7/1/2011      Has the patient used cocaine?  No    7/1/2011          --------------------------------------------------------------------------------    Medical History Return To Top     Status Diagnosis Time Frame Comment Record Date     Active (729.1) - C - Myalgia      8/11/2011      Active (726.79) - C - Sub-Achilles bursitis      8/11/2011      Active (700) - C - Corn/callus    o  7/1/2011      Active (272.4) - C - Hyperlipidemia      7/1/2011      Active (V81.2) - C - SCREEN-CARDIOVASC NEC      6/21/2011      Active (V82.9) - C - Screening,  vitamin d deficiency      4/8/2011      Active (V77.91) - C - Screening, lipids      4/8/2011      Active (M06.51) - C - Screening, colon      4/8/2011      Active (780.79) - C - Fatigue      5/18/2010      Active V76.44 Screening, prostate      10/7/2009      Active (780.79) - C - Fatigue      10/7/2009      Active 477.9 Rhinitis, allergic, cause unspecified      8/29/2008      Active (782.0) - C - Numbness    RIGHT LEG  6/19/2008      Active 780.79 Fatigue      2/20/2008      Active (784.0) - C - Headache, unspec.      2/1/2008      Active 724.3 Sciatica    chronic, with worsening weakness and sensory changes in S1 distribution  1/11/2008      Active 608.4 MALE GEN INFLAM DIS OT      11/20/2007      Active 608.9 MALE GENITAL DIS UNSPEC      11/14/2007      Active V78.3 Screening, HGB      11/14/2007      Active 355.9 MONONEURITIS UNSPEC      7/17/2007      Active 365.9 UNSPECIFIED GLAUCOMA      5/16/2007      Active (719.46) - C - Knee pain      5/16/2007      Active (729.5) - C - limb pain    both legs muscle aches  5/16/2007      Active (724.2) - C - Low back pain      5/16/2007      Inactive  (780.79) - C - Fatigue    IMPROVED   10/22/2009      Inactive  (780.79) - C - Fatigue    IMPROVED  6/19/2008      Inactive  782.0 Numbness      5/16/2007          ----------------------------------------------------        --------------------------------------------------------------------------------    Family History  Return To Top     Status Relationship Disease Comment Record Date     Alive Mother      11/14/2007      Alive Father      11/14/2007          --------------------------------------------------------------------------------                       reports that he has never smoked. He has never used smokeless tobacco.        Medical, social, surgical, and family histories reviewed.        Labs reviewed in EPIC  Patient Active Problem List   Diagnosis     Health Care Home     Myalgia and myositis     Allergic  rhinitis     Sciatica     Glaucoma     Foot pain, left     Controlled type 2 diabetes mellitus without complication (H)     Hyperlipidemia with target LDL less than 100     Vitamin D insufficiency     DDD (degenerative disc disease), lumbar     Stage 1 chronic renal impairment associated with type 2 diabetes mellitus (H)     Comprehensive diabetic foot examination, type 2 DM, encounter for (H)     Low HDL (under 40)     Hypertension goal BP (blood pressure) < 130/80     CKD (chronic kidney disease) stage 2, GFR 60-89 ml/min     Right hip pain     Type 2 diabetes, HbA1C goal < 8% (H)     ACP (advance care planning)     Enthesopathy of right hip region         No past surgical history on file.    Social History     Tobacco Use     Smoking status: Never Smoker     Smokeless tobacco: Never Used   Substance Use Topics     Alcohol use: No       Family History   Problem Relation Age of Onset     Family History Negative Mother      Family History Negative Father              Current Outpatient Medications   Medication Sig Dispense Refill     acetaminophen (TYLENOL) 500 MG tablet Take 1-2 tablets (500-1,000 mg) by mouth every 6 hours as needed 120 tablet 11     aspirin (ASA) 81 MG chewable tablet Take 1 tablet (81 mg) by mouth daily 108 tablet 3     atorvastatin (LIPITOR) 10 MG tablet Take 1 tablet (10 mg) by mouth daily 30 tablet 11     blood glucose monitoring (MIKHAIL CONTOUR MONITOR) meter device kit Use to test blood sugars TWICE DAILY  times daily or as directed. 1 kit 0     blood glucose monitoring (MIKHAIL MICROLET) lancets 1 each 2 times daily 100 each 11     Blood Pressure Monitoring (WRIST BLOOD PRESSURE MONITOR) MISC 1 each 2 times daily as needed 1 each 0     cholecalciferol (VITAMIN D-1000 MAX ST) 1000 units TABS Take 3 tablets by mouth daily 90 tablet 11     gabapentin (NEURONTIN) 300 MG capsule Take 1 capsule (300 mg) by mouth At Bedtime 30 capsule 3     glipiZIDE (GLUCOTROL XL) 10 MG 24 hr tablet TAKE 1 TABLET  (10 MG) BY MOUTH DAILY 90 tablet 3     Liniments (DEEP BLUE RELIEF) GEL Externally apply topically 3 times daily as needed 120 g 3     lisinopril (ZESTRIL) 2.5 MG tablet TAKE 1 TABLET (2.5 MG) BY MOUTH DAILY 90 tablet 2     melatonin 3 MG tablet Take 1 tablet (3 mg) by mouth nightly as needed for sleep 90 tablet 3     metFORMIN (GLUCOPHAGE) 500 MG tablet TAKE 1 TABLET BY MOUTH TWICE DAILY WITH MEALS 180 tablet 0     mineral oil-white petrolatum (MINERIN/EUCERIN) CREA cream Apply topically 2 times daily 30 g 11     montelukast (SINGULAIR) 10 MG tablet TAKE 1 TABLET (10 MG) BY MOUTH DAILY 30 tablet 11     ONETOUCH ULTRA test strip USE 1 STRIP BY IN VITRO ROUTE DAILY 100 strip 11     ranitidine (ZANTAC) 150 MG capsule Take 1 capsule (150 mg) by mouth 2 times daily 60 capsule 11     tadalafil (CIALIS) 5 MG tablet Take 1 tablet (5 mg) by mouth every 24 hours 30 tablet 11           Recent Labs   Lab Test 01/07/20  1151 08/13/19  0830 05/03/19  1514 11/13/18  0927 07/30/18  0820  12/08/16  0920   A1C 6.6* 6.3* 7.1* 6.4* 6.6*   < >  --    LDL 55 77  --   --  80   < >  --    HDL 38* 35*  --   --  40   < >  --    TRIG 234* 159*  --   --  189*   < >  --    ALT 24 18  --   --  22   < >  --    CR 0.98 0.90  --   --  0.96   < >  --    GFRESTIMATED 74 83  --   --  77   < >  --    GFRESTBLACK 86 >90  --   --  >90   < >  --    POTASSIUM 3.8 4.0  --   --  4.1   < >  --    TSH  --   --   --  1.21  --   --  0.99    < > = values in this interval not displayed.            BP Readings from Last 6 Encounters:   01/07/20 120/80   08/13/19 120/68   05/10/19 124/76   05/03/19 132/74   11/13/18 114/60   09/17/18 106/64           Wt Readings from Last 3 Encounters:   01/07/20 82.6 kg (182 lb)   08/13/19 81.6 kg (180 lb)   05/10/19 82.6 kg (182 lb)                 Positive symptoms or findings indicated by bold designation:         ROS: 10 point ROS neg other than the symptoms noted above in the HPI.except  has Health Care Home; Myalgia and  "myositis; Allergic rhinitis; Sciatica; Glaucoma; Foot pain, left; Controlled type 2 diabetes mellitus without complication (H); Hyperlipidemia with target LDL less than 100; Vitamin D insufficiency; DDD (degenerative disc disease), lumbar; Stage 1 chronic renal impairment associated with type 2 diabetes mellitus (H); Comprehensive diabetic foot examination, type 2 DM, encounter for (H); Low HDL (under 40); Hypertension goal BP (blood pressure) < 130/80; CKD (chronic kidney disease) stage 2, GFR 60-89 ml/min; Right hip pain; Type 2 diabetes, HbA1C goal < 8% (H); ACP (advance care planning); and Enthesopathy of right hip region on their problem list.  Review Of Systems    VITAMIN D REPLACEMENT      Skin: negative    Eyes: negative    Ears/Nose/Throat: SEASONAL ALLERGIC RHINITIS     Respiratory: No shortness of breath, dyspnea on exertion, cough, or hemoptysis    Cardiovascular: negative    Gastrointestinal: negative    Genitourinary:  CHRONIC KIDNEY DISEASE STAGE 2    SEXUAL DYSFUNCTION     BENIGN PROSTATIC HYPERTROPHY SYMPTOMS     WANTS TO STOP TAMSULOSIN AND FINASTERIDE    HIS SYMPTOMS MAY VERY WELL RETURN          Musculoskeletal: MYALGIAS    CHRONIC LOWER BACK PAIN     FOOT PAIN     RIGHT HIP PAIN    LOWER BACK PAIN     Neurologic: negative    Psychiatric: negative    Hematologic/Lymphatic/Immunologic: negative    Endocrine: DIABETES 2 GOAL 8%     LDL OR \"BAD\" CHOLESTEROL  GOAL < 100     HYPERTENSION WITH GOAL OF LESS THAN 140/80                 PE:  There were no vitals taken for this visit. There is no height or weight on file to calculate BMI.            Psychiatric: orientation/consciousness, overall: oriented to person, place and time;  speech, overall: normal quality, no aphasia and normal quality, quantity, intact.          Diagnostic Test Results:    none         ICD-10-CM    1. Spinal stenosis of lumbosacral region  M48.07 Orthopedic & Spine  Referral     [facility] REFERR[facility] "     Liniments (DEEP BLUE RELIEF) GEL   2. DDD (degenerative disc disease), lumbar  M51.36 gabapentin (NEURONTIN) 300 MG capsule     Liniments (DEEP BLUE RELIEF) GEL   3. Benign prostatic hyperplasia with nocturia  N40.1 tadalafil (CIALIS) 5 MG tablet    R35.1    4. Benign non-nodular prostatic hyperplasia without lower urinary tract symptoms  N40.0               .    Side effects benefits and risks thoroughly discussed. .he may come in early if unimproved or getting worse          Please drink 2 glasses of water prior to meals and walk 15-30 minutes after meals        I spent 25 MINUTES SPENT  with patient discussing the following issues   The primary encounter diagnosis was Spinal stenosis of lumbosacral region. Diagnoses of DDD (degenerative disc disease), lumbar, Benign prostatic hyperplasia with nocturia, and Benign non-nodular prostatic hyperplasia without lower urinary tract symptoms were also pertinent to this visit. over half of which involved counseling and coordination of care.      Patient Instructions   (M48.07) Spinal stenosis of lumbosacral region  (primary encounter diagnosis)  Comment:    Plan: Orthopedic & Spine  Referral, [facility],         HAND, AND CHIROPRACTIC REFERRAL, Liniments         (DEEP BLUE RELIEF) GEL              (M51.36) DDD (degenerative disc disease), lumbar  Comment:    Plan: gabapentin (NEURONTIN) 300 MG capsule,         Liniments (DEEP BLUE RELIEF) GEL                             ALL THE ABOVE PROBLEMS ARE STABLE AND MED CHANGES AS NOTED        Diet: MEDITERRANEAN DIET         Exercise:  AS TOLERATED   Exercises Range of motion, balance, isometric, and strengthening exercises 30 repetitions twice daily of involved joints            .EDUARDA LAMBERT MD 6/2/2020 3:29 PM  June 2, 2020

## 2020-06-11 ENCOUNTER — APPOINTMENT (OUTPATIENT)
Dept: INTERPRETER SERVICES | Facility: CLINIC | Age: 75
End: 2020-06-11
Payer: COMMERCIAL

## 2020-06-25 ENCOUNTER — PATIENT OUTREACH (OUTPATIENT)
Dept: GERIATRIC MEDICINE | Facility: CLINIC | Age: 75
End: 2020-06-25

## 2020-06-25 NOTE — PROGRESS NOTES
[facility] Care Coordination Contact    Called client to inform him about the [facility] closure. Client reports he is aware and had established care with Dr. Chris Zimmer at [facility]. Will transfer client to his new care system effective 7/1/20 and client verbalized understanding of the plan.      Laura RomoRN BSN PHN  [facility] Care Coordinator  [phone]  Fax:[phone]

## 2020-06-29 DIAGNOSIS — E78.00 PURE HYPERCHOLESTEROLEMIA: ICD-10-CM

## 2020-06-29 RX ORDER — ATORVASTATIN CALCIUM 10 MG/1
10 TABLET, FILM COATED ORAL DAILY
Qty: 90 TABLET | Refills: 2 | Status: SHIPPED | OUTPATIENT
Start: 2020-06-29 | End: 2021-07-29

## 2020-07-16 ENCOUNTER — PATIENT OUTREACH (OUTPATIENT)
Dept: GERIATRIC MEDICINE | Facility: CLINIC | Age: 75
End: 2020-07-16

## 2020-07-16 NOTE — PROGRESS NOTES
[facility] Care Coordination Contact    No longer active with [facility] case management effective 7/1/20.  Reason for community disenrollment: Change Care System/PCC to Goran Samson @ [facility]   Laura Romo RN BSN PHN  [facility] Care Coordinator  [phone]  Fax:[phone]

## 2021-06-01 VITALS — BODY MASS INDEX: 23.74 KG/M2 | HEIGHT: 74 IN | WEIGHT: 185 LBS

## 2021-06-19 NOTE — ANESTHESIA POSTPROCEDURE EVALUATION
Patient: Chivo Urias  RIGHT TOTAL KNEE ARTHROPLASTY  Anesthesia type: spinal    Patient location: PACU  Last vitals:   Vitals:    07/03/18 1050   BP: 119/77   Pulse: 60   Resp: 15   Temp:    SpO2: 98%     Post vital signs: stable  Level of consciousness: awake and responds to simple questions  Post-anesthesia pain: pain controlled  Post-anesthesia nausea and vomiting: no  Pulmonary: unassisted, return to baseline  Cardiovascular: stable and blood pressure at baseline  Hydration: adequate  Anesthetic events: no    QCDR Measures:  ASA# 11 - Sivan-op Cardiac Arrest: ASA11B - Patient did NOT experience unanticipated cardiac arrest  ASA# 12 - Sivan-op Mortality Rate: ASA12B - Patient did NOT die  ASA# 13 - PACU Re-Intubation Rate: NA - No ETT / LMA used for case  ASA# 10 - Composite Anes Safety: ASA10A - No serious adverse event    Additional Notes:

## 2021-06-19 NOTE — ANESTHESIA PROCEDURE NOTES
Peripheral Block    Patient location during procedure: pre-op  Start time: 7/3/2018 7:01 AM  End time: 7/3/2018 7:03 AM  post-op analgesia per surgeon order as noted in medical record  Staffing:  Performing  Anesthesiologist: SRINI CABRERA  Preanesthetic Checklist  Completed: patient identified, site marked, risks, benefits, and alternatives discussed, timeout performed, consent obtained, at patient's request, airway assessed, oxygen available, suction available, emergency drugs available and hand hygiene performed  Peripheral Block  Block type: saphenous, adductor canal block  Prep: ChloraPrep  Patient position: supine  Patient monitoring: cardiac monitor, continuous pulse oximetry, heart rate and blood pressure  Laterality: right  Injection technique: ultrasound guided    Ultrasound used to visualize needle placement in proximity to nerve being blocked: yes   Permanent ultrasound image captured for medical record  Sterile gel and probe cover used for ultrasound.    Needle  Needle type: Stimuplex   Needle gauge: 20G  Needle length: 4 in  no peripheral nerve catheter placed  Assessment  Injection assessment: no difficulty with injection, negative aspiration for heme, no paresthesia on injection and incremental injection

## 2021-06-19 NOTE — ANESTHESIA PROCEDURE NOTES
Peripheral Block    Patient location during procedure: pre-op  Start time: 7/3/2018 7:03 AM  End time: 7/3/2018 7:05 AM  post-op analgesia per surgeon order as noted in medical record  Staffing:  Performing  Anesthesiologist: SRINI CABRERA  Preanesthetic Checklist  Completed: patient identified, site marked, risks, benefits, and alternatives discussed, timeout performed, consent obtained, at patient's request, airway assessed, oxygen available, suction available, emergency drugs available and hand hygiene performed  Peripheral Block  Block type: sciatic, tibial  Prep: ChloraPrep  Patient position: supine  Patient monitoring: cardiac monitor, continuous pulse oximetry, blood pressure and heart rate  Laterality: right  Injection technique: ultrasound guided    Ultrasound used to visualize needle placement in proximity to nerve being blocked: yes   Permanent ultrasound image captured for medical record  Sterile gel and probe cover used for ultrasound.    Needle  Needle type: Stimuplex   Needle gauge: 20G  Needle length: 4 in  no peripheral nerve catheter placed  Assessment  Injection assessment: no difficulty with injection, no paresthesia on injection, negative aspiration for heme and incremental injection

## 2021-06-19 NOTE — ANESTHESIA PROCEDURE NOTES
Spinal Block    Patient location during procedure: OR  Start time: 7/3/2018 7:26 AM  End time: 7/3/2018 7:30 AM  Reason for block: primary anesthetic    Staffing:  Performing  Anesthesiologist: SRINI CABRERA    Preanesthetic Checklist  Completed: patient identified, risks, benefits, and alternatives discussed, timeout performed, consent obtained, at patient's request, airway assessed, oxygen available, suction available, emergency drugs available and hand hygiene performed  Spinal Block  Patient position: sitting  Prep: ChloraPrep and site prepped and draped  Patient monitoring: heart rate, cardiac monitor, continuous pulse ox and blood pressure  Approach: midline  Location: L3-4  Injection technique: single-shot  Needle type: pencil-tip   Needle gauge: 25 G      Additional Notes:  Second pass clear CSF. Aspirated freely, injected easily

## 2021-06-19 NOTE — ANESTHESIA PREPROCEDURE EVALUATION
Anesthesia Evaluation      Patient summary reviewed   No history of anesthetic complications     Airway   Mallampati: I  Neck ROM: full   Pulmonary - negative ROS    breath sounds clear to auscultation                         Cardiovascular   (+) hypertension, , hypercholesterolemia,     ECG reviewed  Rhythm: regular  Rate: normal,         Neuro/Psych      Comments: Cervical DDD    Endo/Other    (+) diabetes mellitus,      GI/Hepatic/Renal    (+)   chronic renal disease CRI,           Dental - normal exam                        Anesthesia Plan  Planned anesthetic: spinal  ACB/Tibial nerve blocks per Dr Hollis's request  ASA 3     Anesthetic plan and risks discussed with: patient  Anesthesia plan special considerations: antiemetics,   Post-op plan: routine recovery

## 2021-06-19 NOTE — ANESTHESIA CARE TRANSFER NOTE
Last vitals:   Vitals:    07/03/18 0930   BP: 101/57   Pulse: 66   Resp: 14   Temp: 35.9  C (96.6  F)   SpO2: 100%     Patient's level of consciousness is drowsy  Spontaneous respirations: yes  Maintains airway independently: yes  Dentition unchanged: yes  Oropharynx: oropharynx clear of all foreign objects      QCDR Measures:  ASA# 20 - Surgical Safety Checklist: WHO surgical safety checklist completed prior to induction  PQRS# 430 - Adult PONV Prevention: 4558F - Pt received => 2 anti-emetic agents (different classes) preop & intraop  ASA# 8 - Peds PONV Prevention: NA - Not pediatric patient, not GA or 2 or more risk factors NOT present  PQRS# 424 - Sivan-op Temp Management: 4559F - At least one body temp DOCUMENTED => 35.5C or 95.9F within required timeframe  PQRS# 426 - PACU Transfer Protocol: - Transfer of care checklist used  ASA# 14 - Acute Post-op Pain: ASA14A - Patient experienced pain >= 7 out of 10

## 2021-07-28 DIAGNOSIS — E78.00 PURE HYPERCHOLESTEROLEMIA: ICD-10-CM

## 2021-07-29 RX ORDER — ATORVASTATIN CALCIUM 10 MG/1
10 TABLET, FILM COATED ORAL DAILY
Qty: 90 TABLET | Refills: 0 | Status: SHIPPED | OUTPATIENT
Start: 2021-07-29

## 2021-07-29 NOTE — TELEPHONE ENCOUNTER
Routing refill request to provider for review/approval because:  Drug not on the [facility] refill protocol     Used to see Mary, needs to be scheduled with new provider     Percy Stevenson RN  [facility] Triage Nurse

## 2021-08-05 NOTE — TELEPHONE ENCOUNTER
Spoke with patient using  services. Informed patient justin period refill was given but needs to est. care with new PCP. Offered to make appointment for him now but he wants to check his schedule before doing so. Gave scheduling number to call to set that up.  Asya Hightower, CMA

## 2021-10-21 DIAGNOSIS — E78.00 PURE HYPERCHOLESTEROLEMIA: ICD-10-CM

## 2021-10-21 RX ORDER — ATORVASTATIN CALCIUM 10 MG/1
TABLET, FILM COATED ORAL
Qty: 90 TABLET | Refills: 0 | OUTPATIENT
Start: 2021-10-21

## 2021-10-21 NOTE — TELEPHONE ENCOUNTER
Routing refill request to provider for review/approval because:  Labs not current:  LDL  LDL Cholesterol Calculated   Date Value Ref Range Status   01/07/2020 55 <100 mg/dL Final     Comment:     Desirable:       <100 mg/dl     Patient needs to be seen because it has been more than 1 year since last office visit.    Routing to TC, please contact patient to schedule appointment  Maribel Valle RN

## 2021-10-21 NOTE — LETTER
[facility]  [address] [address]  [phone]  November 1, 2021  Chivo Urias  [address]  [address]  [facility] [id number]    Dear Chivo,    I am contacting you regarding the refill request we received for you. After reviewing your chart it looks like you are overdue for your annual and for a med check. Please call [phone] or schedule this through my chart to continue to receive refills. If you anticipate running out before your appointment let us know and we can send in a justin refill.       Thank you,     [facility] nursing staff

## 2021-10-22 NOTE — TELEPHONE ENCOUNTER
He is overdue for his annual wellness exam. Please ask him to schedule this appointment ASAP. Can refill medication temporarily if he anticipates running out prior to scheduled appointment.     Thank you.

## 2022-04-25 NOTE — PROGRESS NOTES
Please send normal lab letter when labs are complete  NORMAL FECAL COLORECTAL CANCER SCREEN   EDUARDA LAMBERT JR., MD
no